# Patient Record
Sex: MALE | Race: WHITE | NOT HISPANIC OR LATINO | Employment: OTHER | URBAN - METROPOLITAN AREA
[De-identification: names, ages, dates, MRNs, and addresses within clinical notes are randomized per-mention and may not be internally consistent; named-entity substitution may affect disease eponyms.]

---

## 2020-06-19 ENCOUNTER — APPOINTMENT (OUTPATIENT)
Dept: PREADMISSION TESTING | Facility: HOSPITAL | Age: 65
End: 2020-06-19

## 2020-06-19 LAB
REF LAB TEST METHOD: NORMAL
SARS-COV-2 RNA RESP QL NAA+PROBE: NOT DETECTED

## 2020-06-19 PROCEDURE — U0002 COVID-19 LAB TEST NON-CDC: HCPCS

## 2020-06-19 PROCEDURE — C9803 HOPD COVID-19 SPEC COLLECT: HCPCS

## 2020-06-19 PROCEDURE — U0004 COV-19 TEST NON-CDC HGH THRU: HCPCS

## 2020-09-18 ENCOUNTER — APPOINTMENT (OUTPATIENT)
Dept: PREADMISSION TESTING | Facility: HOSPITAL | Age: 65
End: 2020-09-18

## 2020-09-20 ENCOUNTER — APPOINTMENT (OUTPATIENT)
Dept: PREADMISSION TESTING | Facility: HOSPITAL | Age: 65
End: 2020-09-20

## 2020-09-20 LAB — SARS-COV-2 RNA NOSE QL NAA+PROBE: NOT DETECTED

## 2020-09-20 PROCEDURE — U0004 COV-19 TEST NON-CDC HGH THRU: HCPCS

## 2020-09-20 PROCEDURE — C9803 HOPD COVID-19 SPEC COLLECT: HCPCS

## 2020-09-29 ENCOUNTER — HOSPITAL ENCOUNTER (INPATIENT)
Facility: HOSPITAL | Age: 65
LOS: 3 days | Discharge: HOME | End: 2020-10-02
Attending: EMERGENCY MEDICINE | Admitting: INTERNAL MEDICINE

## 2020-09-29 ENCOUNTER — APPOINTMENT (OUTPATIENT)
Dept: CT IMAGING | Facility: HOSPITAL | Age: 65
End: 2020-09-29

## 2020-09-29 ENCOUNTER — APPOINTMENT (OUTPATIENT)
Dept: MRI IMAGING | Facility: HOSPITAL | Age: 65
End: 2020-09-29

## 2020-09-29 ENCOUNTER — APPOINTMENT (OUTPATIENT)
Dept: GENERAL RADIOLOGY | Facility: HOSPITAL | Age: 65
End: 2020-09-29

## 2020-09-29 DIAGNOSIS — Z74.09 IMPAIRED FUNCTIONAL MOBILITY, BALANCE, GAIT, AND ENDURANCE: ICD-10-CM

## 2020-09-29 DIAGNOSIS — I63.9 ACUTE CVA (CEREBROVASCULAR ACCIDENT) (HCC): Primary | ICD-10-CM

## 2020-09-29 PROBLEM — E78.5 HYPERLIPIDEMIA LDL GOAL <100: Status: ACTIVE | Noted: 2020-09-29

## 2020-09-29 PROBLEM — I10 ESSENTIAL HYPERTENSION: Status: ACTIVE | Noted: 2020-09-29

## 2020-09-29 PROBLEM — Z85.21 HISTORY OF LARYNGEAL CANCER: Status: ACTIVE | Noted: 2020-09-29

## 2020-09-29 LAB
ALT SERPL W P-5'-P-CCNC: 21 U/L (ref 1–41)
APTT PPP: 26.8 SECONDS (ref 24–37)
AST SERPL-CCNC: 33 U/L (ref 1–40)
BASE EXCESS BLDA CALC-SCNC: -1 MMOL/L (ref -5–5)
BASOPHILS # BLD AUTO: 0.09 10*3/MM3 (ref 0–0.2)
BASOPHILS NFR BLD AUTO: 1.1 % (ref 0–1.5)
CA-I BLDA-SCNC: 1.22 MMOL/L (ref 1.2–1.32)
CO2 BLDA-SCNC: 26 MMOL/L (ref 24–29)
CREAT BLDA-MCNC: 1.3 MG/DL (ref 0.6–1.3)
DEPRECATED RDW RBC AUTO: 44.9 FL (ref 37–54)
EOSINOPHIL # BLD AUTO: 0.25 10*3/MM3 (ref 0–0.4)
EOSINOPHIL NFR BLD AUTO: 3 % (ref 0.3–6.2)
ERYTHROCYTE [DISTWIDTH] IN BLOOD BY AUTOMATED COUNT: 13.6 % (ref 12.3–15.4)
GLUCOSE BLDC GLUCOMTR-MCNC: 102 MG/DL (ref 70–130)
GLUCOSE BLDC GLUCOMTR-MCNC: 122 MG/DL (ref 70–130)
GLUCOSE BLDC GLUCOMTR-MCNC: 124 MG/DL (ref 70–130)
HCO3 BLDA-SCNC: 24.9 MMOL/L (ref 22–26)
HCT VFR BLD AUTO: 42.2 % (ref 37.5–51)
HCT VFR BLDA CALC: 43 % (ref 38–51)
HGB BLD-MCNC: 13.4 G/DL (ref 13–17.7)
HGB BLDA-MCNC: 14.6 G/DL (ref 12–17)
HOLD SPECIMEN: NORMAL
HOLD SPECIMEN: NORMAL
IMM GRANULOCYTES # BLD AUTO: 0.06 10*3/MM3 (ref 0–0.05)
IMM GRANULOCYTES NFR BLD AUTO: 0.7 % (ref 0–0.5)
INR PPP: 1.1 (ref 0.8–1.2)
LYMPHOCYTES # BLD AUTO: 0.84 10*3/MM3 (ref 0.7–3.1)
LYMPHOCYTES NFR BLD AUTO: 10.1 % (ref 19.6–45.3)
MCH RBC QN AUTO: 29 PG (ref 26.6–33)
MCHC RBC AUTO-ENTMCNC: 31.8 G/DL (ref 31.5–35.7)
MCV RBC AUTO: 91.3 FL (ref 79–97)
MONOCYTES # BLD AUTO: 0.55 10*3/MM3 (ref 0.1–0.9)
MONOCYTES NFR BLD AUTO: 6.6 % (ref 5–12)
NEUTROPHILS NFR BLD AUTO: 6.51 10*3/MM3 (ref 1.7–7)
NEUTROPHILS NFR BLD AUTO: 78.5 % (ref 42.7–76)
NRBC BLD AUTO-RTO: 0 /100 WBC (ref 0–0.2)
PCO2 BLDA: 47.8 MM HG (ref 35–45)
PH BLDA: 7.32 PH UNITS (ref 7.35–7.6)
PLATELET # BLD AUTO: 173 10*3/MM3 (ref 140–450)
PMV BLD AUTO: 10.5 FL (ref 6–12)
PO2 BLDA: 21 MMHG (ref 80–105)
POTASSIUM BLDA-SCNC: 4.5 MMOL/L (ref 3.5–4.9)
PROTHROMBIN TIME: 12.8 SECONDS (ref 12.8–15.2)
RBC # BLD AUTO: 4.62 10*6/MM3 (ref 4.14–5.8)
SAO2 % BLDA: 30 % (ref 95–98)
SARS-COV-2 RDRP RESP QL NAA+PROBE: NOT DETECTED
SODIUM BLD-SCNC: 141 MMOL/L (ref 138–146)
TROPONIN T SERPL-MCNC: <0.01 NG/ML (ref 0–0.03)
WBC # BLD AUTO: 8.3 10*3/MM3 (ref 3.4–10.8)
WHOLE BLOOD HOLD SPECIMEN: NORMAL
WHOLE BLOOD HOLD SPECIMEN: NORMAL

## 2020-09-29 PROCEDURE — C1769 GUIDE WIRE: HCPCS | Performed by: NEUROLOGICAL SURGERY

## 2020-09-29 PROCEDURE — 70498 CT ANGIOGRAPHY NECK: CPT

## 2020-09-29 PROCEDURE — 87635 SARS-COV-2 COVID-19 AMP PRB: CPT | Performed by: EMERGENCY MEDICINE

## 2020-09-29 PROCEDURE — 93010 ELECTROCARDIOGRAM REPORT: CPT | Performed by: INTERNAL MEDICINE

## 2020-09-29 PROCEDURE — 25010000002 MIDAZOLAM PER 1 MG: Performed by: NEUROLOGICAL SURGERY

## 2020-09-29 PROCEDURE — 82565 ASSAY OF CREATININE: CPT

## 2020-09-29 PROCEDURE — 25010000002 FENTANYL CITRATE (PF) 100 MCG/2ML SOLUTION: Performed by: NEUROLOGICAL SURGERY

## 2020-09-29 PROCEDURE — 0 IOPAMIDOL PER 1 ML

## 2020-09-29 PROCEDURE — 71045 X-RAY EXAM CHEST 1 VIEW: CPT

## 2020-09-29 PROCEDURE — 36224 PLACE CATH CAROTD ART: CPT | Performed by: NEUROLOGICAL SURGERY

## 2020-09-29 PROCEDURE — 84295 ASSAY OF SERUM SODIUM: CPT

## 2020-09-29 PROCEDURE — 70450 CT HEAD/BRAIN W/O DYE: CPT

## 2020-09-29 PROCEDURE — B41F1ZZ FLUOROSCOPY OF RIGHT LOWER EXTREMITY ARTERIES USING LOW OSMOLAR CONTRAST: ICD-10-PCS | Performed by: INTERNAL MEDICINE

## 2020-09-29 PROCEDURE — B3151ZZ FLUOROSCOPY OF BILATERAL COMMON CAROTID ARTERIES USING LOW OSMOLAR CONTRAST: ICD-10-PCS | Performed by: INTERNAL MEDICINE

## 2020-09-29 PROCEDURE — 99223 1ST HOSP IP/OBS HIGH 75: CPT | Performed by: STUDENT IN AN ORGANIZED HEALTH CARE EDUCATION/TRAINING PROGRAM

## 2020-09-29 PROCEDURE — 93005 ELECTROCARDIOGRAM TRACING: CPT | Performed by: NEUROLOGICAL SURGERY

## 2020-09-29 PROCEDURE — 84132 ASSAY OF SERUM POTASSIUM: CPT

## 2020-09-29 PROCEDURE — 82947 ASSAY GLUCOSE BLOOD QUANT: CPT

## 2020-09-29 PROCEDURE — B31G1ZZ FLUOROSCOPY OF BILATERAL VERTEBRAL ARTERIES USING LOW OSMOLAR CONTRAST: ICD-10-PCS | Performed by: INTERNAL MEDICINE

## 2020-09-29 PROCEDURE — 93005 ELECTROCARDIOGRAM TRACING: CPT | Performed by: EMERGENCY MEDICINE

## 2020-09-29 PROCEDURE — C1760 CLOSURE DEV, VASC: HCPCS | Performed by: NEUROLOGICAL SURGERY

## 2020-09-29 PROCEDURE — 99284 EMERGENCY DEPT VISIT MOD MDM: CPT

## 2020-09-29 PROCEDURE — 36226 PLACE CATH VERTEBRAL ART: CPT | Performed by: NEUROLOGICAL SURGERY

## 2020-09-29 PROCEDURE — C1894 INTRO/SHEATH, NON-LASER: HCPCS | Performed by: NEUROLOGICAL SURGERY

## 2020-09-29 PROCEDURE — 85610 PROTHROMBIN TIME: CPT

## 2020-09-29 PROCEDURE — 84460 ALANINE AMINO (ALT) (SGPT): CPT | Performed by: EMERGENCY MEDICINE

## 2020-09-29 PROCEDURE — 99223 1ST HOSP IP/OBS HIGH 75: CPT | Performed by: INTERNAL MEDICINE

## 2020-09-29 PROCEDURE — 84484 ASSAY OF TROPONIN QUANT: CPT | Performed by: EMERGENCY MEDICINE

## 2020-09-29 PROCEDURE — 85730 THROMBOPLASTIN TIME PARTIAL: CPT | Performed by: EMERGENCY MEDICINE

## 2020-09-29 PROCEDURE — 70496 CT ANGIOGRAPHY HEAD: CPT

## 2020-09-29 PROCEDURE — 0 IODIXANOL PER 1 ML: Performed by: NEUROLOGICAL SURGERY

## 2020-09-29 PROCEDURE — 85014 HEMATOCRIT: CPT

## 2020-09-29 PROCEDURE — 84450 TRANSFERASE (AST) (SGOT): CPT | Performed by: EMERGENCY MEDICINE

## 2020-09-29 PROCEDURE — 0042T HC CT CEREBRAL PERFUSION W/WO CONTRAST: CPT

## 2020-09-29 PROCEDURE — 82330 ASSAY OF CALCIUM: CPT

## 2020-09-29 PROCEDURE — 85025 COMPLETE CBC W/AUTO DIFF WBC: CPT | Performed by: EMERGENCY MEDICINE

## 2020-09-29 PROCEDURE — 70551 MRI BRAIN STEM W/O DYE: CPT

## 2020-09-29 PROCEDURE — 36223 PLACE CATH CAROTID/INOM ART: CPT | Performed by: NEUROLOGICAL SURGERY

## 2020-09-29 PROCEDURE — 82803 BLOOD GASES ANY COMBINATION: CPT

## 2020-09-29 PROCEDURE — B3121ZZ FLUOROSCOPY OF LEFT SUBCLAVIAN ARTERY USING LOW OSMOLAR CONTRAST: ICD-10-PCS | Performed by: INTERNAL MEDICINE

## 2020-09-29 RX ORDER — SODIUM CHLORIDE 0.9 % (FLUSH) 0.9 %
3 SYRINGE (ML) INJECTION EVERY 12 HOURS SCHEDULED
Status: DISCONTINUED | OUTPATIENT
Start: 2020-09-29 | End: 2020-09-30

## 2020-09-29 RX ORDER — IODIXANOL 320 MG/ML
INJECTION, SOLUTION INTRAVASCULAR AS NEEDED
Status: DISCONTINUED | OUTPATIENT
Start: 2020-09-29 | End: 2020-09-29 | Stop reason: HOSPADM

## 2020-09-29 RX ORDER — SODIUM CHLORIDE 0.9 % (FLUSH) 0.9 %
10 SYRINGE (ML) INJECTION AS NEEDED
Status: DISCONTINUED | OUTPATIENT
Start: 2020-09-29 | End: 2020-10-02 | Stop reason: HOSPADM

## 2020-09-29 RX ORDER — OXYCODONE HYDROCHLORIDE AND ACETAMINOPHEN 5; 325 MG/1; MG/1
2 TABLET ORAL EVERY 4 HOURS PRN
Status: DISCONTINUED | OUTPATIENT
Start: 2020-09-29 | End: 2020-10-02 | Stop reason: HOSPADM

## 2020-09-29 RX ORDER — SODIUM CHLORIDE 0.9 % (FLUSH) 0.9 %
10 SYRINGE (ML) INJECTION AS NEEDED
Status: DISCONTINUED | OUTPATIENT
Start: 2020-09-29 | End: 2020-09-30

## 2020-09-29 RX ORDER — ASPIRIN 325 MG
325 TABLET ORAL DAILY
Status: DISCONTINUED | OUTPATIENT
Start: 2020-09-29 | End: 2020-10-01

## 2020-09-29 RX ORDER — SODIUM CHLORIDE 9 MG/ML
75 INJECTION, SOLUTION INTRAVENOUS CONTINUOUS
Status: ACTIVE | OUTPATIENT
Start: 2020-09-29 | End: 2020-09-29

## 2020-09-29 RX ORDER — LIDOCAINE HYDROCHLORIDE 10 MG/ML
INJECTION, SOLUTION EPIDURAL; INFILTRATION; INTRACAUDAL; PERINEURAL AS NEEDED
Status: DISCONTINUED | OUTPATIENT
Start: 2020-09-29 | End: 2020-09-29 | Stop reason: HOSPADM

## 2020-09-29 RX ORDER — DORZOLAMIDE HYDROCHLORIDE AND TIMOLOL MALEATE 20; 5 MG/ML; MG/ML
1 SOLUTION/ DROPS OPHTHALMIC 2 TIMES DAILY
COMMUNITY
End: 2022-05-04

## 2020-09-29 RX ORDER — SODIUM CHLORIDE 0.9 % (FLUSH) 0.9 %
10 SYRINGE (ML) INJECTION EVERY 12 HOURS SCHEDULED
Status: DISCONTINUED | OUTPATIENT
Start: 2020-09-29 | End: 2020-10-02 | Stop reason: HOSPADM

## 2020-09-29 RX ORDER — SODIUM CHLORIDE 9 MG/ML
100 INJECTION, SOLUTION INTRAVENOUS ONCE
Status: DISCONTINUED | OUTPATIENT
Start: 2020-09-29 | End: 2020-09-30

## 2020-09-29 RX ORDER — DORZOLAMIDE HYDROCHLORIDE AND TIMOLOL MALEATE 20; 5 MG/ML; MG/ML
SOLUTION/ DROPS OPHTHALMIC 2 TIMES DAILY
Status: DISCONTINUED | OUTPATIENT
Start: 2020-09-29 | End: 2020-10-02 | Stop reason: HOSPADM

## 2020-09-29 RX ORDER — BRIMONIDINE TARTRATE AND TIMOLOL MALEATE 2; 5 MG/ML; MG/ML
1 SOLUTION OPHTHALMIC EVERY 12 HOURS
COMMUNITY
End: 2022-05-04

## 2020-09-29 RX ORDER — SODIUM CHLORIDE 0.9 % (FLUSH) 0.9 %
10 SYRINGE (ML) INJECTION EVERY 12 HOURS SCHEDULED
Status: DISCONTINUED | OUTPATIENT
Start: 2020-09-29 | End: 2020-09-30

## 2020-09-29 RX ORDER — BRIMONIDINE TARTRATE 2 MG/ML
1 SOLUTION/ DROPS OPHTHALMIC EVERY 12 HOURS SCHEDULED
Status: DISCONTINUED | OUTPATIENT
Start: 2020-09-29 | End: 2020-10-02 | Stop reason: HOSPADM

## 2020-09-29 RX ORDER — ASPIRIN 300 MG/1
300 SUPPOSITORY RECTAL DAILY
Status: DISCONTINUED | OUTPATIENT
Start: 2020-09-29 | End: 2020-10-01

## 2020-09-29 RX ORDER — MIDAZOLAM HYDROCHLORIDE 1 MG/ML
INJECTION INTRAMUSCULAR; INTRAVENOUS AS NEEDED
Status: DISCONTINUED | OUTPATIENT
Start: 2020-09-29 | End: 2020-09-29 | Stop reason: HOSPADM

## 2020-09-29 RX ORDER — LATANOPROST 50 UG/ML
1 SOLUTION/ DROPS OPHTHALMIC NIGHTLY
Status: DISCONTINUED | OUTPATIENT
Start: 2020-09-29 | End: 2020-10-02 | Stop reason: HOSPADM

## 2020-09-29 RX ORDER — FENTANYL CITRATE 50 UG/ML
INJECTION, SOLUTION INTRAMUSCULAR; INTRAVENOUS AS NEEDED
Status: DISCONTINUED | OUTPATIENT
Start: 2020-09-29 | End: 2020-09-29 | Stop reason: HOSPADM

## 2020-09-29 RX ORDER — ATORVASTATIN CALCIUM 40 MG/1
80 TABLET, FILM COATED ORAL NIGHTLY
Status: DISCONTINUED | OUTPATIENT
Start: 2020-09-29 | End: 2020-09-30

## 2020-09-29 RX ADMIN — SODIUM CHLORIDE 75 ML/HR: 9 INJECTION, SOLUTION INTRAVENOUS at 16:46

## 2020-09-29 RX ADMIN — DORZOLAMIDE HYDROCHLORIDE: 20 SOLUTION/ DROPS OPHTHALMIC at 21:55

## 2020-09-29 RX ADMIN — LATANOPROST 1 DROP: 50 SOLUTION OPHTHALMIC at 21:53

## 2020-09-29 RX ADMIN — SODIUM CHLORIDE, PRESERVATIVE FREE 10 ML: 5 INJECTION INTRAVENOUS at 21:15

## 2020-09-29 RX ADMIN — Medication 10 ML: at 21:15

## 2020-09-29 RX ADMIN — Medication 3 ML: at 21:15

## 2020-09-29 RX ADMIN — BRIMONIDINE TARTRATE 1 DROP: 2 SOLUTION OPHTHALMIC at 21:53

## 2020-09-29 RX ADMIN — IOPAMIDOL 115 ML: 755 INJECTION, SOLUTION INTRAVENOUS at 13:18

## 2020-09-30 ENCOUNTER — APPOINTMENT (OUTPATIENT)
Dept: CARDIOLOGY | Facility: HOSPITAL | Age: 65
End: 2020-09-30

## 2020-09-30 LAB
ALBUMIN SERPL-MCNC: 4 G/DL (ref 3.5–5.2)
ALBUMIN/GLOB SERPL: 1.4 G/DL
ALP SERPL-CCNC: 98 U/L (ref 39–117)
ALT SERPL W P-5'-P-CCNC: 19 U/L (ref 1–41)
ANION GAP SERPL CALCULATED.3IONS-SCNC: 12 MMOL/L (ref 5–15)
AST SERPL-CCNC: 28 U/L (ref 1–40)
BASOPHILS # BLD AUTO: 0.07 10*3/MM3 (ref 0–0.2)
BASOPHILS NFR BLD AUTO: 0.5 % (ref 0–1.5)
BH CV ECHO MEAS - AO MAX PG (FULL): 3.3 MMHG
BH CV ECHO MEAS - AO MAX PG: 7.3 MMHG
BH CV ECHO MEAS - AO MEAN PG (FULL): 2.7 MMHG
BH CV ECHO MEAS - AO MEAN PG: 4.9 MMHG
BH CV ECHO MEAS - AO ROOT AREA (BSA CORRECTED): 1.4
BH CV ECHO MEAS - AO ROOT AREA: 6.7 CM^2
BH CV ECHO MEAS - AO ROOT DIAM: 2.9 CM
BH CV ECHO MEAS - AO V2 MAX: 135.1 CM/SEC
BH CV ECHO MEAS - AO V2 MEAN: 105.7 CM/SEC
BH CV ECHO MEAS - AO V2 VTI: 22.7 CM
BH CV ECHO MEAS - BSA(HAYCOCK): 2.1 M^2
BH CV ECHO MEAS - BSA: 2.1 M^2
BH CV ECHO MEAS - BZI_BMI: 29 KILOGRAMS/M^2
BH CV ECHO MEAS - BZI_METRIC_HEIGHT: 177.8 CM
BH CV ECHO MEAS - BZI_METRIC_WEIGHT: 91.6 KG
BH CV ECHO MEAS - EDV(CUBED): 71.5 ML
BH CV ECHO MEAS - EDV(MOD-SP2): 41 ML
BH CV ECHO MEAS - EDV(MOD-SP4): 36 ML
BH CV ECHO MEAS - EDV(TEICH): 76.4 ML
BH CV ECHO MEAS - EF(CUBED): 61.7 %
BH CV ECHO MEAS - EF(MOD-BP): 72 %
BH CV ECHO MEAS - EF(MOD-SP2): 73.2 %
BH CV ECHO MEAS - EF(MOD-SP4): 72.2 %
BH CV ECHO MEAS - EF(TEICH): 53.7 %
BH CV ECHO MEAS - ESV(CUBED): 27.4 ML
BH CV ECHO MEAS - ESV(MOD-SP2): 11 ML
BH CV ECHO MEAS - ESV(MOD-SP4): 10 ML
BH CV ECHO MEAS - ESV(TEICH): 35.4 ML
BH CV ECHO MEAS - FS: 27.4 %
BH CV ECHO MEAS - IVS/LVPW: 1.1
BH CV ECHO MEAS - IVSD: 1.1 CM
BH CV ECHO MEAS - LA DIMENSION: 3.7 CM
BH CV ECHO MEAS - LA/AO: 1.3
BH CV ECHO MEAS - LAD MAJOR: 5 CM
BH CV ECHO MEAS - LAT PEAK E' VEL: 10.7 CM/SEC
BH CV ECHO MEAS - LATERAL E/E' RATIO: 6.3
BH CV ECHO MEAS - LV DIASTOLIC VOL/BSA (35-75): 17.2 ML/M^2
BH CV ECHO MEAS - LV MASS(C)D: 152.1 GRAMS
BH CV ECHO MEAS - LV MASS(C)DI: 72.6 GRAMS/M^2
BH CV ECHO MEAS - LV MAX PG: 4 MMHG
BH CV ECHO MEAS - LV MEAN PG: 2.2 MMHG
BH CV ECHO MEAS - LV SYSTOLIC VOL/BSA (12-30): 4.8 ML/M^2
BH CV ECHO MEAS - LV V1 MAX: 100.2 CM/SEC
BH CV ECHO MEAS - LV V1 MEAN: 69.1 CM/SEC
BH CV ECHO MEAS - LV V1 VTI: 19.7 CM
BH CV ECHO MEAS - LVIDD: 4.2 CM
BH CV ECHO MEAS - LVIDS: 3 CM
BH CV ECHO MEAS - LVLD AP2: 6.8 CM
BH CV ECHO MEAS - LVLD AP4: 7.1 CM
BH CV ECHO MEAS - LVLS AP2: 5.8 CM
BH CV ECHO MEAS - LVLS AP4: 5.5 CM
BH CV ECHO MEAS - LVPWD: 1 CM
BH CV ECHO MEAS - MED PEAK E' VEL: 7.7 CM/SEC
BH CV ECHO MEAS - MEDIAL E/E' RATIO: 8.8
BH CV ECHO MEAS - MV A MAX VEL: 93.3 CM/SEC
BH CV ECHO MEAS - MV DEC SLOPE: 279.2 CM/SEC^2
BH CV ECHO MEAS - MV DEC TIME: 0.23 SEC
BH CV ECHO MEAS - MV E MAX VEL: 69.1 CM/SEC
BH CV ECHO MEAS - MV E/A: 0.74
BH CV ECHO MEAS - MV P1/2T MAX VEL: 83.3 CM/SEC
BH CV ECHO MEAS - MV P1/2T: 87.3 MSEC
BH CV ECHO MEAS - MVA P1/2T LCG: 2.6 CM^2
BH CV ECHO MEAS - MVA(P1/2T): 2.5 CM^2
BH CV ECHO MEAS - SI(AO): 72 ML/M^2
BH CV ECHO MEAS - SI(CUBED): 21 ML/M^2
BH CV ECHO MEAS - SI(MOD-SP2): 14.3 ML/M^2
BH CV ECHO MEAS - SI(MOD-SP4): 12.4 ML/M^2
BH CV ECHO MEAS - SI(TEICH): 19.6 ML/M^2
BH CV ECHO MEAS - SV(AO): 150.9 ML
BH CV ECHO MEAS - SV(CUBED): 44.1 ML
BH CV ECHO MEAS - SV(MOD-SP2): 30 ML
BH CV ECHO MEAS - SV(MOD-SP4): 26 ML
BH CV ECHO MEAS - SV(TEICH): 41 ML
BH CV ECHO MEAS - TAPSE (>1.6): 1.7 CM
BH CV ECHO MEASUREMENTS AVERAGE E/E' RATIO: 7.51
BH CV VAS BP RIGHT ARM: NORMAL MMHG
BH CV XLRA - TDI S': 17.4 CM/SEC
BILIRUB SERPL-MCNC: 0.9 MG/DL (ref 0–1.2)
BUN SERPL-MCNC: 15 MG/DL (ref 8–23)
BUN/CREAT SERPL: 13.3 (ref 7–25)
CALCIUM SPEC-SCNC: 9 MG/DL (ref 8.6–10.5)
CHLORIDE SERPL-SCNC: 102 MMOL/L (ref 98–107)
CHOLEST SERPL-MCNC: 121 MG/DL (ref 0–200)
CO2 SERPL-SCNC: 22 MMOL/L (ref 22–29)
CREAT SERPL-MCNC: 1.13 MG/DL (ref 0.76–1.27)
DEPRECATED RDW RBC AUTO: 47.3 FL (ref 37–54)
EOSINOPHIL # BLD AUTO: 0.07 10*3/MM3 (ref 0–0.4)
EOSINOPHIL NFR BLD AUTO: 0.5 % (ref 0.3–6.2)
ERYTHROCYTE [DISTWIDTH] IN BLOOD BY AUTOMATED COUNT: 13.9 % (ref 12.3–15.4)
GFR SERPL CREATININE-BSD FRML MDRD: 65 ML/MIN/1.73
GFR SERPL CREATININE-BSD FRML MDRD: 79 ML/MIN/1.73
GLOBULIN UR ELPH-MCNC: 2.9 GM/DL
GLUCOSE SERPL-MCNC: 101 MG/DL (ref 65–99)
HBA1C MFR BLD: 5.6 % (ref 4.8–5.6)
HCT VFR BLD AUTO: 43.6 % (ref 37.5–51)
HDLC SERPL-MCNC: 37 MG/DL (ref 40–60)
HGB BLD-MCNC: 13.6 G/DL (ref 13–17.7)
IMM GRANULOCYTES # BLD AUTO: 0.23 10*3/MM3 (ref 0–0.05)
IMM GRANULOCYTES NFR BLD AUTO: 1.7 % (ref 0–0.5)
LDLC SERPL CALC-MCNC: 65 MG/DL (ref 0–100)
LDLC/HDLC SERPL: 1.75 {RATIO}
LEFT ATRIUM VOLUME INDEX: 15.7 ML/M^2
LEFT ATRIUM VOLUME: 33 ML
LV EF 2D ECHO EST: 70 %
LYMPHOCYTES # BLD AUTO: 0.72 10*3/MM3 (ref 0.7–3.1)
LYMPHOCYTES NFR BLD AUTO: 5.4 % (ref 19.6–45.3)
MAGNESIUM SERPL-MCNC: 2.5 MG/DL (ref 1.6–2.4)
MAXIMAL PREDICTED HEART RATE: 154 BPM
MCH RBC QN AUTO: 29 PG (ref 26.6–33)
MCHC RBC AUTO-ENTMCNC: 31.2 G/DL (ref 31.5–35.7)
MCV RBC AUTO: 93 FL (ref 79–97)
MONOCYTES # BLD AUTO: 1.05 10*3/MM3 (ref 0.1–0.9)
MONOCYTES NFR BLD AUTO: 7.9 % (ref 5–12)
NEUTROPHILS NFR BLD AUTO: 11.15 10*3/MM3 (ref 1.7–7)
NEUTROPHILS NFR BLD AUTO: 84 % (ref 42.7–76)
NRBC BLD AUTO-RTO: 0 /100 WBC (ref 0–0.2)
PLATELET # BLD AUTO: 169 10*3/MM3 (ref 140–450)
PMV BLD AUTO: 10.8 FL (ref 6–12)
POTASSIUM SERPL-SCNC: 4.4 MMOL/L (ref 3.5–5.2)
PROT SERPL-MCNC: 6.9 G/DL (ref 6–8.5)
RBC # BLD AUTO: 4.69 10*6/MM3 (ref 4.14–5.8)
SODIUM SERPL-SCNC: 136 MMOL/L (ref 136–145)
STRESS TARGET HR: 131 BPM
TRIGL SERPL-MCNC: 96 MG/DL (ref 0–150)
VLDLC SERPL-MCNC: 19.2 MG/DL
WBC # BLD AUTO: 13.29 10*3/MM3 (ref 3.4–10.8)

## 2020-09-30 PROCEDURE — 83036 HEMOGLOBIN GLYCOSYLATED A1C: CPT | Performed by: NEUROLOGICAL SURGERY

## 2020-09-30 PROCEDURE — 83735 ASSAY OF MAGNESIUM: CPT | Performed by: INTERNAL MEDICINE

## 2020-09-30 PROCEDURE — 92523 SPEECH SOUND LANG COMPREHEN: CPT

## 2020-09-30 PROCEDURE — 80053 COMPREHEN METABOLIC PANEL: CPT | Performed by: INTERNAL MEDICINE

## 2020-09-30 PROCEDURE — 97161 PT EVAL LOW COMPLEX 20 MIN: CPT

## 2020-09-30 PROCEDURE — 99232 SBSQ HOSP IP/OBS MODERATE 35: CPT | Performed by: INTERNAL MEDICINE

## 2020-09-30 PROCEDURE — 97165 OT EVAL LOW COMPLEX 30 MIN: CPT

## 2020-09-30 PROCEDURE — 92610 EVALUATE SWALLOWING FUNCTION: CPT

## 2020-09-30 PROCEDURE — 93306 TTE W/DOPPLER COMPLETE: CPT

## 2020-09-30 PROCEDURE — 85025 COMPLETE CBC W/AUTO DIFF WBC: CPT | Performed by: INTERNAL MEDICINE

## 2020-09-30 PROCEDURE — 80061 LIPID PANEL: CPT | Performed by: NEUROLOGICAL SURGERY

## 2020-09-30 RX ORDER — ATORVASTATIN CALCIUM 40 MG/1
40 TABLET, FILM COATED ORAL NIGHTLY
Status: DISCONTINUED | OUTPATIENT
Start: 2020-09-30 | End: 2020-10-02 | Stop reason: HOSPADM

## 2020-09-30 RX ADMIN — DORZOLAMIDE HYDROCHLORIDE: 20 SOLUTION/ DROPS OPHTHALMIC at 09:18

## 2020-09-30 RX ADMIN — ASPIRIN 300 MG: 300 SUPPOSITORY RECTAL at 01:04

## 2020-09-30 RX ADMIN — BRIMONIDINE TARTRATE 1 DROP: 2 SOLUTION OPHTHALMIC at 21:34

## 2020-09-30 RX ADMIN — ATORVASTATIN CALCIUM 40 MG: 40 TABLET, FILM COATED ORAL at 21:27

## 2020-09-30 RX ADMIN — SODIUM CHLORIDE, PRESERVATIVE FREE 10 ML: 5 INJECTION INTRAVENOUS at 21:34

## 2020-09-30 RX ADMIN — ASPIRIN 325 MG ORAL TABLET 325 MG: 325 PILL ORAL at 10:17

## 2020-09-30 RX ADMIN — SODIUM CHLORIDE, PRESERVATIVE FREE 10 ML: 5 INJECTION INTRAVENOUS at 09:25

## 2020-09-30 RX ADMIN — LATANOPROST 1 DROP: 50 SOLUTION OPHTHALMIC at 21:27

## 2020-09-30 RX ADMIN — BRIMONIDINE TARTRATE 1 DROP: 2 SOLUTION OPHTHALMIC at 09:18

## 2020-09-30 RX ADMIN — DORZOLAMIDE HYDROCHLORIDE: 20 SOLUTION/ DROPS OPHTHALMIC at 21:27

## 2020-10-01 PROCEDURE — 99232 SBSQ HOSP IP/OBS MODERATE 35: CPT | Performed by: INTERNAL MEDICINE

## 2020-10-01 PROCEDURE — 94799 UNLISTED PULMONARY SVC/PX: CPT

## 2020-10-01 PROCEDURE — 94760 N-INVAS EAR/PLS OXIMETRY 1: CPT

## 2020-10-01 PROCEDURE — 99233 SBSQ HOSP IP/OBS HIGH 50: CPT | Performed by: STUDENT IN AN ORGANIZED HEALTH CARE EDUCATION/TRAINING PROGRAM

## 2020-10-01 RX ORDER — ASPIRIN 81 MG/1
81 TABLET ORAL DAILY
Status: DISCONTINUED | OUTPATIENT
Start: 2020-10-01 | End: 2020-10-02 | Stop reason: HOSPADM

## 2020-10-01 RX ADMIN — SODIUM CHLORIDE, PRESERVATIVE FREE 10 ML: 5 INJECTION INTRAVENOUS at 09:49

## 2020-10-01 RX ADMIN — ATORVASTATIN CALCIUM 40 MG: 40 TABLET, FILM COATED ORAL at 20:49

## 2020-10-01 RX ADMIN — LATANOPROST 1 DROP: 50 SOLUTION OPHTHALMIC at 20:49

## 2020-10-01 RX ADMIN — DORZOLAMIDE HYDROCHLORIDE: 20 SOLUTION/ DROPS OPHTHALMIC at 09:48

## 2020-10-01 RX ADMIN — DORZOLAMIDE HYDROCHLORIDE: 20 SOLUTION/ DROPS OPHTHALMIC at 21:29

## 2020-10-01 RX ADMIN — BRIMONIDINE TARTRATE 1 DROP: 2 SOLUTION OPHTHALMIC at 21:36

## 2020-10-01 RX ADMIN — APIXABAN 5 MG: 5 TABLET, FILM COATED ORAL at 20:49

## 2020-10-01 RX ADMIN — BRIMONIDINE TARTRATE 1 DROP: 2 SOLUTION OPHTHALMIC at 09:48

## 2020-10-01 RX ADMIN — ASPIRIN 325 MG ORAL TABLET 325 MG: 325 PILL ORAL at 09:45

## 2020-10-01 RX ADMIN — SODIUM CHLORIDE, PRESERVATIVE FREE 10 ML: 5 INJECTION INTRAVENOUS at 20:49

## 2020-10-01 RX ADMIN — ASPIRIN 81 MG: 81 TABLET, COATED ORAL at 17:38

## 2020-10-01 NOTE — PROGRESS NOTES
Stroke Progress Note       Chief Complaint:  Speech difficulty    Subjective    Subjective     Subjective  Speech improved.     Review of Systems   Constitutional: No fatigue  HENT: Negative for nosebleeds and rhinorrhea.    Eyes: Negative for redness.   Respiratory: Negative for cough.    Gastrointestinal: Negative for anal bleeding.   Endocrine: Negative for polydipsia.   Genitourinary: Negative for enuresis and urgency.   Musculoskeletal: Negative for joint swelling.   Neurological: Negative for tremors.   Psychiatric/Behavioral: Negative for hallucinations.     Objective      Temp:  [98.1 °F (36.7 °C)-100.3 °F (37.9 °C)] 98.1 °F (36.7 °C)  Heart Rate:  [] 98  Resp:  [16-18] 16  BP: (113-175)/() 137/77    GEN: NAD, pleasant, cooperative  Eyes-show anicteric sclera, moist conjunctiva with no lid lag, no redness  Neck-trachea midline.  There is no thyromegaly.  ENMT-oropharynx clear with moist mucous membranes and good dentition.  Skin-no rash, lesions or ulcers.  Cardiovascular exam-no pedal edema, regular rate and rhythm.  CHEST: No signs of resp distress, on room air  Abdomen-no abdominal distention, nontender.  Psychiatric exam-alert oriented x3 with intact judgment and insight      NEURO    MENTAL STATUS: AAOx3, memory intact, fund of knowledge appropriate    LANG/SPEECH: Naming and repetition intact, fluent, follows 3-step commands    CRANIAL NERVES:      II: Pupils equal and reactive, no RAPD, no VF deficits, fundus(not done)      III, IV, VI: EOM intact, no gaze preference or deviation, no nystagmus.      V: normal sensation in V1, V2, and V3 segments bilaterally      VII: no asymmetry, no nasolabial fold flattening      VIII: normal hearing to speech      IX, X: normal palatal elevation, no uvular deviation      XI: 5/5 head turn and 5/5 shoulder shrug bilaterally      XII: midline tongue protrusion    MOTOR:  Normal tone throughout  5/5 muscle power in Rt shoulder abductors/adductors, elbow  flexors/extensors, wrist flexors/extensors, finger abductors/adductors.  5/5 in Rt hip flexors/extensors, knee flexors/extensors, ankle dorsiflexors and plantar flexors.    5/5 muscle power in Lt shoulder abductors/adductors, elbow flexors/extensors, wrist flexors/extensors, finger abductors/adductors.  5/5 in Lt hip flexors/extensors, knee flexors/extensors, ankle dorsiflexors and plantea flexors.    REFLEXES:  no Green's, no clonus    SENSORY:    COORDINATION: Normal finger to nose and heel to shin, no tremor, no dysmetria    STATION: Not assessed due to patient condition    GAIT: Not assessed due to patient condition    Results Review:    I reviewed the patient's new clinical results.    Lab Results (last 24 hours)     ** No results found for the last 24 hours. **        Mri Brain Without Contrast    Result Date: 9/30/2020  1. Restricted diffusion left frontotemporal region as described above including left basal ganglia and insular cortex regions of acute infarction with minimal localized edema, however, no midline shift or mass effect associated.  2. Asymmetric signal flow voids left distal internal carotid artery better seen on recent CTA performed earlier same day.  D:  09/29/2020 E:  09/29/2020     This report was finalized on 9/30/2020 3:53 PM by Dr. Rigo Jung.      Xr Chest 1 View    Result Date: 9/29/2020  Mild nonspecific pulmonary interstitial changes as described. No other evidence of active chest disease is seen.  D:  09/29/2020 E:  09/29/2020     This report was finalized on 9/29/2020 9:10 PM by Dr. Jeramie Kemp MD.      Results for orders placed during the hospital encounter of 09/29/20   Adult Transthoracic Echo Complete W/ Cont if Necessary Per Protocol (With Agitated Saline)    Narrative · Trace mitral valve regurgitation is present.  · Estimated left ventricular EF = 70% Left ventricular systolic function   is normal.                Assessment/Plan     Assessment/Plan:        This is a  65-year-old, , right-handed male with known medical diagnosis of laryngeal cancer status post radiation, hypertension, and hyperlipidemia for an acute onset of confusion and speech problems.  Not a candidate for TPA as he was out of window.  Because of the fluctuating nature of symptoms, patient was taken to the Cath Lab emergently to evaluate the carotid stenosis and MCA occlusion for possible stent and a thrombectomy.  However, the occlusion seems to be chronic, neither stent or thrombectomy was performed.     Diagnostics- I personally reviewed all the imaging and labs  -CT head-early ischemic changes of the left MCA, aspects of 7  -CT head and neck, left MCA superior division occlusion, left ICA occlusion at the origin with reconstitution of the skull base likely collateralization from external carotid artery, left common carotid significant stenosis.  -MRI brain-  left frontotemporal infarct involving basal ganglia and insular cortex.  No microhemorrhages GRE sequences.   -Transthoracic echo-72% EF, normal left atrium, no PFO  -LDL 65 , A1c 5.6     #1  a. Acute ischemic stroke, left middle cerebral artery, likely etiology atheroembolic from large vessel disease.  Patient has significant stenosis of the left common carotid, occlusion of the left ICA with reconstitution at the base of the skull, with superior MCA division occlusion.  Most likely the carotid occlusion seems to be chronic, and this could be stump emboli occluding left superior division MCA.  b. Stroke secondary prevention, eliquis  5mg BID + Aspirin 81, followed by repeat CTA in 3 months, may transition to DAPT later.  c. Stroke recovery- Activity, PT/OT/Speech-increase activity.  Can transition to floor on 9/30.   d. Stroke education-educated on adherence of medications.  And stroke modifiable risk factors.                  #2 Hypertension-normalize blood pressure goals  #3 Hypothyroidism-continue home medication   #4 Hyperlipidemia- goal  less than < 70. Start high intensity statin Lipitor  80              Ganga Palmer MD  10/01/20  13:53 EDT

## 2020-10-01 NOTE — DISCHARGE SUMMARY
[facility] Medicine Services  DISCHARGE SUMMARY    Patient Name: Patrick Shay  : 1955  MRN: [id number]    Date of Admission: 2020  1:05 PM  Date of Discharge:  10/2/2020  Primary Care Physician: Provider, No Known    Consults     Date and Time Order Name Status Description    2020 1713 Inpatient Neurology Consult Stroke Completed     2020 1309 Inpatient Neurology Consult Stroke Completed           Hospital Course     Presenting Problem:   Acute CVA (cerebrovascular accident) (CMS/HCC) [I63.9]    Active Hospital Problems    Diagnosis  POA   • **Acute CVA (cerebrovascular accident) (CMS/HCC) [I63.9]  Yes   • Essential hypertension [I10]  Yes   • Hyperlipidemia LDL goal <100 [E78.5]  Yes   • History of laryngeal cancer [Z85.21]  Not Applicable      Resolved Hospital Problems   No resolved problems to display.          Hospital Course:  Patrick Shay is a 65 y.o. male with history of HTN, HLD, laryngeal cancer s/p largynectomy and previous trach who initially presented to [facility] ICU  with stroke like symptoms - including facial droop and slurred speech. Patient was noted on CT imaging to have concern for early left MCA stroke with edema, he was taken emergently to the cath lab for intervention where he was noted to have stenosis favored to be chronic and secondary to previous radiation, no intervention was done and no occlusion noted. Patient was not given TPA due to being outside of window. He was transitioned out of ICU and to floor  and was doing well, stable for discharge 10/1.  There was some notes about patient previously being on xarelto (reason unknown) but patient and wife denied this and reported no h/o of being on blood thinners. I d/w neurology who ultimately did recommend starting eliquis for secondary prevention and patient was started on eliquis as well as ASA 81mg which he tolerated well. Will continue these medications with high intensity statin  upon discharge and will have close PCP-neurology f/u    Patient's remainder of stroke w/u negative - EF normal on TTE.       Discharge Follow Up Recommendations for outpatient labs/diagnostics:  PCP- Dr Niharika Hsieh in 1-2 weeks  [facility] in 4-6 weeks    Day of Discharge     HPI:   Doing well. Tolerated eliquis without issue. Wants to go home    Review of Systems  Gen- No fevers, chills  CV- No chest pain, palpitations  Resp- No cough, dyspnea  GI- No N/V/D, abd pain        Vital Signs:   Temp:  [96.6 °F (35.9 °C)-99.3 °F (37.4 °C)] 98 °F (36.7 °C)  Heart Rate:  [63-98] 85  Resp:  [14-18] 18  BP: (102-150)/(65-89) 139/82     Physical Exam:  GEN- no acute distress noted, resting in bed, awake  HEENT- atraumatic, trach in place with pessary valve   NECK- supple, trachea midline, no masses  RESP: ctab, normal effort  CV: no murmurs, s1/s2, rrr  MSK: no edema noted, spontaneous movement of all extremities  NEURO: alert, oriented, no focal deficits  SKIN: no rashes  PSYCH: appropriate mood and affect     Pertinent  and/or Most Recent Results     Results from last 7 days   Lab Units 10/02/20  0759 09/30/20  0506 09/29/20  1327 09/29/20  1320 09/29/20  1319   WBC 10*3/mm3 9.66 13.29* 8.30  --   --    HEMOGLOBIN g/dL 14.0 13.6 13.4  --   --    HEMOGLOBIN, POC g/dL  --   --   --   --  14.6   HEMATOCRIT % 44.0 43.6 42.2  --   --    HEMATOCRIT POC %  --   --   --   --  43   PLATELETS 10*3/mm3 161 169 173  --   --    SODIUM mmol/L 135* 136  --   --   --    POTASSIUM mmol/L 3.5 4.4  --   --   --    CHLORIDE mmol/L 99 102  --   --   --    CO2 mmol/L 27.0 22.0  --   --   --    BUN mg/dL 13 15  --   --   --    CREATININE mg/dL 1.07 1.13  --  1.30  --    GLUCOSE mg/dL 116* 101*  --   --   --    CALCIUM mg/dL 9.2 9.0  --   --   --      Results from last 7 days   Lab Units 09/30/20  0506 09/29/20  1327 09/29/20  1322   BILIRUBIN mg/dL 0.9  --   --    ALK PHOS U/L 98  --   --    ALT (SGPT) U/L 19 21  --    AST (SGOT) U/L 28 33  --     PROTIME seconds  --   --  12.8   INR   --   --  1.1   APTT seconds  --  26.8  --      Results from last 7 days   Lab Units 09/30/20  0506   CHOLESTEROL mg/dL 121   TRIGLYCERIDES mg/dL 96   HDL CHOL mg/dL 37*     Results from last 7 days   Lab Units 09/30/20  0506 09/29/20  1327   HEMOGLOBIN A1C % 5.60  --    TROPONIN T ng/mL  --  <0.010       Brief Urine Lab Results     None          Microbiology Results Abnormal     Procedure Component Value - Date/Time    COVID-19, [facility] IN-HOUSE,NP Swab (NO TRANSPORT MEDIA) 2 HR TAT - Swab, Nasopharynx [[id number]]  (Normal) Collected: 09/29/20 1346    Lab Status: Final result Specimen: Swab from Nasopharynx Updated: 09/29/20 1448     COVID19 Not Detected    Narrative:      Fact sheet for providers: https://www.fda.gov/media/085687/download     Fact sheet for patients: https://www.fda.gov/media/984056/download          Imaging Results (All)     Procedure Component Value Units Date/Time    MRI Brain Without Contrast [[id number]] Collected: 09/29/20 1856     Updated: 09/30/20 1557    Narrative:      EXAMINATION: MRI BRAIN WO CONTRAST-09/29/2020:     INDICATION: Stroke, follow-up; I63.9-Cerebral infarction, unspecified.      TECHNIQUE: Multiplanar MRI of the brain without intravenous contrast.     COMPARISON: CT head and CT cerebral perfusion earlier same day.     FINDINGS: Restricted diffusion left anterior basal ganglia, left insular  cortex and left frontal lobe with localized edema of increased T2 and  FLAIR signal without associated mass effect or midline shift. Background  mild chronic small vessel ischemic disease noted in the supratentorial  white matter. Globes and orbits retain normal T2 signal characteristics.  The visualized paranasal sinuses and mastoid air cells are grossly clear  and well pneumatized with the exception of trace right and small-to-  moderate left mastoid effusions. No cerebellopontine angle mass lesion.  Asymmetric signal flow voids in the left distal  internal carotid artery  on limited evaluation. Susceptibility artifact not present on  susceptibility-weighted imaging. Pituitary and sella within normal  limits. Cervicomedullary junction widely patent.       Impression:      1. Restricted diffusion left frontotemporal region as described above  including left basal ganglia and insular cortex regions of acute  infarction with minimal localized edema, however, no midline shift or  mass effect associated.     2. Asymmetric signal flow voids left distal internal carotid artery  better seen on recent CTA performed earlier same day.     D:  09/29/2020  E:  09/29/2020            This report was finalized on 9/30/2020 3:53 PM by Dr. Rigo Jung.       CT Cerebral Perfusion With & Without Contrast [[id number]] Collected: 09/29/20 1332     Updated: 09/29/20 2234    Narrative:      EXAMINATION: CT CEREBRAL PERFUSION W WO CONTRAST- 09/29/2020     INDICATION: Neuro deficit, acute, stroke suspected     TECHNIQUE: Cerebral perfusion exam was performed using computed  tomography with IV contrast infusion and postprocessing of parametric  maps with determination of cerebral blood flow, cerebral blood flow,  mean transit time and time to drain.     The radiation dose reduction device was turned on for each scan per the  ALARA (As Low as Reasonably Achievable) protocol.     COMPARISON: Unenhanced head CT scan of same day     FINDINGS: Rapid analysis shows the area of edema along the left sylvian  fissure on the unenhanced CT scan to correspond to a roughly 74 mm  volume area with T-Max of greater than six seconds, consistent with  ischemia. There is no evidence of core infarct, with no areas of the  brain showing less than 30% cerebral blood flow.     Individual perfusion maps show markedly increased mean transit time and  time to drain in this area of the frontal and temporal lobe,  approximately 6 cm in diameter but a more diffuse abnormality throughout  most of the left MCA  territory, including some of the basal ganglia,  suggesting at least a slow flow state. There is less extensive  abnormality on cerebral blood flow images, and essentially normal  cerebral blood volume images.       Impression:      1. Focal ischemia in the left frontotemporal region, calculated at 74 mL  in volume, and approximately 6 cm in axial diameter. No evidence of  underlying core infarct.     2. Remaining imaging parameters suggest slow flow state throughout most  of the traditional left MCA territory.     D:  09/29/2020  E:  09/29/2020     This report was finalized on 9/29/2020 10:31 PM by Dr. Jeramie Kemp MD.       CT Head Without Contrast Stroke Protocol [[id number]] Collected: 09/29/20 1329     Updated: 09/29/20 2134    Narrative:      EXAMINATION: CT HEAD WO CONTRAST, STROKE PROTOCOL-09/29/2020:      INDICATION: Stroke, follow-up.     TECHNIQUE: 5 mm unenhanced images through the brain.     The radiation dose reduction device was turned on for each scan per the  ALARA (As Low as Reasonably Achievable) protocol.     COMPARISON: NONE.     FINDINGS: The calvarium appears intact. Included paranasal sinuses and  mastoids appear clear. Soft tissue window images show some low-density  changes and loss of gray/white matter interface along the left sylvian  fissure, most notably in the insular cortex, consistent with early  edema. There is no evidence of edema elsewhere, no evidence of mass,  mass effect, hemorrhage, hydrocephalus, or abnormal extra-axial  collection. There is expected degree of generalized cerebral atrophy for  the patient's age. No gross abnormalities are noted of the orbits.       Impression:      1. Early left MCA territory edema, generally along the left sylvian  fissure.  2. No evidence of hemorrhage, or evidence of other acute intracranial  disease elsewhere.     NOTE: The exam time is shown as 1:02 PM. The study was reviewed on the  CT scan monitor and discussed with Dr. Canales at 1:07  PM.     D:  09/29/2020  E:  09/29/2020           This report was finalized on 9/29/2020 9:30 PM by Dr. Jeramie Kemp MD.       XR Chest 1 View [[id number]] Collected: 09/29/20 1359     Updated: 09/29/20 2113    Narrative:      EXAMINATION: XR CHEST 1 VW-09/29/2020:     INDICATION: Acute Stroke Protocol (onset < 12 hrs).     COMPARISON: NONE.     FINDINGS: The heart is normal in size. The vasculature appears upper  limits of normal. There is a mild diffuse interstitial disease pattern  which is nonspecific and may be chronic. If interstitial edema, this is  minimal in extent. No lung consolidation, effusion, or pneumothorax is  seen.       Impression:      Mild nonspecific pulmonary interstitial changes as  described. No other evidence of active chest disease is seen.     D:  09/29/2020  E:  09/29/2020            This report was finalized on 9/29/2020 9:10 PM by Dr. Jeramie Kemp MD.       CT Angiogram Head [[id number]] Collected: 09/29/20 1344     Updated: 09/29/20 1658    Narrative:      EXAMINATION: CT ANGIOGRAM HEAD- 09/29/2020     INDICATION: Stroke, follow up     TECHNIQUE: Pre and postcontrast 3 mm and 0.75 mm axial images through  the brain with 2-D and 3-D angiographic reconstructions.     The radiation dose reduction device was turned on for each scan per the  ALARA (As Low as Reasonably Achievable) protocol.     COMPARISON: Unenhanced head CT scan of same date     FINDINGS: Axial postcontrast images show paucity of flow in the left  frontotemporal region corresponding to the area of edema seen on the  unenhanced study. Angiographic images show no contrast in the proximal  left petrous segment, and then briefly normal caliber before there is  moderate stenosis of the distal petrous segment and proximal carotid  siphon, focally at least up to 50 or 60% on thin section axial image 321  series 5. Above this level, the left ICA is a relatively small vessel  with no additional stenosis. Right ICA appears robust and  normal.     There is a relatively small caliber proximal left A1, normal caliber  elsewhere likely as a developmental variant. Remainder of the anterior  cerebral arteries appear grossly normal. M1 segments and proximal M2  segments appear grossly normal. I do not identify a consistent focal  left middle cerebral artery territory stenosis except questionably a  distal branch vessel on sagittal images 10 and 11.     Regarding the posterior circulation, there is a relatively small left  vertebral artery, normal appearing right vertebral artery, and  normal-appearing basilar artery. Posterior cerebral arteries appear  grossly normal, seen in a somewhat limited fashion distally.       Impression:      1. 50-60% left cavernous carotid stenosis.  2. No definite left MCA stenosis or proximal branch stenosis is  identified; questionable distal branch stenosis, [                       ] on sagittal imaging series.     D:  09/29/2020  E:  09/29/2020          CT Angiogram Neck [[id number]] Collected: 09/29/20 1336     Updated: 09/29/20 1505    Narrative:      EXAMINATION: CT ANGIOGRAM NECK- 09/29/2020     INDICATION: STROKE     TECHNIQUE: 3 mm and 0.75 mm axial images through the neck with 2-D  reconstructions and 3-D VRT and MIP angiographic reconstructions.     The radiation dose reduction device was turned on for each scan per the  ALARA (As Low as Reasonably Achievable) protocol.     COMPARISON: NONE     FINDINGS: There is moderately extensive atherosclerotic disease of  subclavian arteries, potentially with high-grade distal left subclavian  stenosis, best appreciated on coronal image 29 series 901.     On the right, there is extensive calcified and noncalcified plaquing of  the common carotid artery, and diffuse narrowing, but generally less  than 50% until the level of the carotid bulb, where there is probably  between 50 and 75% stenosis. There is very tortuous and significantly  narrowed proximal right ICA, as seen on  axial thin section image 199  series 5, probably between 50 and 60% attempting to measure this area by  NASCET criteria. Remainder of the right ICA appears normal in caliber.  There is extensive atherosclerotic disease of the right ECA, with marked  focal narrowing a couple of centimeters beyond its origin.     On the left, there is near occlusion of the mid left CCA, as can be seen  from axial thin section images 101 to 124 series 5, due to calcified and  noncalcified plaque and very irregular small lumen above this level to  the carotid bulb. Left ECA is only mildly narrowed, but the left ICA  appears practically occluded, with no definite contrast up to the level  of the mid petrous segment.     There is a hypoplastic left vertebral artery, which appears occluded and  reconstituted along its midportion, and a large right vertebral artery  with moderate stenosis in the right mid neck, grossly normal caliber  elsewhere.     No evidence of significant soft tissue neck pathology is appreciated.  Incidental note is made of advanced C5-6 and C6-7 degenerative disc  disease.       Impression:      1. Apparently occluded left ICA from the level of the mid left ICA to  the proximal petrous segment.  2. Near occlusion of the mid left common carotid artery.  3. Up to 50-75 % stenosis of the right carotid bulb, tortuous and  narrowed right ICA to between 50 and 60%.  4. 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  5. Large right vertebral artery with moderate right mid vertebral  stenosis in the neck.  6. Probably high-grade distal left subclavian stenosis incidentally  noted.     D:  09/29/2020  E:  09/29/2020                       Results for orders placed during the hospital encounter of 09/29/20   Adult Transthoracic Echo Complete W/ Cont if Necessary Per Protocol (With Agitated Saline)    Narrative · Trace mitral valve regurgitation is present.  · Estimated left ventricular EF = 70% Left ventricular  systolic function   is normal.          Plan for Follow-up of Pending Labs/Results:None pending    Discharge Details        Discharge Medications      New Medications      Instructions Start Date   Aspirin Adult Low Strength 81 MG EC tablet  Generic drug: aspirin   81 mg, Oral, Daily   Start Date: October 3, 2020     atorvastatin 40 MG tablet  Commonly known as: LIPITOR   80 mg, Oral, Nightly      Eliquis 5 MG tablet tablet  Generic drug: apixaban   5 mg, Oral, Every 12 Hours Scheduled         Continue These Medications      Instructions Start Date   bimatoprost 0.01 % ophthalmic drops  Commonly known as: LUMIGAN   1 drop, Left Eye, Nightly      brimonidine-timolol 0.2-0.5 % ophthalmic solution  Commonly known as: COMBIGAN   Every 12 Hours      dorzolamide-timolol 22.3-6.8 MG/ML ophthalmic solution  Commonly known as: COSOPT   1 drop, Left Eye, 2 Times Daily             No Known Allergies      Discharge Disposition:  Home or Self CareHome    Diet:  Hospital:  Diet Order   Procedures   • Diet Regular; Thin; Cardiac       Activity:  As tolerated    Restrictions or Other Recommendations:  As tolerated       CODE STATUS:    Code Status and Medical Interventions:   Ordered at: 09/29/20 9107     Level Of Support Discussed With:    Patient     Code Status:    CPR     Medical Interventions (Level of Support Prior to Arrest):    Full       Future Appointments   Date Time Provider Department Center   10/9/2020  9:00 AM Sharron Posadas APRN [facility] None   12/30/2020  2:30 PM Anjel Covarrubias MD [facility]       Additional Instructions for the Follow-ups that You Need to Schedule     Discharge Follow-up with PCP   As directed       Currently Documented PCP:    Provider, No Known    PCP Phone Number:    None     Follow Up Details: Dr Hsieh in 1-2 weeks         Discharge Follow-up with Specified Provider: Neurology 4-6 weeks; 6 Weeks   As directed      To: Neurology 4-6 weeks    Follow Up: 6 Weeks                    Electronically signed by Jenna Otero MD, 10/02/20, 12:35 PM EDT.      Time Spent on Discharge:  I spent  25 minutes on this discharge activity which included: face-to-face encounter with the patient, reviewing the data in the system, coordination of the care with the nursing staff as well as consultants, documentation, and entering orders.

## 2020-10-01 NOTE — PLAN OF CARE
Goal Outcome Evaluation:  Plan of Care Reviewed With: patient, spouse  Progress: improving  Outcome Summary: A&Ox4, VSS, NSR, on RA. NIH 0. Wife at bedside today and updated on POC. Neuro wants to keep patient overnight to observe after starting eliquis. Denies any pain or further needs at this time.

## 2020-10-01 NOTE — PROGRESS NOTES
[facility] Medicine Services  PROGRESS NOTE    Patient Name: Patrick Shay  : 1955  MRN: [id number]    Date of Admission: 2020  Primary Care Physician: Provider, No Known    Subjective   Subjective     CC:  stroke    HPI:  Patient and wife at bedside, spoke with patient via pessary valve. He feels 100% back to normal and would like to go home today.      Review of Systems  Gen- No fevers, chills  CV- No chest pain, palpitations  Resp- No cough, dyspnea  GI- No N/V/D, abd pain        Objective   Objective     Vital Signs:   Temp:  [98.1 °F (36.7 °C)-100.3 °F (37.9 °C)] 98.1 °F (36.7 °C)  Heart Rate:  [] 98  Resp:  [16-18] 16  BP: (113-175)/() 137/77  Total (NIH Stroke Scale): 2     Physical Exam:  GEN- no acute distress noted, resting in bed, awake  HEENT- atraumatic, trach in place with pessary valve   NECK- supple, trachea midline, no masses  RESP: ctab, normal effort  CV: no murmurs, s1/s2, rrr  MSK: no edema noted, spontaneous movement of all extremities  NEURO: alert, oriented, no focal deficits  SKIN: no rashes  PSYCH: appropriate mood and affect          Results Reviewed:  Results from last 7 days   Lab Units 20  0506 20  1327 20  1322 20  1319   WBC 10*3/mm3 13.29* 8.30  --   --    HEMOGLOBIN g/dL 13.6 13.4  --   --    HEMOGLOBIN, POC g/dL  --   --   --  14.6   HEMATOCRIT % 43.6 42.2  --   --    HEMATOCRIT POC %  --   --   --  43   PLATELETS 10*3/mm3 169 173  --   --    INR   --   --  1.1  --      Results from last 7 days   Lab Units 20  0506 20  1327 20  1320   SODIUM mmol/L 136  --   --    POTASSIUM mmol/L 4.4  --   --    CHLORIDE mmol/L 102  --   --    CO2 mmol/L 22.0  --   --    BUN mg/dL 15  --   --    CREATININE mg/dL 1.13  --  1.30   GLUCOSE mg/dL 101*  --   --    CALCIUM mg/dL 9.0  --   --    ALT (SGPT) U/L 19 21  --    AST (SGOT) U/L 28 33  --    TROPONIN T ng/mL  --  <0.010  --      Estimated Creatinine  Clearance: 63.3 mL/min (by C-G formula based on SCr of 1.13 mg/dL).    Microbiology Results Abnormal     Procedure Component Value - Date/Time    COVID-19, [facility] IN-HOUSE,NP Swab (NO TRANSPORT MEDIA) 2 HR TAT - Swab, Nasopharynx [[id number]]  (Normal) Collected: 09/29/20 1346    Lab Status: Final result Specimen: Swab from Nasopharynx Updated: 09/29/20 1448     COVID19 Not Detected    Narrative:      Fact sheet for providers: https://www.fda.gov/media/590586/download     Fact sheet for patients: https://www.fda.gov/media/630707/download          Imaging Results (Last 24 Hours)     Procedure Component Value Units Date/Time    MRI Brain Without Contrast [[id number]] Collected: 09/29/20 1856     Updated: 09/30/20 1557    Narrative:      EXAMINATION: MRI BRAIN WO CONTRAST-09/29/2020:     INDICATION: Stroke, follow-up; I63.9-Cerebral infarction, unspecified.      TECHNIQUE: Multiplanar MRI of the brain without intravenous contrast.     COMPARISON: CT head and CT cerebral perfusion earlier same day.     FINDINGS: Restricted diffusion left anterior basal ganglia, left insular  cortex and left frontal lobe with localized edema of increased T2 and  FLAIR signal without associated mass effect or midline shift. Background  mild chronic small vessel ischemic disease noted in the supratentorial  white matter. Globes and orbits retain normal T2 signal characteristics.  The visualized paranasal sinuses and mastoid air cells are grossly clear  and well pneumatized with the exception of trace right and small-to-  moderate left mastoid effusions. No cerebellopontine angle mass lesion.  Asymmetric signal flow voids in the left distal internal carotid artery  on limited evaluation. Susceptibility artifact not present on  susceptibility-weighted imaging. Pituitary and sella within normal  limits. Cervicomedullary junction widely patent.       Impression:      1. Restricted diffusion left frontotemporal region as described  above  including left basal ganglia and insular cortex regions of acute  infarction with minimal localized edema, however, no midline shift or  mass effect associated.     2. Asymmetric signal flow voids left distal internal carotid artery  better seen on recent CTA performed earlier same day.     D:  09/29/2020  E:  09/29/2020            This report was finalized on 9/30/2020 3:53 PM by Dr. Rigo Jung.             Results for orders placed during the hospital encounter of 09/29/20   Adult Transthoracic Echo Complete W/ Cont if Necessary Per Protocol (With Agitated Saline)    Narrative · Trace mitral valve regurgitation is present.  · Estimated left ventricular EF = 70% Left ventricular systolic function   is normal.          I have reviewed the medications:  Scheduled Meds:apixaban, 5 mg, Oral, Q12H  aspirin, 81 mg, Oral, Daily  atorvastatin, 40 mg, Oral, Nightly  brimonidine, 1 drop, Left Eye, Q12H  dorzolamide-timolol, , Ophthalmic, BID  latanoprost, 1 drop, Left Eye, Nightly  sodium chloride, 10 mL, Intravenous, Q12H      Continuous Infusions:niCARdipine, 5-15 mg/hr      PRN Meds:.oxyCODONE-acetaminophen  •  sodium chloride    Assessment/Plan   Assessment & Plan     Active Hospital Problems    Diagnosis  POA   • **Acute CVA (cerebrovascular accident) (CMS/HCC) [I63.9]  Yes   • Essential hypertension [I10]  Yes   • Hyperlipidemia LDL goal <100 [E78.5]  Yes   • History of laryngeal cancer [Z85.21]  Not Applicable      Resolved Hospital Problems   No resolved problems to display.        Brief Hospital Course to date:  Patrick Shay is a 65 y.o. male with history of HTN, HLD, laryngeal cancer s/p largynectomy and previous trach who initially presented to [facility] ICU 9/29 with stroke like symptoms - including facial droop and slurred speech. Patient was noted on CT imaging to have concern for early left MCA stroke with edema, he was taken emergently to the cath lab for intervention where he was noted to have stenosis  favored to be chronic and secondary to previous radiation, no intervention was done and no occlusion noted. Patient was not given TPA due to being outside of window. He was transitioned out of ICU and to floor 9/30 and was doing well.    Acute ischemic stroke, left MCA, likely thromboembolic from large vessel dz  ----stroke workup/imaging as above  ---neurology has seen, I have d/w them, patient has occlusion of left ICA origin with reconstitution of the skull base likely collateralization from external carotid artery, left common carotid significant stenosis--- most like the carotid occlusion is chronic and it was not intervened upon as above. After my d/w neurology, starting eliquis today +ASA 81- plan to observe overnight to make sure he tolerates eliuqis well  ----TTE normal EF, no PFO  ----PT/OT has seen  ----stroke labs reviewed    DVT Prophylaxis:  eliquis      Disposition: I expect the patient to be discharged likely tomorrow if tolerates eliquis well     CODE STATUS:   Code Status and Medical Interventions:   Ordered at: 09/29/20 8081     Level Of Support Discussed With:    Patient     Code Status:    CPR     Medical Interventions (Level of Support Prior to Arrest):    Full       Jenna Otero MD  10/01/20

## 2020-10-02 ENCOUNTER — TELEPHONE (OUTPATIENT)
Dept: PEDIATRICS | Facility: OTHER | Age: 65
End: 2020-10-02

## 2020-10-02 ENCOUNTER — TELEPHONE (OUTPATIENT)
Dept: NEUROSURGERY | Facility: CLINIC | Age: 65
End: 2020-10-02

## 2020-10-02 VITALS
HEART RATE: 85 BPM | HEIGHT: 70 IN | SYSTOLIC BLOOD PRESSURE: 139 MMHG | TEMPERATURE: 98 F | WEIGHT: 147.5 LBS | BODY MASS INDEX: 21.11 KG/M2 | DIASTOLIC BLOOD PRESSURE: 82 MMHG | OXYGEN SATURATION: 96 % | RESPIRATION RATE: 18 BRPM

## 2020-10-02 LAB
ANION GAP SERPL CALCULATED.3IONS-SCNC: 9 MMOL/L (ref 5–15)
BASOPHILS # BLD AUTO: 0.08 10*3/MM3 (ref 0–0.2)
BASOPHILS NFR BLD AUTO: 0.8 % (ref 0–1.5)
BUN SERPL-MCNC: 13 MG/DL (ref 8–23)
BUN/CREAT SERPL: 12.1 (ref 7–25)
CALCIUM SPEC-SCNC: 9.2 MG/DL (ref 8.6–10.5)
CHLORIDE SERPL-SCNC: 99 MMOL/L (ref 98–107)
CO2 SERPL-SCNC: 27 MMOL/L (ref 22–29)
CREAT SERPL-MCNC: 1.07 MG/DL (ref 0.76–1.27)
DEPRECATED RDW RBC AUTO: 45 FL (ref 37–54)
EOSINOPHIL # BLD AUTO: 0.29 10*3/MM3 (ref 0–0.4)
EOSINOPHIL NFR BLD AUTO: 3 % (ref 0.3–6.2)
ERYTHROCYTE [DISTWIDTH] IN BLOOD BY AUTOMATED COUNT: 13.5 % (ref 12.3–15.4)
GFR SERPL CREATININE-BSD FRML MDRD: 69 ML/MIN/1.73
GFR SERPL CREATININE-BSD FRML MDRD: 84 ML/MIN/1.73
GLUCOSE SERPL-MCNC: 116 MG/DL (ref 65–99)
HCT VFR BLD AUTO: 44 % (ref 37.5–51)
HGB BLD-MCNC: 14 G/DL (ref 13–17.7)
IMM GRANULOCYTES # BLD AUTO: 0.05 10*3/MM3 (ref 0–0.05)
IMM GRANULOCYTES NFR BLD AUTO: 0.5 % (ref 0–0.5)
LYMPHOCYTES # BLD AUTO: 0.83 10*3/MM3 (ref 0.7–3.1)
LYMPHOCYTES NFR BLD AUTO: 8.6 % (ref 19.6–45.3)
MCH RBC QN AUTO: 29 PG (ref 26.6–33)
MCHC RBC AUTO-ENTMCNC: 31.8 G/DL (ref 31.5–35.7)
MCV RBC AUTO: 91.3 FL (ref 79–97)
MONOCYTES # BLD AUTO: 0.95 10*3/MM3 (ref 0.1–0.9)
MONOCYTES NFR BLD AUTO: 9.8 % (ref 5–12)
NEUTROPHILS NFR BLD AUTO: 7.46 10*3/MM3 (ref 1.7–7)
NEUTROPHILS NFR BLD AUTO: 77.3 % (ref 42.7–76)
NRBC BLD AUTO-RTO: 0 /100 WBC (ref 0–0.2)
PLATELET # BLD AUTO: 161 10*3/MM3 (ref 140–450)
PMV BLD AUTO: 10.5 FL (ref 6–12)
POTASSIUM SERPL-SCNC: 3.5 MMOL/L (ref 3.5–5.2)
RBC # BLD AUTO: 4.82 10*6/MM3 (ref 4.14–5.8)
SODIUM SERPL-SCNC: 135 MMOL/L (ref 136–145)
WBC # BLD AUTO: 9.66 10*3/MM3 (ref 3.4–10.8)

## 2020-10-02 PROCEDURE — 80048 BASIC METABOLIC PNL TOTAL CA: CPT | Performed by: INTERNAL MEDICINE

## 2020-10-02 PROCEDURE — 99238 HOSP IP/OBS DSCHRG MGMT 30/<: CPT | Performed by: INTERNAL MEDICINE

## 2020-10-02 PROCEDURE — 85025 COMPLETE CBC W/AUTO DIFF WBC: CPT | Performed by: INTERNAL MEDICINE

## 2020-10-02 PROCEDURE — 99231 SBSQ HOSP IP/OBS SF/LOW 25: CPT | Performed by: NURSE PRACTITIONER

## 2020-10-02 RX ORDER — ATORVASTATIN CALCIUM 40 MG/1
80 TABLET, FILM COATED ORAL NIGHTLY
Qty: 30 TABLET | Refills: 3 | Status: SHIPPED | OUTPATIENT
Start: 2020-10-02 | End: 2020-10-05

## 2020-10-02 RX ORDER — ATORVASTATIN CALCIUM 40 MG/1
40 TABLET, FILM COATED ORAL NIGHTLY
Qty: 30 TABLET | Refills: 3 | Status: SHIPPED | OUTPATIENT
Start: 2020-10-02 | End: 2020-10-02

## 2020-10-02 RX ORDER — ASPIRIN 81 MG/1
81 TABLET ORAL DAILY
Qty: 30 TABLET | Refills: 3 | Status: SHIPPED | OUTPATIENT
Start: 2020-10-03 | End: 2020-10-05

## 2020-10-02 RX ADMIN — ASPIRIN 81 MG: 81 TABLET, COATED ORAL at 09:31

## 2020-10-02 RX ADMIN — APIXABAN 5 MG: 5 TABLET, FILM COATED ORAL at 09:31

## 2020-10-02 RX ADMIN — DORZOLAMIDE HYDROCHLORIDE: 20 SOLUTION/ DROPS OPHTHALMIC at 09:32

## 2020-10-02 RX ADMIN — SODIUM CHLORIDE, PRESERVATIVE FREE 10 ML: 5 INJECTION INTRAVENOUS at 09:31

## 2020-10-02 RX ADMIN — BRIMONIDINE TARTRATE 1 DROP: 2 SOLUTION OPHTHALMIC at 09:33

## 2020-10-02 NOTE — PROGRESS NOTES
Stroke Progress Note       Chief Complaint:  Speech difficulty    Subjective    Subjective     Subjective  Resting in bed. Started Eliquis yesterday, tolerating well, no s/s of bleeding. Ready to go home.    Review of Systems   Constitutional: No fatigue  HENT: Negative for nosebleeds and rhinorrhea.    Eyes: Negative for redness.   Respiratory: Negative for cough.    Gastrointestinal: Negative for anal bleeding.   Endocrine: Negative for polydipsia.   Genitourinary: Negative for enuresis and urgency.   Musculoskeletal: Negative for joint swelling.   Neurological: Negative for tremors.   Psychiatric/Behavioral: Negative for hallucinations.     Objective      Temp:  [96.6 °F (35.9 °C)-99.3 °F (37.4 °C)] 97.5 °F (36.4 °C)  Heart Rate:  [] 72  Resp:  [14-18] 16  BP: (102-142)/(65-89) 102/72    GEN: NAD, pleasant, cooperative  Eyes-show anicteric sclera, moist conjunctiva with no lid lag, no redness  Neck-trachea midline.  There is no thyromegaly.  ENMT-oropharynx clear with moist mucous membranes and good dentition.  Skin-no rash, lesions or ulcers.  Cardiovascular exam-no pedal edema, regular rate and rhythm.  CHEST: No signs of resp distress, on room air  Abdomen-no abdominal distention, nontender.  Psychiatric exam-alert oriented x3 with intact judgment and insight      NEURO    MENTAL STATUS: AAOx3, memory intact, fund of knowledge appropriate    LANG/SPEECH:  follows 3-step commands, uses voicebox for speech    CRANIAL NERVES:      II: Pupils equal and reactive, no RAPD, no VF deficits, fundus(not done)      III, IV, VI: EOM intact, no gaze preference or deviation, no nystagmus.      V: normal sensation in V1, V2, and V3 segments bilaterally      VII: no asymmetry, no nasolabial fold flattening      VIII: normal hearing to speech      IX, X: normal palatal elevation, no uvular deviation      XI: 5/5 head turn and 5/5 shoulder shrug bilaterally      XII: midline tongue protrusion    MOTOR:  Normal tone  throughout  5/5 muscle power in Rt shoulder abductors/adductors, elbow flexors/extensors, wrist flexors/extensors, finger abductors/adductors.  5/5 in Rt hip flexors/extensors, knee flexors/extensors, ankle dorsiflexors and plantar flexors.    5/5 muscle power in Lt shoulder abductors/adductors, elbow flexors/extensors, wrist flexors/extensors, finger abductors/adductors.  5/5 in Lt hip flexors/extensors, knee flexors/extensors, ankle dorsiflexors and plantea flexors.    SENSORY: intact to light touch    GAIT: Not assessed    Results Review:    I reviewed the patient's new clinical results.    Lab Results (last 24 hours)     Procedure Component Value Units Date/Time    Basic Metabolic Panel [[id number]]  (Abnormal) Collected: 10/02/20 0759    Specimen: Blood Updated: 10/02/20 0924     Glucose 116 mg/dL      BUN 13 mg/dL      Creatinine 1.07 mg/dL      Sodium 135 mmol/L      Potassium 3.5 mmol/L      Chloride 99 mmol/L      CO2 27.0 mmol/L      Calcium 9.2 mg/dL      eGFR  African Amer 84 mL/min/1.73      eGFR Non African Amer 69 mL/min/1.73      BUN/Creatinine Ratio 12.1     Anion Gap 9.0 mmol/L     Narrative:      GFR Normal >60  Chronic Kidney Disease <60  Kidney Failure <15      CBC & Differential [[id number]]  (Abnormal) Collected: 10/02/20 0759    Specimen: Blood Updated: 10/02/20 0852    Narrative:      The following orders were created for panel order CBC & Differential.  Procedure                               Abnormality         Status                     ---------                               -----------         ------                     CBC Auto Differential[[id number]]        Abnormal            Final result                 Please view results for these tests on the individual orders.    CBC Auto Differential [[id number]]  (Abnormal) Collected: 10/02/20 0759    Specimen: Blood Updated: 10/02/20 0852     WBC 9.66 10*3/mm3      RBC 4.82 10*6/mm3      Hemoglobin 14.0 g/dL      Hematocrit 44.0 %      MCV 91.3  fL      MCH 29.0 pg      MCHC 31.8 g/dL      RDW 13.5 %      RDW-SD 45.0 fl      MPV 10.5 fL      Platelets 161 10*3/mm3      Neutrophil % 77.3 %      Lymphocyte % 8.6 %      Monocyte % 9.8 %      Eosinophil % 3.0 %      Basophil % 0.8 %      Immature Grans % 0.5 %      Neutrophils, Absolute 7.46 10*3/mm3      Lymphocytes, Absolute 0.83 10*3/mm3      Monocytes, Absolute 0.95 10*3/mm3      Eosinophils, Absolute 0.29 10*3/mm3      Basophils, Absolute 0.08 10*3/mm3      Immature Grans, Absolute 0.05 10*3/mm3      nRBC 0.0 /100 WBC         No radiology results for the last day  Results for orders placed during the hospital encounter of 09/29/20   Adult Transthoracic Echo Complete W/ Cont if Necessary Per Protocol (With Agitated Saline)    Narrative · Trace mitral valve regurgitation is present.  · Estimated left ventricular EF = 70% Left ventricular systolic function   is normal.                Assessment/Plan     Assessment/Plan:        This is a 65-year-old, , right-handed male with known medical diagnosis of laryngeal cancer status post radiation, hypertension, and hyperlipidemia for an acute onset of confusion and speech problems.  Not a candidate for TPA as he was out of window.  Because of the fluctuating nature of symptoms, patient was taken to the Cath Lab emergently to evaluate the carotid stenosis and MCA occlusion for possible stent and a thrombectomy.  However, the occlusion seems to be chronic, neither stent or thrombectomy was performed.     Diagnostics-  -CT head-early ischemic changes of the left MCA, aspects of 7  -CT head and neck, left MCA superior division occlusion, left ICA occlusion at the origin with reconstitution of the skull base likely collateralization from external carotid artery, left common carotid significant stenosis.  -MRI brain-  left frontotemporal infarct involving basal ganglia and insular cortex.  No microhemorrhages GRE sequences.   -Transthoracic echo-72% EF, normal  left atrium, no PFO  -LDL 65 , A1c 5.6     #1  a. Acute ischemic stroke, left middle cerebral artery, likely etiology atheroembolic from large vessel disease.  Patient has significant stenosis of the left common carotid, occlusion of the left ICA with reconstitution at the base of the skull, with superior MCA division occlusion.  Most likely the carotid occlusion seems to be chronic, and this could be stump emboli occluding left superior division MCA.  b. Stroke secondary prevention, eliquis  5mg BID + Aspirin 81, followed by repeat CTA in 3 months, may transition to DAPT later.  c. Stroke recovery- Activity, PT/OT/Speech-ok to discharge home.  d. Stroke education-educated on adherence of medications.  And stroke modifiable risk factors.                  #2 Hypertension-normalize blood pressure goals  #3 Hypothyroidism-continue home medication   #4 Hyperlipidemia- goal less than < 70. Cont high intensity statin Lipitor  80        Neurology will sign off. Thank you for the consult.       NAS Cuadra  10/02/20  10:05 EDT

## 2020-10-02 NOTE — PROGRESS NOTES
Patient is on Apixaban.  Education provided on 10/2/2020 verbally and in writing.  Information provided includes effects of medication, drug-drug and drug-food interactions, and signs/symptoms of bleeding and clotting.  Patient verbalized understanding through teach back.  All pertinent questions were answered.     Michelle Phillips, PharmD, BCPS  10/2/2020  11:31 EDT

## 2020-10-02 NOTE — TELEPHONE ENCOUNTER
Caller: [facility]    Relationship to patient: HOSPITAL     Best call back number: [phone]    New or established patient?  [x] New  [] Established    Date of discharge: 10/02/2020    Facility discharged from: [facility]    Diagnosis/Symptoms: STROKE    Length of stay (If applicable): 3 DAYS

## 2020-10-02 NOTE — PROGRESS NOTES
Continued Stay Note  [facility]     Patient Name: Patrick Shay  MRN: [id number]  Today's Date: 10/2/2020    Admit Date: 9/29/2020    Discharge Plan     Row Name 10/02/20 1121       Plan    Plan  Home with family    Plan Comments  I spoke with patient and his significant other, Abeba, who was at bedside. No current discharge planning needs identified. He plans to use [facility]. PCP being arranged through the [facility].    Final Discharge Disposition Code  01 - home or self-care        Discharge Codes    No documentation.       Expected Discharge Date and Time     Expected Discharge Date Expected Discharge Time    Oct 2, 2020             Jenna Stevens RN

## 2020-10-03 ENCOUNTER — READMISSION MANAGEMENT (OUTPATIENT)
Dept: CALL CENTER | Facility: HOSPITAL | Age: 65
End: 2020-10-03

## 2020-10-03 NOTE — OUTREACH NOTE
Prep Survey      Responses   [facility] patient discharged from?  [place]   Is LACE score < 7 ?  No   Eligibility  [facility]   Date of Admission  09/29/20   Date of Discharge  10/02/20   Discharge diagnosis  Acute CVA (cerebrovascular accident   Does the patient have one of the following disease processes/diagnoses(primary or secondary)?  Stroke (TIA)   Does the patient have Home health ordered?  No   Is there a DME ordered?  No   Prep survey completed?  Yes          Genesis Hays RN

## 2020-10-05 ENCOUNTER — TRANSITIONAL CARE MANAGEMENT TELEPHONE ENCOUNTER (OUTPATIENT)
Dept: CALL CENTER | Facility: HOSPITAL | Age: 65
End: 2020-10-05

## 2020-10-05 RX ORDER — ASPIRIN 81 MG/1
81 TABLET ORAL DAILY
Qty: 30 TABLET | Refills: 3 | Status: ON HOLD | OUTPATIENT
Start: 2020-10-05 | End: 2021-04-23

## 2020-10-05 RX ORDER — ATORVASTATIN CALCIUM 40 MG/1
80 TABLET, FILM COATED ORAL NIGHTLY
Qty: 30 TABLET | Refills: 3 | Status: ON HOLD | OUTPATIENT
Start: 2020-10-05 | End: 2021-04-23

## 2020-10-05 NOTE — OUTREACH NOTE
Call Center TCM Note      Responses   [facility] patient discharged from?  [place]   Does the patient have one of the following disease processes/diagnoses(primary or secondary)?  Stroke (TIA)   TCM attempt successful?  Yes   Call start time  0915   Discharge diagnosis  Acute CVA (cerebrovascular accident   Meds reviewed with patient/caregiver?  Yes   Does the patient have all medications ordered at discharge?  Yes   Is the patient taking all medications as directed (includes completed medication regime)?  Yes   Does the patient have a primary care provider?   Yes   Does the patient have an appointment with their PCP within 7 days of discharge?  Yes   Comments regarding PCP  Patient has a new PCP appt with Sharron Posadas on 10/09/2020   Has the patient kept scheduled appointments due by today?  N/A   Has home health visited the patient within 72 hours of discharge?  N/A   Has all DME been delivered?  No   Does the patient require any assistance with activities of daily living such as eating, bathing, dressing, walking, etc.?  No   Does the patient have any residual symptoms from stroke/TIA?  No   Does the patient understand the diet ordered at discharge?  No   Did the patient receive a copy of their discharge instructions?  Yes   Nursing interventions  Reviewed instructions with patient   What is the patient's perception of their health status since discharge?  Improving   Nursing interventions  Nurse provided patient education   Is the patient/caregiver able to teach back the risk factors for a stroke?  High blood pressure-goal below 120/80, High Cholesterol, Smoking, Carotid or other artery disease, Physical inactivity and obesity   Is the patient/caregiver able to teach back signs and symptoms related to disease process for when to call PCP?  Yes   Is the patient/caregiver able to teach back signs and symptoms related to disease process for when to call 911?  Yes   Is the patient/caregiver able to  teach back the hierarchy of who to call/visit for symptoms/problems? PCP, Specialist, Home health nurse, Urgent Care, ED, 911  Yes   TCM call completed?  Yes          Bruce Chavez RN    10/5/2020, 09:21 EDT

## 2020-10-16 ENCOUNTER — READMISSION MANAGEMENT (OUTPATIENT)
Dept: CALL CENTER | Facility: HOSPITAL | Age: 65
End: 2020-10-16

## 2020-10-16 NOTE — OUTREACH NOTE
Stroke Week 2 Survey      Responses   [facility] patient discharged from?  [place]   Does the patient have one of the following disease processes/diagnoses(primary or secondary)?  Stroke (TIA)   Week 2 attempt successful?  Yes   Call start time  1441   Call end time  1443   Discharge diagnosis  Acute CVA (cerebrovascular accident   Is patient permission given to speak with other caregiver?  Yes   Meds reviewed with patient/caregiver?  Yes   Is the patient having any side effects they believe may be caused by any medication additions or changes?  No   Does the patient have all medications ordered at discharge?  Yes   Is the patient taking all medications as directed (includes completed medication regime)?  Yes   Does the patient have a primary care provider?   Yes   Does the patient have an appointment with their PCP within 7 days of discharge?  Yes   Has the patient kept scheduled appointments due by today?  Yes   Has home health visited the patient within 72 hours of discharge?  N/A   Has all DME been delivered?  No   Psychosocial issues?  No   Does the patient require any assistance with activities of daily living such as eating, bathing, dressing, walking, etc.?  No   Does the patient have any residual symptoms from stroke/TIA?  No   Does the patient understand the diet ordered at discharge?  No   Did the patient receive a copy of their discharge instructions?  Yes   Nursing interventions  Reviewed instructions with patient   What is the patient's perception of their health status since discharge?  Improving   Nursing interventions  Nurse provided patient education   Is the patient able to teach back FAST for Stroke?  Yes   Is the patient/caregiver able to teach back the risk factors for a stroke?  High blood pressure-goal below 120/80, Physical inactivity and obesity, High Cholesterol   Is the patient/caregiver able to teach back signs and symptoms related to disease process for when to call PCP?  Yes    Is the patient/caregiver able to teach back signs and symptoms related to disease process for when to call 911?  Yes   Is the patient/caregiver able to teach back the hierarchy of who to call/visit for symptoms/problems? PCP, Specialist, Home health nurse, Urgent Care, ED, 911  Yes   Week 2 call completed?  Yes          José Luis Valencia RN

## 2020-10-23 ENCOUNTER — READMISSION MANAGEMENT (OUTPATIENT)
Dept: CALL CENTER | Facility: HOSPITAL | Age: 65
End: 2020-10-23

## 2020-10-23 NOTE — OUTREACH NOTE
Stroke Week 3 Survey      Responses   [facility] patient discharged from?  [place]   Does the patient have one of the following disease processes/diagnoses(primary or secondary)?  Stroke (TIA)   Week 3 attempt successful?  No   Unsuccessful attempts  Attempt 1          José Luis Valencia RN

## 2020-10-26 ENCOUNTER — READMISSION MANAGEMENT (OUTPATIENT)
Dept: CALL CENTER | Facility: HOSPITAL | Age: 65
End: 2020-10-26

## 2020-10-26 NOTE — OUTREACH NOTE
Stroke Week 3 Survey      Responses   [facility] patient discharged from?  [place]   Does the patient have one of the following disease processes/diagnoses(primary or secondary)?  Stroke (TIA)   Week 3 attempt successful?  Yes   Call start time  1743   Rescheduled  Rescheduled-pt requested   Discharge diagnosis  Acute CVA (cerebrovascular accident   Person spoke with today (if not patient) and relationship  Spoke with wife she is not home, at a school function. Patient phone number has changed, updated. No answer to patient phone.          Tala Santiago RN

## 2020-10-28 ENCOUNTER — READMISSION MANAGEMENT (OUTPATIENT)
Dept: CALL CENTER | Facility: HOSPITAL | Age: 65
End: 2020-10-28

## 2020-10-28 NOTE — OUTREACH NOTE
Stroke Week 3 Survey      Responses   [facility] patient discharged from?  [place]   Does the patient have one of the following disease processes/diagnoses(primary or secondary)?  Stroke (TIA)   Week 3 attempt successful?  Yes   Call start time  1136   Call end time  1138   General alerts for this patient  trach   Discharge diagnosis  Acute CVA (cerebrovascular accident   Is patient permission given to speak with other caregiver?  Yes   Person spoke with today (if not patient) and relationship  wife and patient   Meds reviewed with patient/caregiver?  Yes   Is the patient having any side effects they believe may be caused by any medication additions or changes?  No   Does the patient have all medications ordered at discharge?  Yes   Is the patient taking all medications as directed (includes completed medication regime)?  Yes   Does the patient have a primary care provider?   Yes   Does the patient have an appointment with their PCP within 7 days of discharge?  Yes   Has the patient kept scheduled appointments due by today?  Yes   Has home health visited the patient within 72 hours of discharge?  N/A   Has all DME been delivered?  No   Psychosocial issues?  No   Does the patient require any assistance with activities of daily living such as eating, bathing, dressing, walking, etc.?  No   Does the patient have any residual symptoms from stroke/TIA?  No   Does the patient understand the diet ordered at discharge?  Yes   Did the patient receive a copy of their discharge instructions?  Yes   Nursing interventions  Reviewed instructions with patient   What is the patient's perception of their health status since discharge?  Improving   Nursing interventions  Nurse provided patient education   Is the patient able to teach back FAST for Stroke?  Yes   Is the patient/caregiver able to teach back the risk factors for a stroke?  High blood pressure-goal below 120/80, Physical inactivity and obesity, High Cholesterol    Is the patient/caregiver able to teach back signs and symptoms related to disease process for when to call PCP?  Yes   Is the patient/caregiver able to teach back signs and symptoms related to disease process for when to call 911?  Yes   If the patient is a current smoker, are they able to teach back resources for cessation?  Not a smoker   Is the patient/caregiver able to teach back the hierarchy of who to call/visit for symptoms/problems? PCP, Specialist, Home health nurse, Urgent Care, ED, 911  Yes   Week 3 call completed?  Yes          Bruce Chavez RN

## 2020-11-04 ENCOUNTER — READMISSION MANAGEMENT (OUTPATIENT)
Dept: CALL CENTER | Facility: HOSPITAL | Age: 65
End: 2020-11-04

## 2020-11-04 NOTE — OUTREACH NOTE
Stroke Week 4 Survey      Responses   [facility] [place] patient discharged from?  [place]   Does the patient have one of the following disease processes/diagnoses(primary or secondary)?  Stroke (TIA)   Week 4 attempt successful?  Yes   Call start time  1657   Call end time  1659   Discharge diagnosis  Acute CVA (cerebrovascular accident   Meds reviewed with patient/caregiver?  Yes   Is the patient taking all medications as directed (includes completed medication regime)?  Yes   Has the patient kept scheduled appointments due by today?  Yes   Is the patient still receiving Home Health Services?  N/A   Does the patient require any assistance with activities of daily living such as eating, bathing, dressing, walking, etc.?  No   Does the patient have any residual symptoms from stroke/TIA?  No   Does the patient understand the diet ordered at discharge?  Yes   What is the patient's perception of their health status since discharge?  Returned to baseline/stable   Is the patient able to teach back FAST for Stroke?  Yes   Is the patient/caregiver able to teach back the risk factors for a stroke?  High blood pressure-goal below 120/80, Physical inactivity and obesity, High Cholesterol   Week 4 Call Completed?  Yes   Would the patient like one additional call?  No   Graduated  Yes   Did the patient feel the follow up calls were helpful during their recovery period?  Yes          Tala Santiago RN

## 2020-12-06 ENCOUNTER — APPOINTMENT (OUTPATIENT)
Dept: PREADMISSION TESTING | Facility: HOSPITAL | Age: 65
End: 2020-12-06

## 2020-12-06 PROCEDURE — U0004 COV-19 TEST NON-CDC HGH THRU: HCPCS

## 2020-12-06 PROCEDURE — C9803 HOPD COVID-19 SPEC COLLECT: HCPCS

## 2020-12-07 LAB — SARS-COV-2 RNA RESP QL NAA+PROBE: NOT DETECTED

## 2020-12-15 ENCOUNTER — OFFICE VISIT (OUTPATIENT)
Dept: NEUROLOGY | Facility: CLINIC | Age: 65
End: 2020-12-15

## 2020-12-15 VITALS
OXYGEN SATURATION: 92 % | DIASTOLIC BLOOD PRESSURE: 80 MMHG | SYSTOLIC BLOOD PRESSURE: 112 MMHG | HEART RATE: 117 BPM | TEMPERATURE: 97.7 F

## 2020-12-15 DIAGNOSIS — I69.30 SEQUELAE, POST-STROKE: Primary | ICD-10-CM

## 2020-12-15 DIAGNOSIS — I48.91 ATRIAL FIBRILLATION, UNSPECIFIED TYPE (HCC): ICD-10-CM

## 2020-12-15 PROCEDURE — 99215 OFFICE O/P EST HI 40 MIN: CPT | Performed by: PSYCHIATRY & NEUROLOGY

## 2020-12-15 RX ORDER — AMLODIPINE BESYLATE 5 MG/1
5 TABLET ORAL DAILY
COMMUNITY
End: 2021-10-14

## 2020-12-15 RX ORDER — LEVOTHYROXINE SODIUM 137 UG/1
137 TABLET ORAL DAILY
COMMUNITY

## 2020-12-15 NOTE — PROGRESS NOTES
Subjective:    CC: Patrick Shay is seen today in consultation at the request of hospital for Stroke       HPI:  65-year-old male accompanied by his girlfriend with a history of hypertension, hyperlipidemia, laryngeal cancer status post tracheostomy in 1998 (uses a voice assist device), legally blind in left eye due to eye injury presents as a hospital follow-up for stroke.  Patient states that he had difficulty getting his words out on 9/29.  He was brought to the hospital where an initial CT scan of the head edema in the left frontal region. After that he had a CT angiogram of the head which showed 50 to 60% of the left cavernous carotid stenosis and superior MCA division occlusion and a CT angiogram of the neck that showed near occlusion of the mid left common carotid artery as well as possible occlusion of the left ICA from the level of the mid left ICA to the proximal petrous segment.  Also 50 to 75% stenosis of the right carotid bulb and narrowing of right ICA to between 50 to 60% noted as well as moderate right mid vertebral stenosis.  CT perfusion showed focal ischemia in the left frontotemporal region.  MRI brain showed an area of restricted diffusion in the left frontotemporal region including the left basal ganglia and insular cortex.  There was some question of whether the patient was taking Xarelto however patient denies that and states that his mother was on Xarelto not him.  He does not have a known history of atrial fibrillation.  Was started on Eliquis 5 mg twice a day (for presumable cardio embolic source) and aspirin 81 mg daily in addition to Lipitor 80 mg daily.  His lipid panel was as follows-, TG 96, LDL 65, HDL 37.  A1c was 5.6.  He states that his speech problems have now subsided.  Of note-I personally reviewed his CT angiogram of the head and neck and his MRI brain as well as 's note    The following portions of the patient's history were reviewed today and updated as  of 12/15/2020  : allergies, current medications, past family history, past medical history, past social history, past surgical history and problem list  These document will be scanned to patient's chart.      Current Outpatient Medications:   •  amLODIPine (NORVASC) 5 MG tablet, Take 5 mg by mouth Daily., Disp: , Rfl:   •  apixaban (ELIQUIS) 5 MG tablet tablet, Take 1 tablet by mouth Every 12 (Twelve) Hours. Indications: stroke, Disp: 60 tablet, Rfl: 3  •  aspirin 81 MG EC tablet, Take 1 tablet by mouth Daily., Disp: 30 tablet, Rfl: 3  •  atorvastatin (LIPITOR) 40 MG tablet, Take 2 tablets by mouth Every Night., Disp: 30 tablet, Rfl: 3  •  bimatoprost (LUMIGAN) 0.01 % ophthalmic drops, Administer 1 drop into the left eye Every Night., Disp: , Rfl:   •  brimonidine-timolol (COMBIGAN) 0.2-0.5 % ophthalmic solution, Every 12 (Twelve) Hours., Disp: , Rfl:   •  dorzolamide-timolol (COSOPT) 22.3-6.8 MG/ML ophthalmic solution, Administer 1 drop into the left eye 2 (Two) Times a Day., Disp: , Rfl:   •  levothyroxine (SYNTHROID, LEVOTHROID) 137 MCG tablet, Take 137 mcg by mouth Daily., Disp: , Rfl:    Past Medical History:   Diagnosis Date   • Disease of thyroid gland    • Hyperlipidemia    • Hypertension    • Laryngeal cancer (CMS/HCC)       Past Surgical History:   Procedure Laterality Date   • INTERVENTIONAL RADIOLOGY PROCEDURE N/A 9/29/2020    Procedure: IR MECHANICAL THROMBECTOMY - PRIMARY;  Surgeon: Aidan Plasencia MD;  Location: [facility] INVASIVE LOCATION;  Service: Interventional Radiology;  Laterality: N/A;   • MANDIBLE SURGERY     • TRACHEOSTOMY        No family history on file.   Social History     Socioeconomic History   • Marital status:      Spouse name: Not on file   • Number of children: Not on file   • Years of education: Not on file   • Highest education level: Not on file   Tobacco Use   • Smoking status: Former Smoker   Substance and Sexual Activity   • Alcohol use: Not Currently   •  Drug use: Never     Review of Systems   Neurological: Positive for speech difficulty.   All other systems reviewed and are negative.      Objective:    /80   Pulse 117   Temp 97.7 °F (36.5 °C)   SpO2 92%     Neurology Exam:    General apperance: NAD.     Mental status: Alert, awake and oriented to time place and person.    Recent and Remote memory: Intact.    Attention span and Concentration: Normal.     Language and Speech: Intact- moderate dysarthria.  Using a voice assist device to speak    Fluency, Naming , Repitition and Comprehension:  Intact    Cranial Nerves:   CN II: Visual fields Intact in right eye.  He is legally blind in left eye.  Pupils - CHANTEL  CN III, IV and VI: Extraocular movements are intact in right eye.  He could not AB duct his left eye.    CN V: Facial sensation is intact.   CN VII: Muscles of facial expression reveal no asymmetry. Intact.   CN VIII: Hearing is intact. Whispered voice intact.   CN IX and X: Palate elevates symmetrically. Intact  CN XI: Shoulder shrug is intact.   CN XII: Tongue is midline without evidence of atrophy or fasciculation.     Ophthalmoscopic exam of optic disc-poorly visualized    Motor:  Right UE muscle strength 5/5. Normal tone.     Left UE muscle strength 5/5. Normal tone.      Right LE muscle strength5/5. Normal tone.     Left LE muscle strength 5/5. Normal tone.      Sensory: Normal light touch, vibration and pinprick sensation bilaterally.    DTRs: 2+ bilaterally in upper and lower extremities.    Babinski: Negative bilaterally.    Co-ordination: Normal finger-to-nose, heel to shin B/L.    Rhomberg: Negative.    Gait: Normal.    Cardiovascular: Regular rate and rhythm without murmur, gallop or rub.    Assessment and Plan:  1. Sequelae, post-stroke  Patient had a left MCA stroke which could have been atheroembolic in nature due to left ICA occlusion (acute versus chronic) with emboli to the superior branch of left MCA versus a cardioembolic  phenomenon  I will get a repeat CT angiogram of the head and neck   I will also get a 4-week Holter monitor to rule out atrial fibrillation  If Holter monitor is negative then I may stop Eliquis and switch him to dual antiplatelets  For now he can continue aspirin 81 mg along with Eliquis 5 mg twice a day  Also continue Lipitor 80 mg daily for goal LDL of less than 100.  His LDL was 65  Goal blood pressure less than 130/90.  On some days patient's blood pressure is extremely low and I have told him to hold his dose of amlodipine if systolic blood pressure less than 100    - CT Angiogram Head; Future  - CT Angiogram Neck; Future  - Cardiac Event Monitor; Future    2. Atrial fibrillation, unspecified type (CMS/HCC)  We will get an event monitor to rule out A. fib  - Cardiac Event Monitor; Future       Return in about 6 weeks (around 1/26/2021).     I spent over 50 minutes with the patient face to face out of which over 50% (30 minutes) was spent in management, instructions and education.     Tyra Barakat MD

## 2020-12-20 ENCOUNTER — APPOINTMENT (OUTPATIENT)
Dept: PREADMISSION TESTING | Facility: HOSPITAL | Age: 65
End: 2020-12-20

## 2020-12-21 ENCOUNTER — TELEPHONE (OUTPATIENT)
Dept: NEUROLOGY | Facility: CLINIC | Age: 65
End: 2020-12-21

## 2020-12-21 NOTE — TELEPHONE ENCOUNTER
JOHN KERN (FIANCE)  [phone]    THE PT'S FIANCE IS CALLING IN BECAUSE THE PT AND HIS FIANCE ARE CONFUSED BECAUSE THEY ARE BEING TOLD ONE THING BY DR SILVER AND ANOTHER BY THE PT'S PCP IN REGARDS TO THE PT'S AMLODIPINE 5 MG. THE PT'S PCP IS STATING IF THE PT'S BLOOD PRESSURE GETS TO LOW TO TAKE HALF OF THE AMLODIPINE. JOHN STATED THAT DR SILVER SAID TO NOT TAKE ANY OF THE AMLODIPINE IF THE PT'S BLOOD PRESSURE WAS TOO LOW. THE PCP ALSO WANTED THE PT TO ASK DR SILVER ABOUT A ULTRASOUND OF THE PT'S ARTERIES PLEASE GIVE THE PT OR PT'S FIANCE A CALL TO INFORM THEM ON WHAT THEY SHOULD DO.

## 2020-12-22 NOTE — TELEPHONE ENCOUNTER
Spoke to memo and informed her Dr. Barakat is out of office until December 28th, and will consult Dr. Barakat clarification based on Amlodipine.

## 2020-12-28 DIAGNOSIS — I69.30 SEQUELAE, POST-STROKE: Primary | ICD-10-CM

## 2020-12-28 DIAGNOSIS — I65.23 BILATERAL CAROTID ARTERY STENOSIS: ICD-10-CM

## 2020-12-28 NOTE — TELEPHONE ENCOUNTER
LVM for patient's fiance informing her that the patient is not to take Amlodipine if BP goes below 100/60. And imaging has been ordered.

## 2020-12-28 NOTE — TELEPHONE ENCOUNTER
Tell the patient that if his blood pressure is too low but is less than 100/60 then he should not take the amlodipine.  Also I have ordered a carotid Doppler for him as well as a CT angiogram of his head and neck to look at his blood vessels

## 2021-01-13 ENCOUNTER — TELEPHONE (OUTPATIENT)
Dept: NEUROLOGY | Facility: CLINIC | Age: 66
End: 2021-01-13

## 2021-01-13 DIAGNOSIS — I69.30 SEQUELAE, POST-STROKE: ICD-10-CM

## 2021-01-13 DIAGNOSIS — I48.91 ATRIAL FIBRILLATION, UNSPECIFIED TYPE (HCC): Primary | ICD-10-CM

## 2021-01-13 NOTE — TELEPHONE ENCOUNTER
JOHN KERN  [phone]    THE PT'S FIANCE CALLED IN BECAUSE SHE IS STATING THAT HER FINANCE WAS SUPPOSE TO HAVE 3 OTHER TEST DONE ALONG WITH HIS ULTRA SOUND HE HAS SCHEDULED. THE PT WAS SUPPOSE TO HAVE 2 CTs DONE. ONE IS OF HIS HEAD AND ONE IS OF HIS NECK AND HE IS SUPPOSE TO GET A HEART MONITOR AND WEAR IT FOR 6 TO 8 WEEKS ACCORDING TO HIS FIANCE. THE PT SCHEDULED TO SEE DR SILVER ON 02/10 AND WILL NOT BE ABLE TO WEAR A HEART MONITOR FOR 6 TO 8 WEEKS. DOES DR SILVER WANT TO RESCHEDULE THE FOLLOW UP FOR A LATER DATE SO THE PT CAN WEAR THE HEART MONITOR FOR THE CORRECT DURATION. PLEASE GIVE THE PT OR HIS FINANCE A CALL TO ADVISE.

## 2021-01-14 ENCOUNTER — TELEPHONE (OUTPATIENT)
Dept: NEUROLOGY | Facility: CLINIC | Age: 66
End: 2021-01-14

## 2021-01-14 DIAGNOSIS — I69.30 SEQUELAE, POST-STROKE: Primary | ICD-10-CM

## 2021-01-14 DIAGNOSIS — I48.91 ATRIAL FIBRILLATION, UNSPECIFIED TYPE (HCC): Primary | ICD-10-CM

## 2021-01-14 NOTE — TELEPHONE ENCOUNTER
Pts wife called back in and stated [facility] told her that they would need another order put in, in order to extend for the CT scan.

## 2021-01-14 NOTE — TELEPHONE ENCOUNTER
Pts wife called in stating they were able to get patient scheduled for his CT scan on January 25th and they received a letter yesterday from [facility] stating it is approved until January 16, 2021. She was worried since his appt wasn't able to be scheduled until the 25th of January. She wanted me to send a message to let Dr. Barakat know. I also advised her to call [facility] and see if they are able to extend it a few days until patient gets the CT scan completed. She understood and said she would call them also.     Thanks

## 2021-01-25 ENCOUNTER — HOSPITAL ENCOUNTER (OUTPATIENT)
Dept: CT IMAGING | Facility: HOSPITAL | Age: 66
Discharge: HOME | End: 2021-01-25
Admitting: PSYCHIATRY & NEUROLOGY

## 2021-01-25 DIAGNOSIS — I69.30 SEQUELAE, POST-STROKE: ICD-10-CM

## 2021-01-25 LAB — CREAT BLDA-MCNC: 1.2 MG/DL (ref 0.6–1.3)

## 2021-01-25 PROCEDURE — 70496 CT ANGIOGRAPHY HEAD: CPT

## 2021-01-25 PROCEDURE — 70498 CT ANGIOGRAPHY NECK: CPT

## 2021-01-25 PROCEDURE — 0 IOPAMIDOL PER 1 ML: Performed by: PSYCHIATRY & NEUROLOGY

## 2021-01-25 PROCEDURE — 82565 ASSAY OF CREATININE: CPT

## 2021-01-25 RX ADMIN — IOPAMIDOL 75 ML: 755 INJECTION, SOLUTION INTRAVENOUS at 11:49

## 2021-01-26 ENCOUNTER — HOSPITAL ENCOUNTER (OUTPATIENT)
Dept: CARDIOLOGY | Facility: HOSPITAL | Age: 66
Discharge: HOME | End: 2021-01-26
Admitting: PSYCHIATRY & NEUROLOGY

## 2021-01-26 DIAGNOSIS — I69.30 SEQUELAE, POST-STROKE: Primary | ICD-10-CM

## 2021-01-26 DIAGNOSIS — I65.23 BILATERAL CAROTID ARTERY STENOSIS: ICD-10-CM

## 2021-01-26 DIAGNOSIS — I65.21 STENOSIS OF RIGHT CAROTID ARTERY: ICD-10-CM

## 2021-01-26 DIAGNOSIS — I69.30 SEQUELAE, POST-STROKE: ICD-10-CM

## 2021-01-26 LAB
BH CV ECHO MEAS - BSA(HAYCOCK): 1.8 M^2
BH CV ECHO MEAS - BSA: 1.8 M^2
BH CV ECHO MEAS - BZI_BMI: 21.1 KILOGRAMS/M^2
BH CV ECHO MEAS - BZI_METRIC_HEIGHT: 177.8 CM
BH CV ECHO MEAS - BZI_METRIC_WEIGHT: 66.7 KG
BH CV XLRA MEAS LEFT DIST CCA EDV: 27 CM/SEC
BH CV XLRA MEAS LEFT DIST CCA PSV: 97.3 CM/SEC
BH CV XLRA MEAS LEFT MID CCA EDV: 36.7 CM/SEC
BH CV XLRA MEAS LEFT MID CCA PSV: 130 CM/SEC
BH CV XLRA MEAS LEFT PROX CCA EDV: 275 CM/SEC
BH CV XLRA MEAS LEFT PROX CCA PSV: 647 CM/SEC
BH CV XLRA MEAS LEFT PROX ECA EDV: 49.5 CM/SEC
BH CV XLRA MEAS LEFT PROX ECA PSV: 131 CM/SEC
BH CV XLRA MEAS LEFT PROX SCLA EDV: 22.4 CM/SEC
BH CV XLRA MEAS LEFT PROX SCLA PSV: 285 CM/SEC
BH CV XLRA MEAS RIGHT CCA RATIO VEL: 123 CM/SEC
BH CV XLRA MEAS RIGHT DIST CCA EDV: 85.4 CM/SEC
BH CV XLRA MEAS RIGHT DIST CCA PSV: 178 CM/SEC
BH CV XLRA MEAS RIGHT DIST ICA EDV: 69.1 CM/SEC
BH CV XLRA MEAS RIGHT DIST ICA PSV: 147 CM/SEC
BH CV XLRA MEAS RIGHT ICA RATIO VEL: 541 CM/SEC
BH CV XLRA MEAS RIGHT ICA/CCA RATIO: 4.4
BH CV XLRA MEAS RIGHT MID CCA EDV: 58.9 CM/SEC
BH CV XLRA MEAS RIGHT MID CCA PSV: 123 CM/SEC
BH CV XLRA MEAS RIGHT MID ICA EDV: 267 CM/SEC
BH CV XLRA MEAS RIGHT MID ICA PSV: 541 CM/SEC
BH CV XLRA MEAS RIGHT PROX CCA EDV: 87.7 CM/SEC
BH CV XLRA MEAS RIGHT PROX CCA PSV: 192 CM/SEC
BH CV XLRA MEAS RIGHT PROX ECA EDV: 218 CM/SEC
BH CV XLRA MEAS RIGHT PROX ECA PSV: 527 CM/SEC
BH CV XLRA MEAS RIGHT PROX ICA EDV: 200 CM/SEC
BH CV XLRA MEAS RIGHT PROX ICA PSV: 373 CM/SEC
BH CV XLRA MEAS RIGHT PROX SCLA EDV: 44.9 CM/SEC
BH CV XLRA MEAS RIGHT PROX SCLA PSV: 245 CM/SEC
BH CV XLRA MEAS RIGHT VERTEBRAL A EDV: 40.5 CM/SEC
BH CV XLRA MEAS RIGHT VERTEBRAL A PSV: 83.3 CM/SEC

## 2021-01-26 PROCEDURE — 93880 EXTRACRANIAL BILAT STUDY: CPT | Performed by: INTERNAL MEDICINE

## 2021-01-26 PROCEDURE — 93880 EXTRACRANIAL BILAT STUDY: CPT

## 2021-01-27 ENCOUNTER — TELEPHONE (OUTPATIENT)
Dept: NEUROLOGY | Facility: CLINIC | Age: 66
End: 2021-01-27

## 2021-01-27 NOTE — TELEPHONE ENCOUNTER
JOHN MCRAE  [phone]    THE PT'S FIHAL CALLED IN TODAY BECAUSE SHE IS WANTING TO SPEAK TO DR SILVER REGARDING THE PT'S ULTRA SOUNDS RESULTS. PLEASE GIVE HER A CALL BACK WHEN POSSIBLE.

## 2021-02-10 ENCOUNTER — OFFICE VISIT (OUTPATIENT)
Dept: NEUROSURGERY | Facility: CLINIC | Age: 66
End: 2021-02-10

## 2021-02-10 ENCOUNTER — OFFICE VISIT (OUTPATIENT)
Dept: NEUROLOGY | Facility: CLINIC | Age: 66
End: 2021-02-10

## 2021-02-10 VITALS
OXYGEN SATURATION: 99 % | BODY MASS INDEX: 27.03 KG/M2 | WEIGHT: 188.8 LBS | TEMPERATURE: 98.9 F | SYSTOLIC BLOOD PRESSURE: 126 MMHG | DIASTOLIC BLOOD PRESSURE: 70 MMHG | HEART RATE: 73 BPM | HEIGHT: 70 IN

## 2021-02-10 VITALS
WEIGHT: 189 LBS | HEIGHT: 70 IN | SYSTOLIC BLOOD PRESSURE: 118 MMHG | TEMPERATURE: 97.8 F | DIASTOLIC BLOOD PRESSURE: 60 MMHG | BODY MASS INDEX: 27.06 KG/M2

## 2021-02-10 DIAGNOSIS — I65.21 CAROTID STENOSIS, ASYMPTOMATIC, RIGHT: Primary | ICD-10-CM

## 2021-02-10 DIAGNOSIS — I65.21 STENOSIS OF RIGHT CAROTID ARTERY: ICD-10-CM

## 2021-02-10 DIAGNOSIS — I69.30 SEQUELAE, POST-STROKE: Primary | ICD-10-CM

## 2021-02-10 PROCEDURE — 99214 OFFICE O/P EST MOD 30 MIN: CPT | Performed by: RADIOLOGY

## 2021-02-10 PROCEDURE — 99214 OFFICE O/P EST MOD 30 MIN: CPT | Performed by: PSYCHIATRY & NEUROLOGY

## 2021-02-10 NOTE — PROGRESS NOTES
NAME: SHAINA PICHARDO   DOS: 2/10/2021  : 1955  PCP: Ernie Hsieh MD    Chief Complaint:    Chief Complaint   Patient presents with   • Carotid Artery Disease       History of Present Illness:  65 y.o. male who presented to [facility] in 2020 with left MCA stroke, presenting with some alteration of speech and facial droop.  He has made a significant recovery since that time, and is essentially back to his neurologic baseline.  He does have a history of laryngeal cancer and is status post prior surgery/radiation therapy.  He utilizes a vocal assist device.  CTA and catheter angiogram in 2020 (compared to prior study from , demonstrated interval occlusion of the left internal carotid artery, accounting for his stroke.  While there was some progression of disease involving his right carotid bifurcation, this was a borderline hemodynamically significant lesion, and thus he was treated with aggressive medical management.  He is being evaluated for paroxysmal atrial fibrillation, and is currently on an Eliquis/aspirin anticoagulation/antiplatelet regimen.    He presents today for routine follow-up.  He suffered no new or recurrent stroke or TIA-like symptoms.  I am seeing him in consultation regarding his asymptomatic right carotid disease.    Past Medical History:  Past Medical History:   Diagnosis Date   • Disease of thyroid gland    • Hyperlipidemia    • Hypertension    • Laryngeal cancer (CMS/HCC)        Past Surgical History:  Past Surgical History:   Procedure Laterality Date   • INTERVENTIONAL RADIOLOGY PROCEDURE N/A 2020    Procedure: IR MECHANICAL THROMBECTOMY - PRIMARY;  Surgeon: Aidan Plasencia MD;  Location: [facility] INVASIVE LOCATION;  Service: Interventional Radiology;  Laterality: N/A;   • MANDIBLE SURGERY     • TRACHEOSTOMY         Review of Systems:        Review of Systems   Neurological: Positive for speech difficulty.   All other  systems reviewed and are negative.       Medications    Current Outpatient Medications:   •  amLODIPine (NORVASC) 5 MG tablet, Take 5 mg by mouth Daily., Disp: , Rfl:   •  apixaban (ELIQUIS) 5 MG tablet tablet, Take 1 tablet by mouth Every 12 (Twelve) Hours. Indications: stroke, Disp: 60 tablet, Rfl: 3  •  aspirin 81 MG EC tablet, Take 1 tablet by mouth Daily., Disp: 30 tablet, Rfl: 3  •  atorvastatin (LIPITOR) 40 MG tablet, Take 2 tablets by mouth Every Night., Disp: 30 tablet, Rfl: 3  •  bimatoprost (LUMIGAN) 0.01 % ophthalmic drops, Administer 1 drop into the left eye Every Night., Disp: , Rfl:   •  brimonidine-timolol (COMBIGAN) 0.2-0.5 % ophthalmic solution, Every 12 (Twelve) Hours., Disp: , Rfl:   •  dorzolamide-timolol (COSOPT) 22.3-6.8 MG/ML ophthalmic solution, Administer 1 drop into the left eye 2 (Two) Times a Day., Disp: , Rfl:   •  levothyroxine (SYNTHROID, LEVOTHROID) 137 MCG tablet, Take 137 mcg by mouth Daily., Disp: , Rfl:     Allergies:  No Known Allergies    Social Hx:  Social History     Tobacco Use   • Smoking status: Former Smoker   Substance Use Topics   • Alcohol use: Not Currently   • Drug use: Never       Family Hx:  No family history on file.    Review of Imaging:  CT angiogram of the neck dated 1/25/2021 from [facility] was reviewed along with its corresponding radiologic report.  Comparison is made to multiple prior studies at [facility], to include the catheter angiogram from September 2020.  Additionally, carotid duplex dated 1/26/2021 was reviewed, along with its corresponding radiologic report.  Again seen is chronic occlusion of the cervical left internal carotid artery.  There is discordant data in regards to the severity of the right carotid stenosis.  The CT angiogram suggest this is a 50-60% lesion, with the carotid duplex (peak velocities 541/267 cm/s, ratio 4.4) suggesting that this is a hemodynamically significant (greater than 80%)  lesion.    Physical Examination:  Vitals:    02/10/21 1320   BP: 118/60   Temp: 97.8 °F (36.6 °C)        General Appearance:   Well developed, well nourished, well groomed, alert, and cooperative.  Cardiovascular: Regular rate and rhythm.  Bilateral carotid bruits (right greater than left)      Neurological examination:  Neurologic Exam     Mental Status   Oriented to person, place, and time.   Speech: (Uses a vocal assist device secondary to his laryngectomy.)  Level of consciousness: alert    Cranial Nerves   Cranial nerves II through XII intact.     Motor Exam   Muscle bulk: normal    Sensory Exam   Light touch normal.     Gait, Coordination, and Reflexes     Gait  Gait: normal      Diagnoses/Plan:    Mr. Shay is a 65 y.o. male who suffered a left hemispheric stroke in September 2020, attributable to a left internal carotid artery occlusion.  He has an indeterminate severity right carotid stenosis, with discordant data in regards to the hemodynamic significance of the lesion.  In reviewing his catheter angiograms from 2020 in 2013, there certainly has been some progression of disease involving the right internal carotid artery origin.  I do tend to believe this is a hemodynamically significant/high-grade (greater than 70%) lesion involving the right internal carotid artery origin, and certainly the elevated velocities on duplex would support this.  Given the contralateral carotid occlusion, the patient/family are concerned about the right carotid stenosis.  They are leaning towards pursuing angioplasty/stent placement (high CEA risk given radiation and contralateral carotid occlusion), and I think this is certainly reasonable.  He is due to follow-up with cardiology in the next a week or so for results regarding his Holter monitor, but assuming this is negative, we will transition him from an Eliquis/aspirin to Plavix/aspirin regimen and schedule him for angioplasty/stent placement.  I am going to have a  phone follow-up with him in 2 weeks time, at which point we will further discuss changing his medical regimen and scheduling his carotid procedure.  During the interim, he will contact our office in/or call 911 if he has any recurrent stroke or TIA-like symptoms.

## 2021-02-10 NOTE — PROGRESS NOTES
Subjective:    CC: Patrick Shay is seen today  for Stroke       HPI:  Current visit-since the last visit patient denies having any new strokelike symptoms.  He continues to take aspirin along with Eliquis and Lipitor 80 mg.  He had his carotid Doppler which showed right ICA stenosis of over 70% and severe proximal left CCA stenosis with total occlusion of left ICA.  He also had a CT angiogram of the head and neck that continues to show 50% right ICA stenosis as well as occluded left CCA and proximal left ICA along its entire cervical segment, with reconstitution of the petrous segment.  The previously seen distal left MCA stenosis was not noted at this time.  He also had a Holter monitor the results of which are still pending.  His blood pressure is much better after he cut back on his amlodipine.  Most readings were between 1 10-1 30.    Initial [id number] male accompanied by his girlfriend with a history of hypertension, hyperlipidemia, laryngeal cancer status post tracheostomy in 1998 (uses a voice assist device), legally blind in left eye due to eye injury presents as a hospital follow-up for stroke.  Patient states that he had difficulty getting his words out on 9/29.  He was brought to the hospital where an initial CT scan of the head edema in the left frontal region. After that he had a CT angiogram of the head which showed 50 to 60% of the left cavernous carotid stenosis and superior MCA division occlusion and a CT angiogram of the neck that showed near occlusion of the mid left common carotid artery as well as possible occlusion of the left ICA from the level of the mid left ICA to the proximal petrous segment.  Also 50 to 75% stenosis of the right carotid bulb and narrowing of right ICA to between 50 to 60% noted as well as moderate right mid vertebral stenosis.  CT perfusion showed focal ischemia in the left frontotemporal region.  MRI brain showed an area of restricted diffusion in the left  frontotemporal region including the left basal ganglia and insular cortex.  There was some question of whether the patient was taking Xarelto however patient denies that and states that his mother was on Xarelto not him.  He does not have a known history of atrial fibrillation.  Was started on Eliquis 5 mg twice a day (for presumable cardio embolic source) and aspirin 81 mg daily in addition to Lipitor 80 mg daily.  His lipid panel was as follows-, TG 96, LDL 65, HDL 37.  A1c was 5.6.  He states that his speech problems have now subsided.  Of note-I personally reviewed his CT angiogram of the head and neck and his MRI brain as well as 's note    The following portions of the patient's history were reviewed today and updated as of 12/15/2020  : allergies, current medications, past family history, past medical history, past social history, past surgical history and problem list  These document will be scanned to patient's chart.      Current Outpatient Medications:   •  amLODIPine (NORVASC) 5 MG tablet, Take 5 mg by mouth Daily., Disp: , Rfl:   •  apixaban (ELIQUIS) 5 MG tablet tablet, Take 1 tablet by mouth Every 12 (Twelve) Hours. Indications: stroke, Disp: 60 tablet, Rfl: 3  •  aspirin 81 MG EC tablet, Take 1 tablet by mouth Daily., Disp: 30 tablet, Rfl: 3  •  atorvastatin (LIPITOR) 40 MG tablet, Take 2 tablets by mouth Every Night., Disp: 30 tablet, Rfl: 3  •  bimatoprost (LUMIGAN) 0.01 % ophthalmic drops, Administer 1 drop into the left eye Every Night., Disp: , Rfl:   •  brimonidine-timolol (COMBIGAN) 0.2-0.5 % ophthalmic solution, Every 12 (Twelve) Hours., Disp: , Rfl:   •  dorzolamide-timolol (COSOPT) 22.3-6.8 MG/ML ophthalmic solution, Administer 1 drop into the left eye 2 (Two) Times a Day., Disp: , Rfl:   •  levothyroxine (SYNTHROID, LEVOTHROID) 137 MCG tablet, Take 137 mcg by mouth Daily., Disp: , Rfl:    Past Medical History:   Diagnosis Date   • Disease of thyroid gland    •  "Hyperlipidemia    • Hypertension    • Laryngeal cancer (CMS/HCC)       Past Surgical History:   Procedure Laterality Date   • INTERVENTIONAL RADIOLOGY PROCEDURE N/A 9/29/2020    Procedure: IR MECHANICAL THROMBECTOMY - PRIMARY;  Surgeon: Aidan Plasencia MD;  Location: [facility] INVASIVE LOCATION;  Service: Interventional Radiology;  Laterality: N/A;   • MANDIBLE SURGERY     • TRACHEOSTOMY        History reviewed. No pertinent family history.   Social History     Socioeconomic History   • Marital status:      Spouse name: Not on file   • Number of children: Not on file   • Years of education: Not on file   • Highest education level: Not on file   Tobacco Use   • Smoking status: Former Smoker   Substance and Sexual Activity   • Alcohol use: Not Currently   • Drug use: Never     Review of Systems   Neurological: Positive for speech difficulty.   All other systems reviewed and are negative.      Objective:    /70   Pulse 73   Temp 98.9 °F (37.2 °C)   Ht 177.8 cm (70\")   Wt 85.6 kg (188 lb 12.8 oz)   SpO2 99%   BMI 27.09 kg/m²     Neurology Exam:    General apperance: NAD.     Mental status: Alert, awake and oriented to time place and person.    Recent and Remote memory: Intact.    Attention span and Concentration: Normal.     Language and Speech: Intact- moderate dysarthria.  Using a voice assist device to speak    Fluency, Naming , Repitition and Comprehension:  Intact    Cranial Nerves:   CN II: Visual fields Intact in right eye.  He is legally blind in left eye.  Pupils - CHANTEL  CN III, IV and VI: Extraocular movements are intact in right eye.  He could not AB duct his left eye.    CN V: Facial sensation is intact.   CN VII: Muscles of facial expression reveal no asymmetry. Intact.   CN VIII: Hearing is intact. Whispered voice intact.   CN IX and X: Palate elevates symmetrically. Intact  CN XI: Shoulder shrug is intact.   CN XII: Tongue is midline without evidence of atrophy or " fasciculation.     Ophthalmoscopic exam of optic disc-poorly visualized    Motor:  Right UE muscle strength 5/5. Normal tone.     Left UE muscle strength 5/5. Normal tone.      Right LE muscle strength5/5. Normal tone.     Left LE muscle strength 5/5. Normal tone.      Sensory: Normal light touch, vibration and pinprick sensation bilaterally.    DTRs: 2+ bilaterally in upper and lower extremities.    Babinski: Negative bilaterally.    Co-ordination: Normal finger-to-nose, heel to shin B/L.    Rhomberg: Negative.    Gait: Normal.    Cardiovascular: Regular rate and rhythm without murmur, gallop or rub.    Assessment and Plan:  1. Sequelae, post-stroke  Patient had a left MCA stroke which could have been due to artery to artery embolus secondary to left ICA occlusion (acute versus chronic) with emboli to the superior branch of left MCA versus a cardioembolic phenomenon  Repeat CT angiogram of the head and neck looks about the same other than improvement in the distal left MCA occlusion however his carotid Doppler shows over 70% right ICA stenosis.  -Patient has an appointment with Dr. Covarrubias today to see if he would be a candidate for either stent or CEA on the right  -For now he can continue aspirin 81 mg along with Eliquis 5 mg twice a day day.  Holter monitor results are still pending.  If negative then I may stop Eliquis and switch him to dual antiplatelets  -Also continue Lipitor 80 mg daily for goal LDL of less than 100.  His LDL was 65  -Goal blood pressure less than 130/90.  On some days patient's blood pressure is extremely low and I have told him to hold his dose of amlodipine if systolic blood pressure less than 100       Return in about 3 months (around 5/10/2021).       Tyra Barakat MD

## 2021-02-22 ENCOUNTER — PREP FOR SURGERY (OUTPATIENT)
Dept: OTHER | Facility: HOSPITAL | Age: 66
End: 2021-02-22

## 2021-02-22 ENCOUNTER — OFFICE VISIT (OUTPATIENT)
Dept: NEUROSURGERY | Facility: CLINIC | Age: 66
End: 2021-02-22

## 2021-02-22 VITALS — HEIGHT: 70 IN | TEMPERATURE: 97.8 F | WEIGHT: 189 LBS | BODY MASS INDEX: 27.06 KG/M2

## 2021-02-22 DIAGNOSIS — I65.21 CAROTID STENOSIS, ASYMPTOMATIC, RIGHT: Primary | ICD-10-CM

## 2021-02-22 PROCEDURE — 99442 PR PHYS/QHP TELEPHONE EVALUATION 11-20 MIN: CPT | Performed by: RADIOLOGY

## 2021-02-22 RX ORDER — SODIUM CHLORIDE 0.9 % (FLUSH) 0.9 %
1-10 SYRINGE (ML) INJECTION AS NEEDED
Status: CANCELLED | OUTPATIENT
Start: 2021-02-22

## 2021-02-22 RX ORDER — SODIUM CHLORIDE 0.9 % (FLUSH) 0.9 %
3 SYRINGE (ML) INJECTION EVERY 12 HOURS SCHEDULED
Status: CANCELLED | OUTPATIENT
Start: 2021-02-22

## 2021-02-22 RX ORDER — SODIUM CHLORIDE 9 MG/ML
50 INJECTION, SOLUTION INTRAVENOUS CONTINUOUS
Status: CANCELLED | OUTPATIENT
Start: 2021-02-22

## 2021-02-22 NOTE — PROGRESS NOTES
NAME: SHAINA PICHARDO   DOS: 2021  : 1955  PCP: Ernie Hsieh MD    Chief Complaint:    Chief Complaint   Patient presents with   • Carotid Artery Disease     You have chosen to receive care through a telephone visit. Do you consent to use a telephone visit for your medical care today? Yes    History of Present Illness:  65 y.o. male who presented to [facility] in 2020 with left MCA stroke, presenting with some alteration of speech and facial droop.  He has made a significant recovery since that time, and is essentially back to his neurologic baseline.  He does have a history of laryngeal cancer and is status post prior surgery/radiation therapy.  He utilizes a vocal assist device.  CTA and catheter angiogram in 2020 (compared to prior study from , demonstrated interval occlusion of the left internal carotid artery, accounting for his stroke.  While there was some progression of disease involving his right carotid bifurcation, this was a borderline hemodynamically significant lesion, and thus he was treated with aggressive medical management.  He is being evaluated for paroxysmal atrial fibrillation, and is currently on an Eliquis/aspirin anticoagulation/antiplatelet regimen.     I recently saw him in the neuro interventional clinic, and he had discordant data in regards to the severity of his right carotid disease, with carotid duplexes suggesting progression and a now high-grade stenosis.  He was wearing a Holter monitor to assess for atrial fibrillation, but this did not provide any data, and he is scheduled to have another monitor placed in the short interim.  He continues to do well, denying any new stroke-like symptoms.      Past Medical History:  Past Medical History:   Diagnosis Date   • Disease of thyroid gland    • Hyperlipidemia    • Hypertension    • Laryngeal cancer (CMS/HCC)        Past Surgical History:  Past Surgical History:   Procedure  Laterality Date   • INTERVENTIONAL RADIOLOGY PROCEDURE N/A 9/29/2020    Procedure: IR MECHANICAL THROMBECTOMY - PRIMARY;  Surgeon: Aidan Plasencia MD;  Location: [facility] INVASIVE LOCATION;  Service: Interventional Radiology;  Laterality: N/A;   • MANDIBLE SURGERY     • TRACHEOSTOMY         Review of Systems:        Review of Systems   Neurological: Positive for speech difficulty.   All other systems reviewed and are negative.       Medications    Current Outpatient Medications:   •  amLODIPine (NORVASC) 5 MG tablet, Take 5 mg by mouth Daily., Disp: , Rfl:   •  apixaban (ELIQUIS) 5 MG tablet tablet, Take 1 tablet by mouth Every 12 (Twelve) Hours. Indications: stroke, Disp: 60 tablet, Rfl: 3  •  aspirin 81 MG EC tablet, Take 1 tablet by mouth Daily., Disp: 30 tablet, Rfl: 3  •  atorvastatin (LIPITOR) 40 MG tablet, Take 2 tablets by mouth Every Night., Disp: 30 tablet, Rfl: 3  •  bimatoprost (LUMIGAN) 0.01 % ophthalmic drops, Administer 1 drop into the left eye Every Night., Disp: , Rfl:   •  brimonidine-timolol (COMBIGAN) 0.2-0.5 % ophthalmic solution, Every 12 (Twelve) Hours., Disp: , Rfl:   •  dorzolamide-timolol (COSOPT) 22.3-6.8 MG/ML ophthalmic solution, Administer 1 drop into the left eye 2 (Two) Times a Day., Disp: , Rfl:   •  levothyroxine (SYNTHROID, LEVOTHROID) 137 MCG tablet, Take 137 mcg by mouth Daily., Disp: , Rfl:     Allergies:  No Known Allergies    Social Hx:  Social History     Tobacco Use   • Smoking status: Former Smoker   Substance Use Topics   • Alcohol use: Not Currently   • Drug use: Never       Family Hx:  No family history on file.    Review of Imaging:  No new imaging.      CT angiogram of the neck dated 1/25/2021 from [facility] was reviewed along with its corresponding radiologic report.  Comparison is made to multiple prior studies at [facility], to include the catheter angiogram from September 2020.  Additionally, carotid duplex dated 1/26/2021  "was reviewed, along with its corresponding radiologic report.  Again seen is chronic occlusion of the cervical left internal carotid artery.  There is discordant data in regards to the severity of the right carotid stenosis.  The CT angiogram suggest this is a 50-60% lesion, with the carotid duplex (peak velocities 541/267 cm/s, ratio 4.4) suggesting that this is a hemodynamically significant (greater than 80%) lesion.    Physical Examination:  This visit is being performed via \"telephone\" visit given the current coronavirus pandemic.  Mr. Cohen denies any new stroke or TIA-like symptoms.  He again uses a vocal assist devices secondary to prior laryngectomy, but he is not aphasic.  Denies any new lateral weakness.      Diagnoses/Plan:    Mr. Shay is a 65 y.o. male who suffered a left hemispheric stroke in September 2020, attributable to a left internal carotid artery occlusion.  He has an indeterminate severity right carotid stenosis, with discordant data in regards to the hemodynamic significance of the lesion.  Nonetheless, carotid duplex demonstrates peak velocities of 541/267 cm/s involving his right internal carotid, very concerning for progression of disease and hemodynamically significant lesion.  He is still awaiting results of his Holter monitor, and this will likely take another 30 days or so.  He is also scheduled to have the second dose of his a coronavirus vaccine in a few weeks, and is tentatively scheduled to have dilatation of his airway in early April.  I gave him the option of having diagnostic angiogram with probable right carotid stent placement (high CEA risk) in the next a week or so, versus awaiting until late April/early May (when his other medical issues have been addressed).  At this point, he is leaning towards going ahead and getting the angiogram with probable stent, and I will have my office contact him tomorrow to schedule his procedure.  We will likely transition him to an " Eliquis/Plavix regimen the day of procedure.    Approximately 13 minutes was utilized during this telephone visit, to include (but not limited to): Discussing the risks/benefits of the procedure, discussing the procedure in detail, coordinating care with his other medical conditions.

## 2021-02-24 ENCOUNTER — TELEPHONE (OUTPATIENT)
Dept: NEUROSURGERY | Facility: CLINIC | Age: 66
End: 2021-02-24

## 2021-02-24 NOTE — TELEPHONE ENCOUNTER
Caller: JOHN    Relationship to patient: SPOUSE    Best call back number: [phone]    Patient is needing: PATIENTS SPOUSE CALLING WANTING TO SCHEDULE PROCEDURE PER LAST OFFICE NOTE 2-22-21 WITH DR. MCCALL. PATIENT WAS TOLD HE WOULD BE HEARING SOMETHING Tuesday 2-23-21 AND HAS YET TO RECEIVE A CALL. PLEASE ADVISE.

## 2021-02-26 NOTE — TELEPHONE ENCOUNTER
Spoke with the patient.  He'd like to have his angiogram in April. I told Penelope Laurita that I'll call  Him to schedule this once I have the April calendar.

## 2021-03-22 ENCOUNTER — TELEPHONE (OUTPATIENT)
Dept: NEUROLOGY | Facility: CLINIC | Age: 66
End: 2021-03-22

## 2021-03-22 NOTE — TELEPHONE ENCOUNTER
----- Message from Tyra Barakat MD sent at 3/22/2021  1:48 PM EDT -----  Please let the patient know that his Holter monitor was abnormal.  He should continue Eliquis for now

## 2021-03-25 ENCOUNTER — TELEPHONE (OUTPATIENT)
Dept: NEUROSURGERY | Facility: CLINIC | Age: 66
End: 2021-03-25

## 2021-03-25 NOTE — TELEPHONE ENCOUNTER
Caller:  JOHN    Relationship to patient: SPOUSE ([facility] IN CHART)    Best call back number: [phone]    Chief complaint:     Type of visit:     Requested date:     If rescheduling, when is the original appointment:      Additional notes: JOHN CALLED AND STATED THEY WERE WAITING ON A CALL BACK FROM [facility] TO SCHEDULE PTS PROCEDURE.  THEY ARE WAITING ON THE April SCHEDULE TO OPEN.  CALLER FORGOT TO ASK IF THE PT SHAINA PICHARDO NEEDED TO SEE DR. MCCALL BEFORE HIS PROCEDURE AND IF SO WANTED TO MAKE THE APPT.    PLEASE CONTACT PT.  THANK YOU.

## 2021-04-01 PROBLEM — I65.21 CAROTID STENOSIS, ASYMPTOMATIC, RIGHT: Status: ACTIVE | Noted: 2021-04-01

## 2021-04-11 ENCOUNTER — APPOINTMENT (OUTPATIENT)
Dept: PREADMISSION TESTING | Facility: HOSPITAL | Age: 66
End: 2021-04-11

## 2021-04-11 LAB — SARS-COV-2 RNA NOSE QL NAA+PROBE: NOT DETECTED

## 2021-04-11 PROCEDURE — U0004 COV-19 TEST NON-CDC HGH THRU: HCPCS

## 2021-04-11 PROCEDURE — C9803 HOPD COVID-19 SPEC COLLECT: HCPCS

## 2021-04-21 ENCOUNTER — APPOINTMENT (OUTPATIENT)
Dept: PREADMISSION TESTING | Facility: HOSPITAL | Age: 66
End: 2021-04-21

## 2021-04-21 LAB — SARS-COV-2 RNA PNL SPEC NAA+PROBE: NOT DETECTED

## 2021-04-21 PROCEDURE — U0004 COV-19 TEST NON-CDC HGH THRU: HCPCS

## 2021-04-21 PROCEDURE — C9803 HOPD COVID-19 SPEC COLLECT: HCPCS

## 2021-04-23 ENCOUNTER — APPOINTMENT (OUTPATIENT)
Dept: CARDIOLOGY | Facility: HOSPITAL | Age: 66
End: 2021-04-23

## 2021-04-23 ENCOUNTER — HOSPITAL ENCOUNTER (INPATIENT)
Facility: HOSPITAL | Age: 66
LOS: 1 days | Discharge: HOME | End: 2021-04-24
Attending: RADIOLOGY | Admitting: RADIOLOGY

## 2021-04-23 DIAGNOSIS — I65.21 CAROTID STENOSIS, ASYMPTOMATIC, RIGHT: ICD-10-CM

## 2021-04-23 LAB
ANION GAP SERPL CALCULATED.3IONS-SCNC: 10 MMOL/L (ref 5–15)
BH CV DISTAL RIGHT ICA HIDDEN LRR: 1 CM
BH CV ECHO MEAS - BSA(HAYCOCK): 2 M^2
BH CV ECHO MEAS - BSA: 1.9 M^2
BH CV ECHO MEAS - BZI_BMI: 25.4 KILOGRAMS/M^2
BH CV ECHO MEAS - BZI_METRIC_HEIGHT: 175.3 CM
BH CV ECHO MEAS - BZI_METRIC_WEIGHT: 78 KG
BH CV MID RIGHT ICA HIDDEN LRR: 1 CM
BH CV RIGHT CCA HIDDEN LRR: 1 CM/S
BH CV VAS CAROTID RIGHT DISTAL STENT EDV: 61 CM/S
BH CV VAS CAROTID RIGHT DISTAL STENT PSV: 174 CM/S
BH CV VAS CAROTID RIGHT DISTAL TO STENT NATIVE VESSEL E: 69 CM/S
BH CV VAS CAROTID RIGHT DISTAL TO STENT PSV: 168 CM/S
BH CV VAS CAROTID RIGHT MID STENT EDV: 69 CM/S
BH CV VAS CAROTID RIGHT MID STENT PSV: 215 CM/S
BH CV VAS CAROTID RIGHT PROXIMAL STENT EDV: 56 CM/S
BH CV VAS CAROTID RIGHT PROXIMAL STENT PSV: 205 CM/S
BH CV VAS CAROTID RIGHT STENT NATIVE VESSEL PROXIMAL EDV: 78 CM/S
BH CV VAS CAROTID RIGHT STENT NATIVE VESSEL PROXIMAL PS: 234 CM/S
BH CV XLRA MEAS RIGHT MID CCA EDV: 78.4 CM/SEC
BH CV XLRA MEAS RIGHT MID CCA PSV: 234 CM/SEC
BH CV XLRA MEAS RIGHT PROX CCA EDV: 70.6 CM/SEC
BH CV XLRA MEAS RIGHT PROX CCA PSV: 234 CM/SEC
BH CV XLRA MEAS RIGHT PROX ECA EDV: 128 CM/SEC
BH CV XLRA MEAS RIGHT PROX ECA PSV: 494 CM/SEC
BH CV XLRA MEAS RIGHT PROX SCLA EDV: 20.4 CM/SEC
BH CV XLRA MEAS RIGHT PROX SCLA PSV: 221 CM/SEC
BH CV XLRA MEAS RIGHT VERTEBRAL A EDV: 35.8 CM/SEC
BH CV XLRA MEAS RIGHT VERTEBRAL A PSV: 90.1 CM/SEC
BH CVPROX RIGHT ICA HIDDEN LRR: 1 CM
BUN SERPL-MCNC: 12 MG/DL (ref 8–23)
BUN/CREAT SERPL: 10.2 (ref 7–25)
CALCIUM SPEC-SCNC: 8.8 MG/DL (ref 8.6–10.5)
CHLORIDE SERPL-SCNC: 102 MMOL/L (ref 98–107)
CO2 SERPL-SCNC: 26 MMOL/L (ref 22–29)
CREAT SERPL-MCNC: 1.18 MG/DL (ref 0.76–1.27)
DEPRECATED RDW RBC AUTO: 45.2 FL (ref 37–54)
ERYTHROCYTE [DISTWIDTH] IN BLOOD BY AUTOMATED COUNT: 14 % (ref 12.3–15.4)
GFR SERPL CREATININE-BSD FRML MDRD: 62 ML/MIN/1.73
GLUCOSE BLDC GLUCOMTR-MCNC: 105 MG/DL (ref 70–130)
GLUCOSE BLDC GLUCOMTR-MCNC: 128 MG/DL (ref 70–130)
GLUCOSE SERPL-MCNC: 101 MG/DL (ref 65–99)
HCT VFR BLD AUTO: 41.5 % (ref 37.5–51)
HGB BLD-MCNC: 13 G/DL (ref 13–17.7)
MCH RBC QN AUTO: 27.5 PG (ref 26.6–33)
MCHC RBC AUTO-ENTMCNC: 31.3 G/DL (ref 31.5–35.7)
MCV RBC AUTO: 87.9 FL (ref 79–97)
PLATELET # BLD AUTO: 180 10*3/MM3 (ref 140–450)
PMV BLD AUTO: 10.7 FL (ref 6–12)
POTASSIUM SERPL-SCNC: 3.9 MMOL/L (ref 3.5–5.2)
RBC # BLD AUTO: 4.72 10*6/MM3 (ref 4.14–5.8)
SODIUM SERPL-SCNC: 138 MMOL/L (ref 136–145)
WBC # BLD AUTO: 9.22 10*3/MM3 (ref 3.4–10.8)

## 2021-04-23 PROCEDURE — C1769 GUIDE WIRE: HCPCS | Performed by: RADIOLOGY

## 2021-04-23 PROCEDURE — 037K3DZ DILATION OF RIGHT INTERNAL CAROTID ARTERY WITH INTRALUMINAL DEVICE, PERCUTANEOUS APPROACH: ICD-10-PCS | Performed by: RADIOLOGY

## 2021-04-23 PROCEDURE — 99222 1ST HOSP IP/OBS MODERATE 55: CPT | Performed by: INTERNAL MEDICINE

## 2021-04-23 PROCEDURE — 93882 EXTRACRANIAL UNI/LTD STUDY: CPT

## 2021-04-23 PROCEDURE — 82565 ASSAY OF CREATININE: CPT

## 2021-04-23 PROCEDURE — 25010000002 FENTANYL CITRATE (PF) 100 MCG/2ML SOLUTION: Performed by: RADIOLOGY

## 2021-04-23 PROCEDURE — 36223 PLACE CATH CAROTID/INOM ART: CPT | Performed by: RADIOLOGY

## 2021-04-23 PROCEDURE — C1725 CATH, TRANSLUMIN NON-LASER: HCPCS | Performed by: RADIOLOGY

## 2021-04-23 PROCEDURE — C1760 CLOSURE DEV, VASC: HCPCS | Performed by: RADIOLOGY

## 2021-04-23 PROCEDURE — B31F1ZZ FLUOROSCOPY OF LEFT VERTEBRAL ARTERY USING LOW OSMOLAR CONTRAST: ICD-10-PCS | Performed by: RADIOLOGY

## 2021-04-23 PROCEDURE — B3141ZZ FLUOROSCOPY OF LEFT COMMON CAROTID ARTERY USING LOW OSMOLAR CONTRAST: ICD-10-PCS | Performed by: RADIOLOGY

## 2021-04-23 PROCEDURE — 80048 BASIC METABOLIC PNL TOTAL CA: CPT | Performed by: RADIOLOGY

## 2021-04-23 PROCEDURE — A9270 NON-COVERED ITEM OR SERVICE: HCPCS | Performed by: PHYSICIAN ASSISTANT

## 2021-04-23 PROCEDURE — 37215 TRANSCATH STENT CCA W/EPS: CPT | Performed by: RADIOLOGY

## 2021-04-23 PROCEDURE — C1884 EMBOLIZATION PROTECT SYST: HCPCS | Performed by: RADIOLOGY

## 2021-04-23 PROCEDURE — C1876 STENT, NON-COA/NON-COV W/DEL: HCPCS | Performed by: RADIOLOGY

## 2021-04-23 PROCEDURE — 63710000001 CLOPIDOGREL 75 MG TABLET: Performed by: PHYSICIAN ASSISTANT

## 2021-04-23 PROCEDURE — C1894 INTRO/SHEATH, NON-LASER: HCPCS | Performed by: RADIOLOGY

## 2021-04-23 PROCEDURE — 25010000002 MIDAZOLAM PER 1 MG: Performed by: RADIOLOGY

## 2021-04-23 PROCEDURE — 82962 GLUCOSE BLOOD TEST: CPT

## 2021-04-23 PROCEDURE — S0260 H&P FOR SURGERY: HCPCS | Performed by: PHYSICIAN ASSISTANT

## 2021-04-23 PROCEDURE — 36225 PLACE CATH SUBCLAVIAN ART: CPT | Performed by: RADIOLOGY

## 2021-04-23 PROCEDURE — 93882 EXTRACRANIAL UNI/LTD STUDY: CPT | Performed by: INTERNAL MEDICINE

## 2021-04-23 PROCEDURE — 0 IODIXANOL PER 1 ML: Performed by: RADIOLOGY

## 2021-04-23 PROCEDURE — 85027 COMPLETE CBC AUTOMATED: CPT | Performed by: RADIOLOGY

## 2021-04-23 PROCEDURE — 25010000002 HEPARIN (PORCINE) PER 1000 UNITS: Performed by: RADIOLOGY

## 2021-04-23 DEVICE — XACT CAROTID STENT SYSTEM 10.0 MM X 40 MM TAPERED
Type: IMPLANTABLE DEVICE | Status: FUNCTIONAL
Brand: XACT

## 2021-04-23 DEVICE — CLOSED CELL SELF-EXPANDING STENT
Type: IMPLANTABLE DEVICE | Status: FUNCTIONAL
Brand: CAROTID WALLSTENT®

## 2021-04-23 RX ORDER — ATORVASTATIN CALCIUM 40 MG/1
80 TABLET, FILM COATED ORAL NIGHTLY
Status: DISCONTINUED | OUTPATIENT
Start: 2021-04-23 | End: 2021-04-24 | Stop reason: HOSPADM

## 2021-04-23 RX ORDER — DORZOLAMIDE HCL 20 MG/ML
1 SOLUTION/ DROPS OPHTHALMIC EVERY 12 HOURS SCHEDULED
Status: DISCONTINUED | OUTPATIENT
Start: 2021-04-23 | End: 2021-04-24 | Stop reason: HOSPADM

## 2021-04-23 RX ORDER — SODIUM CHLORIDE 0.9 % (FLUSH) 0.9 %
3 SYRINGE (ML) INJECTION EVERY 12 HOURS SCHEDULED
Status: DISCONTINUED | OUTPATIENT
Start: 2021-04-23 | End: 2021-04-23

## 2021-04-23 RX ORDER — SODIUM CHLORIDE 9 MG/ML
75 INJECTION, SOLUTION INTRAVENOUS CONTINUOUS
Status: ACTIVE | OUTPATIENT
Start: 2021-04-23 | End: 2021-04-23

## 2021-04-23 RX ORDER — SODIUM CHLORIDE 0.9 % (FLUSH) 0.9 %
1-10 SYRINGE (ML) INJECTION AS NEEDED
Status: DISCONTINUED | OUTPATIENT
Start: 2021-04-23 | End: 2021-04-23

## 2021-04-23 RX ORDER — MIDAZOLAM HYDROCHLORIDE 1 MG/ML
INJECTION INTRAMUSCULAR; INTRAVENOUS AS NEEDED
Status: DISCONTINUED | OUTPATIENT
Start: 2021-04-23 | End: 2021-04-23 | Stop reason: HOSPADM

## 2021-04-23 RX ORDER — LATANOPROST 50 UG/ML
1 SOLUTION/ DROPS OPHTHALMIC NIGHTLY
Status: DISCONTINUED | OUTPATIENT
Start: 2021-04-23 | End: 2021-04-24 | Stop reason: HOSPADM

## 2021-04-23 RX ORDER — HEPARIN SODIUM 1000 [USP'U]/ML
INJECTION, SOLUTION INTRAVENOUS; SUBCUTANEOUS AS NEEDED
Status: DISCONTINUED | OUTPATIENT
Start: 2021-04-23 | End: 2021-04-23 | Stop reason: HOSPADM

## 2021-04-23 RX ORDER — SONIDEGIB 200 MG/1
200 CAPSULE ORAL DAILY
COMMUNITY
End: 2021-10-14

## 2021-04-23 RX ORDER — PHENYLEPHRINE HCL IN 0.9% NACL 0.5 MG/5ML
.5-3 SYRINGE (ML) INTRAVENOUS
Status: DISCONTINUED | OUTPATIENT
Start: 2021-04-23 | End: 2021-04-24 | Stop reason: HOSPADM

## 2021-04-23 RX ORDER — TIMOLOL MALEATE 5 MG/ML
1 SOLUTION/ DROPS OPHTHALMIC EVERY 12 HOURS SCHEDULED
Status: DISCONTINUED | OUTPATIENT
Start: 2021-04-23 | End: 2021-04-24 | Stop reason: HOSPADM

## 2021-04-23 RX ORDER — ACETAMINOPHEN 325 MG/1
650 TABLET ORAL EVERY 6 HOURS PRN
Status: DISCONTINUED | OUTPATIENT
Start: 2021-04-23 | End: 2021-04-24 | Stop reason: HOSPADM

## 2021-04-23 RX ORDER — CLOPIDOGREL BISULFATE 75 MG/1
75 TABLET ORAL DAILY
Status: DISCONTINUED | OUTPATIENT
Start: 2021-04-23 | End: 2021-04-24 | Stop reason: HOSPADM

## 2021-04-23 RX ORDER — BUPRENORPHINE HYDROCHLORIDE AND NALOXONE HYDROCHLORIDE DIHYDRATE 8; 2 MG/1; MG/1
2 TABLET SUBLINGUAL DAILY
Status: DISCONTINUED | OUTPATIENT
Start: 2021-04-23 | End: 2021-04-24 | Stop reason: HOSPADM

## 2021-04-23 RX ORDER — AMLODIPINE BESYLATE 5 MG/1
5 TABLET ORAL DAILY
Status: DISCONTINUED | OUTPATIENT
Start: 2021-04-23 | End: 2021-04-24 | Stop reason: HOSPADM

## 2021-04-23 RX ORDER — SODIUM CHLORIDE 0.9 % (FLUSH) 0.9 %
10 SYRINGE (ML) INJECTION AS NEEDED
Status: DISCONTINUED | OUTPATIENT
Start: 2021-04-23 | End: 2021-04-24 | Stop reason: HOSPADM

## 2021-04-23 RX ORDER — DORZOLAMIDE HYDROCHLORIDE AND TIMOLOL MALEATE 20; 5 MG/ML; MG/ML
SOLUTION/ DROPS OPHTHALMIC 2 TIMES DAILY
Status: DISCONTINUED | OUTPATIENT
Start: 2021-04-23 | End: 2021-04-23 | Stop reason: ALTCHOICE

## 2021-04-23 RX ORDER — SODIUM CHLORIDE 9 MG/ML
50 INJECTION, SOLUTION INTRAVENOUS CONTINUOUS
Status: DISCONTINUED | OUTPATIENT
Start: 2021-04-23 | End: 2021-04-24 | Stop reason: HOSPADM

## 2021-04-23 RX ORDER — SODIUM CHLORIDE 0.9 % (FLUSH) 0.9 %
3 SYRINGE (ML) INJECTION EVERY 12 HOURS SCHEDULED
Status: DISCONTINUED | OUTPATIENT
Start: 2021-04-23 | End: 2021-04-24 | Stop reason: HOSPADM

## 2021-04-23 RX ORDER — BUPRENORPHINE HYDROCHLORIDE AND NALOXONE HYDROCHLORIDE DIHYDRATE 8; 2 MG/1; MG/1
2 TABLET SUBLINGUAL DAILY
COMMUNITY

## 2021-04-23 RX ORDER — IODIXANOL 320 MG/ML
INJECTION, SOLUTION INTRAVASCULAR AS NEEDED
Status: DISCONTINUED | OUTPATIENT
Start: 2021-04-23 | End: 2021-04-23 | Stop reason: HOSPADM

## 2021-04-23 RX ORDER — FENTANYL CITRATE 50 UG/ML
INJECTION, SOLUTION INTRAMUSCULAR; INTRAVENOUS AS NEEDED
Status: DISCONTINUED | OUTPATIENT
Start: 2021-04-23 | End: 2021-04-23 | Stop reason: HOSPADM

## 2021-04-23 RX ORDER — CLOPIDOGREL BISULFATE 75 MG/1
300 TABLET ORAL ONCE
Status: COMPLETED | OUTPATIENT
Start: 2021-04-23 | End: 2021-04-23

## 2021-04-23 RX ORDER — LIDOCAINE HYDROCHLORIDE 10 MG/ML
INJECTION, SOLUTION EPIDURAL; INFILTRATION; INTRACAUDAL; PERINEURAL AS NEEDED
Status: DISCONTINUED | OUTPATIENT
Start: 2021-04-23 | End: 2021-04-23 | Stop reason: HOSPADM

## 2021-04-23 RX ORDER — ATORVASTATIN CALCIUM 80 MG/1
80 TABLET, FILM COATED ORAL DAILY
COMMUNITY
End: 2021-10-14

## 2021-04-23 RX ADMIN — TIMOLOL MALEATE 1 DROP: 5 SOLUTION/ DROPS OPHTHALMIC at 22:22

## 2021-04-23 RX ADMIN — ATORVASTATIN CALCIUM 80 MG: 40 TABLET, FILM COATED ORAL at 21:04

## 2021-04-23 RX ADMIN — APIXABAN 5 MG: 5 TABLET, FILM COATED ORAL at 21:05

## 2021-04-23 RX ADMIN — CLOPIDOGREL BISULFATE 300 MG: 75 TABLET ORAL at 08:39

## 2021-04-23 RX ADMIN — SODIUM CHLORIDE 50 ML/HR: 9 INJECTION, SOLUTION INTRAVENOUS at 07:16

## 2021-04-23 RX ADMIN — DORZOLAMIDE HYDROCHLORIDE 1 DROP: 20 SOLUTION/ DROPS OPHTHALMIC at 22:22

## 2021-04-23 RX ADMIN — BUPRENORPHINE AND NALOXONE 2 TABLET: 8; 2 TABLET SUBLINGUAL at 10:55

## 2021-04-23 RX ADMIN — ACETAMINOPHEN 650 MG: 325 TABLET ORAL at 18:25

## 2021-04-23 RX ADMIN — AMLODIPINE BESYLATE 5 MG: 5 TABLET ORAL at 10:55

## 2021-04-23 RX ADMIN — SODIUM CHLORIDE 75 ML/HR: 9 INJECTION, SOLUTION INTRAVENOUS at 10:54

## 2021-04-23 RX ADMIN — LATANOPROST 1 DROP: 50 SOLUTION OPHTHALMIC at 22:22

## 2021-04-23 RX ADMIN — CLOPIDOGREL BISULFATE 75 MG: 75 TABLET ORAL at 18:17

## 2021-04-24 VITALS
RESPIRATION RATE: 16 BRPM | DIASTOLIC BLOOD PRESSURE: 69 MMHG | TEMPERATURE: 97.7 F | HEART RATE: 76 BPM | OXYGEN SATURATION: 93 % | BODY MASS INDEX: 27.75 KG/M2 | HEIGHT: 67 IN | SYSTOLIC BLOOD PRESSURE: 111 MMHG | WEIGHT: 176.81 LBS

## 2021-04-24 LAB
ANION GAP SERPL CALCULATED.3IONS-SCNC: 9 MMOL/L (ref 5–15)
BASOPHILS # BLD AUTO: 0.07 10*3/MM3 (ref 0–0.2)
BASOPHILS NFR BLD AUTO: 0.7 % (ref 0–1.5)
BUN SERPL-MCNC: 11 MG/DL (ref 8–23)
BUN/CREAT SERPL: 8.9 (ref 7–25)
CALCIUM SPEC-SCNC: 8.6 MG/DL (ref 8.6–10.5)
CHLORIDE SERPL-SCNC: 106 MMOL/L (ref 98–107)
CO2 SERPL-SCNC: 25 MMOL/L (ref 22–29)
CREAT SERPL-MCNC: 1.24 MG/DL (ref 0.76–1.27)
DEPRECATED RDW RBC AUTO: 45.9 FL (ref 37–54)
EOSINOPHIL # BLD AUTO: 0.38 10*3/MM3 (ref 0–0.4)
EOSINOPHIL NFR BLD AUTO: 4 % (ref 0.3–6.2)
ERYTHROCYTE [DISTWIDTH] IN BLOOD BY AUTOMATED COUNT: 14.2 % (ref 12.3–15.4)
GFR SERPL CREATININE-BSD FRML MDRD: 58 ML/MIN/1.73
GLUCOSE SERPL-MCNC: 120 MG/DL (ref 65–99)
HCT VFR BLD AUTO: 39.3 % (ref 37.5–51)
HGB BLD-MCNC: 12.3 G/DL (ref 13–17.7)
IMM GRANULOCYTES # BLD AUTO: 0.04 10*3/MM3 (ref 0–0.05)
IMM GRANULOCYTES NFR BLD AUTO: 0.4 % (ref 0–0.5)
LYMPHOCYTES # BLD AUTO: 0.91 10*3/MM3 (ref 0.7–3.1)
LYMPHOCYTES NFR BLD AUTO: 9.7 % (ref 19.6–45.3)
MCH RBC QN AUTO: 27.8 PG (ref 26.6–33)
MCHC RBC AUTO-ENTMCNC: 31.3 G/DL (ref 31.5–35.7)
MCV RBC AUTO: 88.7 FL (ref 79–97)
MONOCYTES # BLD AUTO: 0.88 10*3/MM3 (ref 0.1–0.9)
MONOCYTES NFR BLD AUTO: 9.4 % (ref 5–12)
NEUTROPHILS NFR BLD AUTO: 7.11 10*3/MM3 (ref 1.7–7)
NEUTROPHILS NFR BLD AUTO: 75.8 % (ref 42.7–76)
NRBC BLD AUTO-RTO: 0 /100 WBC (ref 0–0.2)
PLATELET # BLD AUTO: 158 10*3/MM3 (ref 140–450)
PMV BLD AUTO: 11.2 FL (ref 6–12)
POTASSIUM SERPL-SCNC: 4.5 MMOL/L (ref 3.5–5.2)
RBC # BLD AUTO: 4.43 10*6/MM3 (ref 4.14–5.8)
SODIUM SERPL-SCNC: 140 MMOL/L (ref 136–145)
WBC # BLD AUTO: 9.39 10*3/MM3 (ref 3.4–10.8)

## 2021-04-24 PROCEDURE — 80048 BASIC METABOLIC PNL TOTAL CA: CPT | Performed by: PHYSICIAN ASSISTANT

## 2021-04-24 PROCEDURE — 85025 COMPLETE CBC W/AUTO DIFF WBC: CPT | Performed by: PHYSICIAN ASSISTANT

## 2021-04-24 PROCEDURE — 99024 POSTOP FOLLOW-UP VISIT: CPT | Performed by: RADIOLOGY

## 2021-04-24 RX ORDER — CLOPIDOGREL BISULFATE 75 MG/1
75 TABLET ORAL DAILY
Qty: 30 TABLET | Refills: 1 | Status: SHIPPED | OUTPATIENT
Start: 2021-04-25 | End: 2021-04-26

## 2021-04-24 RX ADMIN — DORZOLAMIDE HYDROCHLORIDE 1 DROP: 20 SOLUTION/ DROPS OPHTHALMIC at 09:00

## 2021-04-24 RX ADMIN — SODIUM CHLORIDE, PRESERVATIVE FREE 3 ML: 5 INJECTION INTRAVENOUS at 08:13

## 2021-04-24 RX ADMIN — LEVOTHYROXINE SODIUM 137 MCG: 25 TABLET ORAL at 06:12

## 2021-04-24 RX ADMIN — CLOPIDOGREL BISULFATE 75 MG: 75 TABLET ORAL at 08:14

## 2021-04-24 RX ADMIN — BUPRENORPHINE AND NALOXONE 2 TABLET: 8; 2 TABLET SUBLINGUAL at 08:14

## 2021-04-24 RX ADMIN — APIXABAN 5 MG: 5 TABLET, FILM COATED ORAL at 08:14

## 2021-04-24 RX ADMIN — TIMOLOL MALEATE 1 DROP: 5 SOLUTION/ DROPS OPHTHALMIC at 09:00

## 2021-04-25 ENCOUNTER — READMISSION MANAGEMENT (OUTPATIENT)
Dept: CALL CENTER | Facility: HOSPITAL | Age: 66
End: 2021-04-25

## 2021-04-25 NOTE — OUTREACH NOTE
Prep Survey      Responses   [facility] facility patient discharged from?  [place]   Is LACE score < 7 ?  No   Emergency Room discharge w/ pulse ox?  No   Eligibility  Readm Mgmt   Discharge diagnosis  High-grade, asymptomatic right carotid stenosis   Does the patient have one of the following disease processes/diagnoses(primary or secondary)?  Other   Does the patient have Home health ordered?  No   Is there a DME ordered?  No   Prep survey completed?  Yes          Anitra Hopper RN

## 2021-04-26 ENCOUNTER — READMISSION MANAGEMENT (OUTPATIENT)
Dept: CALL CENTER | Facility: HOSPITAL | Age: 66
End: 2021-04-26

## 2021-04-26 RX ORDER — CLOPIDOGREL BISULFATE 75 MG/1
75 TABLET ORAL DAILY
Qty: 30 TABLET | Refills: 1 | Status: SHIPPED | OUTPATIENT
Start: 2021-04-26 | End: 2021-04-28

## 2021-04-26 NOTE — OUTREACH NOTE
Medical Week 1 Survey      Responses   [facility] patient discharged from?  [place]   Does the patient have one of the following disease processes/diagnoses(primary or secondary)?  Other   Week 1 attempt successful?  Yes   Call start time  0901   Call end time  0905   Meds reviewed with patient/caregiver?  Yes   Is the patient having any side effects they believe may be caused by any medication additions or changes?  No   Does the patient have all medications ordered at discharge?  Yes   Is the patient taking all medications as directed (includes completed medication regime)?  Yes   Comments regarding appointments  appointments are scheduled   Does the patient have a primary care provider?   Yes   Does the patient have an appointment with their PCP within 7 days of discharge?  Yes   Has the patient kept scheduled appointments due by today?  N/A   Has home health visited the patient within 72 hours of discharge?  N/A   Did the patient receive a copy of their discharge instructions?  Yes   Nursing interventions  Reviewed instructions with patient   What is the patient's perception of their health status since discharge?  Improving   Is the patient/caregiver able to teach back signs and symptoms related to disease process for when to call 911?  Yes   Is the patient/caregiver able to teach back the hierarchy of who to call/visit for symptoms/problems? PCP, Specialist, Home health nurse, Urgent Care, ED, 911  Yes   If the patient is a current smoker, are they able to teach back resources for cessation?  Not a smoker   Week 1 call completed?  Yes   Wrap up additional comments  saige had no complaints today, reviewed No lifting greater than 20 pounds for 7 days. Avoid activitiesNo lifting greater than 20 pounds for 7 days. Avoid activities          Yolanda Thompson RN

## 2021-04-27 LAB — CREAT BLDA-MCNC: 1.1 MG/DL (ref 0.6–1.3)

## 2021-04-28 RX ORDER — CLOPIDOGREL BISULFATE 75 MG/1
75 TABLET ORAL DAILY
Qty: 90 TABLET | Refills: 3 | Status: SHIPPED | OUTPATIENT
Start: 2021-04-28 | End: 2021-06-02

## 2021-05-12 ENCOUNTER — OFFICE VISIT (OUTPATIENT)
Dept: NEUROLOGY | Facility: CLINIC | Age: 66
End: 2021-05-12

## 2021-05-12 VITALS
TEMPERATURE: 98 F | DIASTOLIC BLOOD PRESSURE: 74 MMHG | BODY MASS INDEX: 27.09 KG/M2 | HEIGHT: 67 IN | OXYGEN SATURATION: 100 % | HEART RATE: 72 BPM | WEIGHT: 172.6 LBS | SYSTOLIC BLOOD PRESSURE: 132 MMHG

## 2021-05-12 DIAGNOSIS — I69.30 SEQUELAE, POST-STROKE: Primary | ICD-10-CM

## 2021-05-12 PROCEDURE — 99214 OFFICE O/P EST MOD 30 MIN: CPT | Performed by: PSYCHIATRY & NEUROLOGY

## 2021-05-12 NOTE — PROGRESS NOTES
Subjective:    CC: Patrick Shay is seen today  for Stroke       HPI:  Current visit-patient denies having any strokelike symptoms since his last visit. He had right carotid  angioplasty /stent placement in April and was placed on Plavix in addition to his Eliquis. He has been feeling less dizzy since the procedure. His blood pressure was also dropping down to low hence he stopped taking his amlodipine however some home readings are in the 150s. He also continues to take Lipitor 80 mg daily. He had his Holter monitor which showed a 24 beat run of SVT as well as short episodes of atrial tachycardia but no A. fib.    Last visit-since the last visit patient denies having any new strokelike symptoms.  He continues to take aspirin along with Eliquis and Lipitor 80 mg.  He had his carotid Doppler which showed right ICA stenosis of over 70% and severe proximal left CCA stenosis with total occlusion of left ICA.  He also had a CT angiogram of the head and neck that continues to show 50% right ICA stenosis as well as occluded left CCA and proximal left ICA along its entire cervical segment, with reconstitution of the petrous segment.  The previously seen distal left MCA stenosis was not noted at this time.  He also had a Holter monitor the results of which are still pending.  His blood pressure is much better after he cut back on his amlodipine.  Most readings were between 1 10-1 30.    Initial [id number]fal[age]-old male accompanied by his girlfriend with a history of hypertension, hyperlipidemia, laryngeal cancer status post tracheostomy in 1998 (uses a voice assist device), legally blind in left eye due to eye injury presents as a hospital follow-up for stroke.  Patient states that he had difficulty getting his words out on 9/29.  He was brought to the hospital where an initial CT scan of the head edema in the left frontal region. After that he had a CT angiogram of the head which showed 50 to 60% of the left cavernous  carotid stenosis and superior MCA division occlusion and a CT angiogram of the neck that showed near occlusion of the mid left common carotid artery as well as possible occlusion of the left ICA from the level of the mid left ICA to the proximal petrous segment.  Also 50 to 75% stenosis of the right carotid bulb and narrowing of right ICA to between 50 to 60% noted as well as moderate right mid vertebral stenosis.  CT perfusion showed focal ischemia in the left frontotemporal region.  MRI brain showed an area of restricted diffusion in the left frontotemporal region including the left basal ganglia and insular cortex.  There was some question of whether the patient was taking Xarelto however patient denies that and states that his mother was on Xarelto not him.  He does not have a known history of atrial fibrillation.  Was started on Eliquis 5 mg twice a day (for presumable cardio embolic source) and aspirin 81 mg daily in addition to Lipitor 80 mg daily.  His lipid panel was as follows-, TG 96, LDL 65, HDL 37.  A1c was 5.6.  He states that his speech problems have now subsided.  Of note-I personally reviewed his CT angiogram of the head and neck and his MRI brain as well as 's note    The following portions of the patient's history were reviewed today and updated as of 12/15/2020  : allergies, current medications, past family history, past medical history, past social history, past surgical history and problem list  These document will be scanned to patient's chart.      Current Outpatient Medications:   •  apixaban (ELIQUIS) 5 MG tablet tablet, Take 1 tablet by mouth Every 12 (Twelve) Hours. Indications: stroke, Disp: 60 tablet, Rfl: 3  •  atorvastatin (LIPITOR) 80 MG tablet, Take 80 mg by mouth Daily., Disp: , Rfl:   •  bimatoprost (LUMIGAN) 0.01 % ophthalmic drops, Administer 1 drop into the left eye Every Night., Disp: , Rfl:   •  brimonidine-timolol (COMBIGAN) 0.2-0.5 % ophthalmic solution,  Administer 1 drop into the left eye Every 12 (Twelve) Hours., Disp: , Rfl:   •  buprenorphine-naloxone (SUBOXONE) 8-2 MG per SL tablet, Place 2 tablets under the tongue Daily., Disp: , Rfl:   •  clopidogrel (PLAVIX) 75 MG tablet, TAKE 1 TABLET BY MOUTH DAILY, Disp: 90 tablet, Rfl: 3  •  dorzolamide-timolol (COSOPT) 22.3-6.8 MG/ML ophthalmic solution, Administer 1 drop into the left eye 2 (Two) Times a Day., Disp: , Rfl:   •  levothyroxine (SYNTHROID, LEVOTHROID) 137 MCG tablet, Take 137 mcg by mouth Daily., Disp: , Rfl:   •  Sonidegib Phosphate (Odomzo) 200 MG capsule, Take 200 mg by mouth Daily., Disp: , Rfl:   •  amLODIPine (NORVASC) 5 MG tablet, Take 5 mg by mouth Daily., Disp: , Rfl:    Past Medical History:   Diagnosis Date   • Disease of thyroid gland    • Hyperlipidemia    • Hypertension    • Laryngeal cancer (CMS/HCC)       Past Surgical History:   Procedure Laterality Date   • CAROTID STENT Bilateral 4/23/2021    Procedure: Carotid Stent;  Surgeon: Anjel Covarrubias MD;  Location:  [facility];  Service: Interventional Radiology;  Laterality: Bilateral;   • ESOPHAGEAL DILATATION      EVERY 3-4 MONTHS   • INTERVENTIONAL RADIOLOGY PROCEDURE N/A 9/29/2020    Procedure: IR MECHANICAL THROMBECTOMY - PRIMARY;  Surgeon: Aidan Plasencia MD;  Location:  [facility];  Service: Interventional Radiology;  Laterality: N/A;   • MANDIBLE SURGERY     • TRACHEOSTOMY        History reviewed. No pertinent family history.   Social History     Socioeconomic History   • Marital status:      Spouse name: Not on file   • Number of children: Not on file   • Years of education: Not on file   • Highest education level: Not on file   Tobacco Use   • Smoking status: Former Smoker   Substance and Sexual Activity   • Alcohol use: Not Currently   • Drug use: Never   • Sexual activity: Yes     Partners: Female     Review of Systems   Neurological: Positive for speech difficulty.   All other  "systems reviewed and are negative.      Objective:    /74   Pulse 72   Temp 98 °F (36.7 °C)   Ht 170 cm (66.93\")   Wt 78.3 kg (172 lb 9.6 oz)   SpO2 100%   BMI 27.09 kg/m²     Neurology Exam:    General apperance: NAD.     Mental status: Alert, awake and oriented to time place and person.    Recent and Remote memory: Intact.    Attention span and Concentration: Normal.     Language and Speech: Intact- moderate dysarthria.  Using a voice assist device to speak    Fluency, Naming , Repitition and Comprehension:  Intact    Cranial Nerves:   CN II: Visual fields Intact in right eye.  He is legally blind in left eye.  Pupils - CHANTEL  CN III, IV and VI: Extraocular movements are intact in right eye.  He could not AB duct his left eye.    CN V: Facial sensation is intact.   CN VII: Muscles of facial expression reveal no asymmetry. Intact.   CN VIII: Hearing is intact. Whispered voice intact.   CN IX and X: Palate elevates symmetrically. Intact  CN XI: Shoulder shrug is intact.   CN XII: Tongue is midline without evidence of atrophy or fasciculation.     Ophthalmoscopic exam of optic disc-poorly visualized    Motor:  Right UE muscle strength 5/5. Normal tone.     Left UE muscle strength 5/5. Normal tone.      Right LE muscle strength5/5. Normal tone.     Left LE muscle strength 5/5. Normal tone.      Sensory: Normal light touch, vibration and pinprick sensation bilaterally.    DTRs: 2+ bilaterally in upper and lower extremities.    Babinski: Negative bilaterally.    Co-ordination: Normal finger-to-nose, heel to shin B/L.    Rhomberg: Negative.    Gait: Normal.    Cardiovascular: Regular rate and rhythm without murmur, gallop or rub.    Assessment and Plan:  1. Sequelae, post-stroke  Patient had a left MCA stroke which could have been due to artery to artery embolus secondary to left ICA occlusion (acute versus chronic) with emboli to the superior branch of left MCA versus a cardioembolic phenomenon  He had a " right ICA carotid stent for asymptomatic severe stenosis  He should continue Plavix along with Eliquis 5 mg twice daily. I do not want to stop Eliquis as his Holter monitor showed SVT and atrial tachycardia  -Also continue Lipitor 80 mg daily for goal LDL of less than 100.  His LDL was 65  -Goal blood pressure less than 130/90. Patient has stopped taking amlodipine as his blood pressure was running too low but I have told him to take half a tablet or 2.5 mg on days that his systolic blood pressure is high       Return in about 5 months (around 10/12/2021).       Tyra Barakat MD

## 2021-06-02 ENCOUNTER — OFFICE VISIT (OUTPATIENT)
Dept: NEUROSURGERY | Facility: CLINIC | Age: 66
End: 2021-06-02

## 2021-06-02 VITALS
HEIGHT: 67 IN | TEMPERATURE: 97.6 F | WEIGHT: 175 LBS | BODY MASS INDEX: 27.47 KG/M2 | DIASTOLIC BLOOD PRESSURE: 72 MMHG | SYSTOLIC BLOOD PRESSURE: 132 MMHG

## 2021-06-02 DIAGNOSIS — I65.21 ASYMPTOMATIC STENOSIS OF RIGHT CAROTID ARTERY: Primary | ICD-10-CM

## 2021-06-02 PROCEDURE — 99024 POSTOP FOLLOW-UP VISIT: CPT | Performed by: RADIOLOGY

## 2021-06-02 RX ORDER — ASPIRIN 81 MG/1
81 TABLET, CHEWABLE ORAL DAILY
Qty: 30 TABLET | Refills: 5 | Status: SHIPPED | OUTPATIENT
Start: 2021-06-02

## 2021-06-02 NOTE — PROGRESS NOTES
"NAME: SHAINA PICHARDO   DOS: 2021  : 1955  PCP: Ernie Hsieh MD    Chief Complaint:    Chief Complaint   Patient presents with   • Carotid Artery Disease     s/p right carotid stent       History of Present Illness:  65 y.o. male who is well-known to the neuro interventional service, having been previously seen in consultation regarding left carotid occlusion and right carotid stenosis.  He initially presented to [facility] in 2020 with left MCA stroke secondary to left carotid occlusion, presenting with some alteration of speech and facial droop.  He has made a significant recovery since that time, and essentially returned to his neurologic baseline.  He does have a history of laryngeal cancer and is status post prior surgery/radiation therapy, but communicates very well with a vocal assist device. His right carotid disease at that time was deemed \"borderline\" hemodynamically significant lesion, and thus he was treated with aggressive medical management.  He is being evaluated for paroxysmal atrial fibrillation, and is currently on an Eliquis/aspirin regimen.     He was recently seen in the neuro interventional clinic, and carotid duplex demonstrated interval progression of disease involving his right carotid vasculature, with now high-grade stenosis (peak velocities 541/267 cm/s).  Given his contralateral carotid occlusion, combined with history of laryngectomy/radiation therapy, he was deemed a \"high surgical risk\" for endarterectomy.  He was admitted on 2021, and underwent diagnostic angiography, redemonstrating a left carotid occlusion, as well as progression of disease at the right carotid bifurcation with now high-grade (greater than 80%) stenosis.  Given the aforementioned high risk features, his right carotid stenosis was treated with angioplasty/stent placement (with EPD), restoring a near normal caliber lumen and markedly improved perfusion to the right " cerebral hemisphere.  He made an uneventful recovery in the neuro intensive care unit, and was discharged home at his neurologic baseline on 4/24/2021.  He was discharged home on a Plavix/Eliquis regimen, and presents today for routine follow-up.    Past Medical History:  Past Medical History:   Diagnosis Date   • Disease of thyroid gland    • Hyperlipidemia    • Hypertension    • Laryngeal cancer (CMS/HCC)        Past Surgical History:  Past Surgical History:   Procedure Laterality Date   • CAROTID STENT Bilateral 4/23/2021    Procedure: Carotid Stent;  Surgeon: Anjel Covarrubias MD;  Location:  [facility];  Service: Interventional Radiology;  Laterality: Bilateral;   • ESOPHAGEAL DILATATION      EVERY 3-4 MONTHS   • INTERVENTIONAL RADIOLOGY PROCEDURE N/A 9/29/2020    Procedure: IR MECHANICAL THROMBECTOMY - PRIMARY;  Surgeon: Aidan Plasencia MD;  Location:  [facility];  Service: Interventional Radiology;  Laterality: N/A;   • MANDIBLE SURGERY     • TRACHEOSTOMY         Review of Systems:        Review of Systems   Neurological: Positive for speech difficulty.   All other systems reviewed and are negative.       Medications    Current Outpatient Medications:   •  amLODIPine (NORVASC) 5 MG tablet, Take 5 mg by mouth Daily., Disp: , Rfl:   •  apixaban (ELIQUIS) 5 MG tablet tablet, Take 1 tablet by mouth Every 12 (Twelve) Hours. Indications: stroke, Disp: 60 tablet, Rfl: 3  •  aspirin 81 MG chewable tablet, Chew 1 tablet Daily., Disp: 30 tablet, Rfl: 5  •  atorvastatin (LIPITOR) 80 MG tablet, Take 80 mg by mouth Daily., Disp: , Rfl:   •  bimatoprost (LUMIGAN) 0.01 % ophthalmic drops, Administer 1 drop into the left eye Every Night., Disp: , Rfl:   •  brimonidine-timolol (COMBIGAN) 0.2-0.5 % ophthalmic solution, Administer 1 drop into the left eye Every 12 (Twelve) Hours., Disp: , Rfl:   •  buprenorphine-naloxone (SUBOXONE) 8-2 MG per SL tablet, Place 2 tablets under the  tongue Daily., Disp: , Rfl:   •  dorzolamide-timolol (COSOPT) 22.3-6.8 MG/ML ophthalmic solution, Administer 1 drop into the left eye 2 (Two) Times a Day., Disp: , Rfl:   •  levothyroxine (SYNTHROID, LEVOTHROID) 137 MCG tablet, Take 137 mcg by mouth Daily., Disp: , Rfl:   •  Sonidegib Phosphate (Odomzo) 200 MG capsule, Take 200 mg by mouth Daily., Disp: , Rfl:     Allergies:  No Known Allergies    Social Hx:  Social History     Tobacco Use   • Smoking status: Former Smoker   Substance Use Topics   • Alcohol use: Not Currently   • Drug use: Never       Family Hx:  No family history on file.    Review of Imaging:  No new imaging.    Physical Examination:  Vitals:    06/02/21 1157   BP: 132/72   Temp: 97.6 °F (36.4 °C)        General Appearance:   Well developed, well nourished, well groomed, alert, and cooperative.  Cardiovascular: Regular rate and rhythm. No carotid bruits      Neurological examination:  Neurologic Exam     Mental Status   Oriented to person, place, and time.   Speech: (Communicates very well with a vocal assist device, status post laryngectomy.)  Level of consciousness: alert    Cranial Nerves   Cranial nerves II through XII intact.     Motor Exam     Strength   Strength 5/5 throughout.     Sensory Exam   Light touch normal.     Gait, Coordination, and Reflexes     Gait  Gait: normal      Diagnoses/Plan:    Mr. Shay is a 65 y.o. male status post angioplasty/stent placement for high-grade, asymptomatic right carotid stenosis.  He is doing very well since his procedure, with no stroke or TIA-like symptoms.  At this point, I think it be reasonable to transition him from his Eliquis/Plavix regimen back to Eliquis/81 mg aspirin regimen.  However, I do think he will need to be on lifelong anticoagulation/aspirin or dual antiplatelet therapy.  I plan on seeing him back in 6 months time with carotid duplex to ensure stability and exclude any recurrent/progression of disease that might necessitate  further treatment/intervention.

## 2021-08-05 ENCOUNTER — TELEPHONE (OUTPATIENT)
Dept: NEUROLOGY | Facility: CLINIC | Age: 66
End: 2021-08-05

## 2021-08-05 NOTE — TELEPHONE ENCOUNTER
Caller: JOHN    Relationship: [facility]    Best call back number: [phone]    What was the call regarding: SOONER APPT.     Do you require a callback: YES     PT IS HAVING WEAKNESS IN BOTH OF HIS LEGS.  LIKE WHEN GETS OUT OF CAR, ZAKIA OR BED.  JOHN SAID THEY JERK . BP HAS BEEN RUNNING LOW   70/54  HE SHE  SAID HE DON'T WANT TO GO TO THE HOSPITAL BUT WILL COME SEE DR SILVER   HE DID GO TO SEE PCP AND HE GAVE HIM BLOOD PRESSURE RX (CARVEDILOL 6.25 AND LISINOPRIL 30ML ) EVEN THOUGH BP WAS LOW?? JOHN SAID HE FEELS WORST WHEN HE TOOK IT      PLEASE CALL AND ADVISE FOR SOONER APPT IF POSSIBLE .

## 2021-08-06 RX ORDER — FLUDROCORTISONE ACETATE 0.1 MG/1
TABLET ORAL
Qty: 30 TABLET | Refills: 3 | Status: SHIPPED | OUTPATIENT
Start: 2021-08-06 | End: 2021-10-14 | Stop reason: SDUPTHER

## 2021-08-06 NOTE — TELEPHONE ENCOUNTER
Patient informed and verbalized all understanding. He will call back in 2 weeks with an update. He had been advised to contact our office if he forgets these instructions.  -TMT

## 2021-08-06 NOTE — TELEPHONE ENCOUNTER
Tell the patient I am calling in a medication called fludrocortisone that he will take in the morning with breakfast.  This helps improve the blood pressure.  He should avoid lying down for 1 to 2 hours after taking the medication and continue to monitor his blood pressure every day and write down the readings.  On days that his blood pressure is still high he can skip taking the medication.  He should also keep himself extremely well-hydrated and drink at least 50 ounces of water daily.  He should call me back in 2 weeks to let me know how he is feeling.

## 2021-08-27 ENCOUNTER — TELEPHONE (OUTPATIENT)
Dept: NEUROLOGY | Facility: CLINIC | Age: 66
End: 2021-08-27

## 2021-08-27 NOTE — TELEPHONE ENCOUNTER
Provider: dr. dugan    Caller: JOHN    Relationship to Patient: [facility]    Phone Number: [phone]    Reason for Call:   CALLING TO GIVE UPDATE ON fludrocortisone 0.1 MG.   PATIENT SAYS HE FEELS BETTER WHILE TAKING IT AND FEELS LIKE IT'S WORKING. UNTIL TODAY, BP HAS IMPROVED AND HE IS HAVING FEWER SPELLS WHERE IT'S VERY LOW. THIS ALYMING'S READING WAS LOWEST IT'S BEEN SINCE STARTING THE MEDICATION: 76/44 AND THEN RIGHT BEFORE [facility] CALLED SHE CHECKED AND IT WAS 89/54. WHEN HE'S NOT HAVING SPELLS, IT'S BEEN AROUND 100 OR A LITTLE UNDER AND THE HIGHEST IT'S BEEN SO FAR SINCE STARTING /? WANTS TO KNOW IF DR. DUGAN NEEDS TO SEE HIM AGAIN BEFORE 10-13? WOULD LIKE TO REQUEST A CALL BACK REGARDLESS JUST TO TOUCH BASE AND TO MAKE SURE I'VE RELAYED EVERYTHING CORRECTLY. PLEASE ADVISE.    When was the patient last seen: 5-12

## 2021-08-27 NOTE — TELEPHONE ENCOUNTER
Given this, patient is wanting to know if you want to see him sooner than 10/13 appointment. Please advise.

## 2021-08-27 NOTE — TELEPHONE ENCOUNTER
Offered sooner appointment. Patient denied, said he'll just keep scheduled appointment. Also, both patient and spouse has been informed that per Dr. Barakat, if he gets dizzy or lightheaded to take 2 tablets of the  Fludrocortisone 0.1. They verbalized all understanding.  -TMT

## 2021-08-27 NOTE — TELEPHONE ENCOUNTER
Let the patient know that I can see him back next month.  If he is not having spells then he can continue fludrocortisone 0.1 mg daily but on days when his blood pressure is too low and he is extremely lightheaded/dizzy he can even take 2 tablets a day.

## 2021-09-12 ENCOUNTER — APPOINTMENT (OUTPATIENT)
Dept: PREADMISSION TESTING | Facility: HOSPITAL | Age: 66
End: 2021-09-12

## 2021-09-12 LAB — SARS-COV-2 RNA PNL SPEC NAA+PROBE: NOT DETECTED

## 2021-09-12 PROCEDURE — C9803 HOPD COVID-19 SPEC COLLECT: HCPCS

## 2021-09-12 PROCEDURE — U0004 COV-19 TEST NON-CDC HGH THRU: HCPCS

## 2021-10-14 ENCOUNTER — OFFICE VISIT (OUTPATIENT)
Dept: NEUROLOGY | Facility: CLINIC | Age: 66
End: 2021-10-14

## 2021-10-14 VITALS
OXYGEN SATURATION: 98 % | DIASTOLIC BLOOD PRESSURE: 74 MMHG | HEIGHT: 66 IN | SYSTOLIC BLOOD PRESSURE: 126 MMHG | WEIGHT: 172.2 LBS | BODY MASS INDEX: 27.68 KG/M2 | HEART RATE: 71 BPM

## 2021-10-14 DIAGNOSIS — R42 DIZZINESS: ICD-10-CM

## 2021-10-14 DIAGNOSIS — I69.30 SEQUELAE, POST-STROKE: Primary | ICD-10-CM

## 2021-10-14 PROCEDURE — 99214 OFFICE O/P EST MOD 30 MIN: CPT | Performed by: PSYCHIATRY & NEUROLOGY

## 2021-10-14 RX ORDER — IBUPROFEN 800 MG/1
800 TABLET ORAL EVERY 6 HOURS PRN
COMMUNITY
Start: 2021-08-04

## 2021-10-14 RX ORDER — ATORVASTATIN CALCIUM 40 MG/1
40 TABLET, FILM COATED ORAL DAILY
Qty: 30 TABLET | Refills: 11 | Status: SHIPPED | OUTPATIENT
Start: 2021-10-14 | End: 2022-02-14 | Stop reason: SDUPTHER

## 2021-10-14 RX ORDER — FLUDROCORTISONE ACETATE 0.1 MG/1
TABLET ORAL
Qty: 30 TABLET | Refills: 3 | Status: SHIPPED | OUTPATIENT
Start: 2021-10-14 | End: 2021-10-19 | Stop reason: SDUPTHER

## 2021-10-14 NOTE — PROGRESS NOTES
Subjective:    CC: Patrick Shay is seen today  for Sequelae, Post-Stroke (5 month follow up )       HPI:  Current visit-patient's wife had called me a few weeks ago stating that patient was having spells with dizziness and lightheadedness on walking where he was turning pale and his blood pressure was dropping to the 90s/60s.  After that I had started him on fludrocortisone 0.1 mg daily 1-2 times a day which has really helped with his symptoms.  He denies having any syncopal episodes.  Also denies having any new strokelike symptoms.  Continues to take aspirin, Eliquis and Lipitor 80 mg.  His next appointment with Dr. Kaplan is in December.    Last visit-patient denies having any strokelike symptoms since his last visit. He had right carotid  angioplasty /stent placement in April and was placed on Plavix in addition to his Eliquis. He has been feeling less dizzy since the procedure. His blood pressure was also dropping down to low hence he stopped taking his amlodipine however some home readings are in the 150s. He also continues to take Lipitor 80 mg daily. He had his Holter monitor which showed a 24 beat run of SVT as well as short episodes of atrial tachycardia but no A. fib.    Last visit-since the last visit patient denies having any new strokelike symptoms.  He continues to take aspirin along with Eliquis and Lipitor 80 mg.  He had his carotid Doppler which showed right ICA stenosis of over 70% and severe proximal left CCA stenosis with total occlusion of left ICA.  He also had a CT angiogram of the head and neck that continues to show 50% right ICA stenosis as well as occluded left CCA and proximal left ICA along its entire cervical segment, with reconstitution of the petrous segment.  The previously seen distal left MCA stenosis was not noted at this time.  He also had a Holter monitor the results of which are still pending.  His blood pressure is much better after he cut back on his amlodipine.  Most  readings were between 1 10-1 30.    Initial flsso-14-ajrm-old male accompanied by his girlfriend with a history of hypertension, hyperlipidemia, laryngeal cancer status post tracheostomy in 1998 (uses a voice assist device), legally blind in left eye due to eye injury presents as a hospital follow-up for stroke.  Patient states that he had difficulty getting his words out on 9/29.  He was brought to the hospital where an initial CT scan of the head edema in the left frontal region. After that he had a CT angiogram of the head which showed 50 to 60% of the left cavernous carotid stenosis and superior MCA division occlusion and a CT angiogram of the neck that showed near occlusion of the mid left common carotid artery as well as possible occlusion of the left ICA from the level of the mid left ICA to the proximal petrous segment.  Also 50 to 75% stenosis of the right carotid bulb and narrowing of right ICA to between 50 to 60% noted as well as moderate right mid vertebral stenosis.  CT perfusion showed focal ischemia in the left frontotemporal region.  MRI brain showed an area of restricted diffusion in the left frontotemporal region including the left basal ganglia and insular cortex.  There was some question of whether the patient was taking Xarelto however patient denies that and states that his mother was on Xarelto not him.  He does not have a known history of atrial fibrillation.  Was started on Eliquis 5 mg twice a day (for presumable cardio embolic source) and aspirin 81 mg daily in addition to Lipitor 80 mg daily.  His lipid panel was as follows-, TG 96, LDL 65, HDL 37.  A1c was 5.6.  He states that his speech problems have now subsided.  Of note-I personally reviewed his CT angiogram of the head and neck and his MRI brain as well as 's note    The following portions of the patient's history were reviewed today and updated as of 12/15/2020  : allergies, current medications, past family  history, past medical history, past social history, past surgical history and problem list  These document will be scanned to patient's chart.      Current Outpatient Medications:   •  apixaban (ELIQUIS) 5 MG tablet tablet, Take 1 tablet by mouth Every 12 (Twelve) Hours. Indications: stroke, Disp: 60 tablet, Rfl: 3  •  aspirin 81 MG chewable tablet, Chew 1 tablet Daily., Disp: 30 tablet, Rfl: 5  •  bimatoprost (LUMIGAN) 0.01 % ophthalmic drops, Administer 1 drop into the left eye Every Night., Disp: , Rfl:   •  brimonidine-timolol (COMBIGAN) 0.2-0.5 % ophthalmic solution, Administer 1 drop into the left eye Every 12 (Twelve) Hours., Disp: , Rfl:   •  buprenorphine-naloxone (SUBOXONE) 8-2 MG per SL tablet, Place 2 tablets under the tongue Daily., Disp: , Rfl:   •  dorzolamide-timolol (COSOPT) 22.3-6.8 MG/ML ophthalmic solution, Administer 1 drop into the left eye 2 (Two) Times a Day., Disp: , Rfl:   •  fludrocortisone 0.1 MG tablet, Take 1 tablet by mouth daily with breakfast.  Avoid lying down for 2 hours after taking the medication to prevent supine hypertension, Disp: 30 tablet, Rfl: 3  •  ibuprofen (ADVIL,MOTRIN) 800 MG tablet, , Disp: , Rfl:   •  levothyroxine (SYNTHROID, LEVOTHROID) 137 MCG tablet, Take 137 mcg by mouth Daily., Disp: , Rfl:   •  atorvastatin (Lipitor) 40 MG tablet, Take 1 tablet by mouth Daily., Disp: 30 tablet, Rfl: 11   Past Medical History:   Diagnosis Date   • Disease of thyroid gland    • Hyperlipidemia    • Hypertension    • Laryngeal cancer (HCC)       Past Surgical History:   Procedure Laterality Date   • CAROTID STENT Bilateral 4/23/2021    Procedure: Carotid Stent;  Surgeon: Anjel Covarrubias MD;  Location: [facility] INVASIVE LOCATION;  Service: Interventional Radiology;  Laterality: Bilateral;   • ESOPHAGEAL DILATATION      EVERY 3-4 MONTHS   • INTERVENTIONAL RADIOLOGY PROCEDURE N/A 9/29/2020    Procedure: IR MECHANICAL THROMBECTOMY - PRIMARY;  Surgeon: Aidan Plasencia MD;  " Location: [facility] INVASIVE LOCATION;  Service: Interventional Radiology;  Laterality: N/A;   • MANDIBLE SURGERY     • TRACHEOSTOMY        Family History   Problem Relation Age of Onset   • No Known Problems Mother    • No Known Problems Father    • No Known Problems Sister    • No Known Problems Brother    • No Known Problems Maternal Aunt    • No Known Problems Maternal Uncle    • No Known Problems Paternal Aunt    • No Known Problems Paternal Uncle    • No Known Problems Maternal Grandmother    • No Known Problems Maternal Grandfather    • No Known Problems Paternal Grandmother    • No Known Problems Paternal Grandfather    • No Known Problems Other    • Ataxia Neg Hx    • Chorea Neg Hx    • Dementia Neg Hx    • Mental retardation Neg Hx    • Migraines Neg Hx    • Multiple sclerosis Neg Hx    • Neurofibromatosis Neg Hx    • Neuropathy Neg Hx    • Parkinsonism Neg Hx    • Seizures Neg Hx    • Stroke Neg Hx       Social History     Socioeconomic History   • Marital status:    Tobacco Use   • Smoking status: Former Smoker   • Smokeless tobacco: Never Used   Vaping Use   • Vaping Use: Never used   Substance and Sexual Activity   • Alcohol use: Not Currently   • Drug use: Never   • Sexual activity: Yes     Partners: Female     Review of Systems   Neurological: Positive for speech difficulty.   All other systems reviewed and are negative.      Objective:    /74   Pulse 71   Ht 167.6 cm (66\")   Wt 78.1 kg (172 lb 3.2 oz)   SpO2 98%   BMI 27.79 kg/m²     Neurology Exam:    General apperance: NAD.     Mental status: Alert, awake and oriented to time place and person.    Recent and Remote memory: Intact.    Attention span and Concentration: Normal.     Language and Speech: Intact- moderate dysarthria.  Using a voice assist device to speak    Fluency, Naming , Repitition and Comprehension:  Intact    Cranial Nerves:   CN II: Visual fields Intact in right eye.  He is legally blind in left eye.  Pupils - " CHANTEL  CN III, IV and VI: Extraocular movements are intact in right eye.  He could not AB duct his left eye.    CN V: Facial sensation is intact.   CN VII: Muscles of facial expression reveal no asymmetry. Intact.   CN VIII: Hearing is intact. Whispered voice intact.   CN IX and X: Palate elevates symmetrically. Intact  CN XI: Shoulder shrug is intact.   CN XII: Tongue is midline without evidence of atrophy or fasciculation.     Ophthalmoscopic exam of optic disc-poorly visualized    Motor:  Right UE muscle strength 5/5. Normal tone.     Left UE muscle strength 5/5. Normal tone.      Right LE muscle strength5/5. Normal tone.     Left LE muscle strength 5/5. Normal tone.      Sensory: Normal light touch, vibration and pinprick sensation bilaterally.    DTRs: 2+ bilaterally in upper and lower extremities.    Babinski: Negative bilaterally.    Co-ordination: Normal finger-to-nose, heel to shin B/L.    Rhomberg: Negative.    Gait: Normal.    Cardiovascular: Regular rate and rhythm without murmur, gallop or rub.    Assessment and Plan:  1. Sequelae, post-stroke  Patient had a left MCA stroke which could have been due to artery to artery embolus secondary to left ICA occlusion (acute versus chronic) with emboli to the superior branch of left MCA versus a cardioembolic phenomenon  He had a right ICA carotid stent for asymptomatic severe stenosis  He should continue aspirin along with Eliquis 5 mg twice daily. I do not want to stop Eliquis as his Holter monitor showed SVT and atrial tachycardia  -I will cut back on his Lipitor to 40 mg daily for goal LDL of less than 100.  His LDL was 65  -Goal blood pressure less than 130/90.      2.  Dizziness  Most likely due to orthostatic hypotension  I have started him on fludrocortisone 0.1 mg 1-2 times a day  I have told him to continue monitoring his blood pressure to make sure it is not increasing rapidly  I have also counseled him to keep himself well-hydrated and start wearing  above-knee moderate compression stockings    Return in about 4 months (around 2/14/2022).       Tyra Barakat MD

## 2021-10-18 ENCOUNTER — TELEPHONE (OUTPATIENT)
Dept: NEUROLOGY | Facility: CLINIC | Age: 66
End: 2021-10-18

## 2021-10-18 NOTE — TELEPHONE ENCOUNTER
Caller: JOHN KERN    Relationship: [employer]    Best call back number: [phone]    What medications are you currently taking: SLUDROCORTISONE  Current Outpatient Medications on File Prior to Visit   Medication Sig Dispense Refill   • apixaban (ELIQUIS) 5 MG tablet tablet Take 1 tablet by mouth Every 12 (Twelve) Hours. Indications: stroke 60 tablet 3   • aspirin 81 MG chewable tablet Chew 1 tablet Daily. 30 tablet 5   • atorvastatin (Lipitor) 40 MG tablet Take 1 tablet by mouth Daily. 30 tablet 11   • bimatoprost (LUMIGAN) 0.01 % ophthalmic drops Administer 1 drop into the left eye Every Night.     • brimonidine-timolol (COMBIGAN) 0.2-0.5 % ophthalmic solution Administer 1 drop into the left eye Every 12 (Twelve) Hours.     • buprenorphine-naloxone (SUBOXONE) 8-2 MG per SL tablet Place 2 tablets under the tongue Daily.     • dorzolamide-timolol (COSOPT) 22.3-6.8 MG/ML ophthalmic solution Administer 1 drop into the left eye 2 (Two) Times a Day.     • fludrocortisone 0.1 MG tablet Take 1 tablet by mouth daily with breakfast.  Avoid lying down for 2 hours after taking the medication to prevent supine hypertension 30 tablet 3   • ibuprofen (ADVIL,MOTRIN) 800 MG tablet      • levothyroxine (SYNTHROID, LEVOTHROID) 137 MCG tablet Take 137 mcg by mouth Daily.       No current facility-administered medications on file prior to visit.          Which medication are you concerned about:SLUDROCORTISONE     Who prescribed you this medication: DR. SILVER    What are your concerns: DR. SILVER WAS SUPPOSE TO CHANGE THE DIRECTIONS ON THE MEDICATION SLUDROCORTISONE TO 1-2 TABLETS ONCE A DAY. PLEASE SEND A SCRIPT WITH THE NEW DIRECTIONS ON THE MEDICATION SLUDROCORTISONE. THE PT HAS BEEN TAKING 2 TABLETS SOMETIMES. SHE STATES THAT THERE IS A SCRIPT AT [facility] FOR THE 1 TABLET A DAY ALREADY BUT DOES NEED A SCRIPT TO SAY PT CAN TAKE 1-2 TABLETS A DAY.

## 2021-10-19 RX ORDER — FLUDROCORTISONE ACETATE 0.1 MG/1
TABLET ORAL
Qty: 60 TABLET | Refills: 3 | Status: SHIPPED | OUTPATIENT
Start: 2021-10-19 | End: 2022-01-31

## 2021-11-01 RX ORDER — APIXABAN 5 MG/1
TABLET, FILM COATED ORAL
Qty: 60 TABLET | Refills: 3 | OUTPATIENT
Start: 2021-11-01

## 2021-12-01 ENCOUNTER — OFFICE VISIT (OUTPATIENT)
Dept: NEUROSURGERY | Facility: CLINIC | Age: 66
End: 2021-12-01

## 2021-12-01 ENCOUNTER — HOSPITAL ENCOUNTER (OUTPATIENT)
Dept: CARDIOLOGY | Facility: HOSPITAL | Age: 66
Discharge: HOME | End: 2021-12-01
Admitting: RADIOLOGY

## 2021-12-01 VITALS
BODY MASS INDEX: 29.89 KG/M2 | HEIGHT: 66 IN | TEMPERATURE: 97.6 F | WEIGHT: 186 LBS | DIASTOLIC BLOOD PRESSURE: 58 MMHG | SYSTOLIC BLOOD PRESSURE: 126 MMHG

## 2021-12-01 DIAGNOSIS — I65.21 CAROTID STENOSIS, ASYMPTOMATIC, RIGHT: Primary | ICD-10-CM

## 2021-12-01 DIAGNOSIS — I65.21 ASYMPTOMATIC STENOSIS OF RIGHT CAROTID ARTERY: ICD-10-CM

## 2021-12-01 LAB
BH CV DISTAL RIGHT ICA HIDDEN LRR: 1 CM
BH CV MID RIGHT ICA HIDDEN LRR: 1 CM
BH CV RIGHT CCA HIDDEN LRR: 1 CM/S
BH CV VAS CAROTID RIGHT DISTAL STENT EDV: 89 CM/S
BH CV VAS CAROTID RIGHT DISTAL STENT PSV: 218 CM/S
BH CV VAS CAROTID RIGHT DISTAL TO STENT NATIVE VESSEL E: 88 CM/S
BH CV VAS CAROTID RIGHT DISTAL TO STENT PSV: 182 CM/S
BH CV VAS CAROTID RIGHT MID STENT EDV: 157 CM/S
BH CV VAS CAROTID RIGHT MID STENT PSV: 375 CM/S
BH CV VAS CAROTID RIGHT PROXIMAL STENT EDV: 193 CM/S
BH CV VAS CAROTID RIGHT PROXIMAL STENT PSV: 521 CM/S
BH CV VAS CAROTID RIGHT STENT NATIVE VESSEL PROXIMAL EDV: 130 CM/S
BH CV VAS CAROTID RIGHT STENT NATIVE VESSEL PROXIMAL PS: 348 CM/S
BH CV XLRA MEAS LEFT DIST CCA EDV: 26.2 CM/SEC
BH CV XLRA MEAS LEFT DIST CCA PSV: 95.2 CM/SEC
BH CV XLRA MEAS LEFT MID CCA EDV: 38.4 CM/SEC
BH CV XLRA MEAS LEFT MID CCA PSV: 113.5 CM/SEC
BH CV XLRA MEAS LEFT PROX CCA EDV: 70.7 CM/SEC
BH CV XLRA MEAS LEFT PROX CCA PSV: 201.1 CM/SEC
BH CV XLRA MEAS LEFT PROX ECA EDV: 53.9 CM/SEC
BH CV XLRA MEAS LEFT PROX ECA PSV: 147 CM/SEC
BH CV XLRA MEAS LEFT PROX SCLA PSV: 353.6 CM/SEC
BH CV XLRA MEAS LEFT VERTEBRAL A EDV: 42.7 CM/SEC
BH CV XLRA MEAS LEFT VERTEBRAL A PSV: 110 CM/SEC
BH CV XLRA MEAS RIGHT DIST CCA EDV: 157 CM/SEC
BH CV XLRA MEAS RIGHT DIST CCA PSV: 375 CM/SEC
BH CV XLRA MEAS RIGHT DIST ICA EDV: 88 CM/SEC
BH CV XLRA MEAS RIGHT DIST ICA PSV: 182 CM/SEC
BH CV XLRA MEAS RIGHT ICA/CCA RATIO: 1.31
BH CV XLRA MEAS RIGHT MID CCA EDV: 130 CM/SEC
BH CV XLRA MEAS RIGHT MID CCA PSV: 348 CM/SEC
BH CV XLRA MEAS RIGHT MID ICA EDV: 89 CM/SEC
BH CV XLRA MEAS RIGHT MID ICA PSV: 218 CM/SEC
BH CV XLRA MEAS RIGHT PROX CCA EDV: 66.2 CM/SEC
BH CV XLRA MEAS RIGHT PROX CCA PSV: 167.2 CM/SEC
BH CV XLRA MEAS RIGHT PROX ECA EDV: 173.7 CM/SEC
BH CV XLRA MEAS RIGHT PROX ECA PSV: 452 CM/SEC
BH CV XLRA MEAS RIGHT PROX ICA EDV: 101 CM/SEC
BH CV XLRA MEAS RIGHT PROX ICA PSV: 210 CM/SEC
BH CV XLRA MEAS RIGHT PROX SCLA PSV: 275.9 CM/SEC
BH CV XLRA MEAS RIGHT VERTEBRAL A EDV: 50 CM/SEC
BH CV XLRA MEAS RIGHT VERTEBRAL A PSV: 143 CM/SEC
BH CVPROX RIGHT ICA HIDDEN LRR: 1 CM
LEFT ARM BP: NORMAL MMHG
MAXIMAL PREDICTED HEART RATE: 153 BPM
RIGHT ARM BP: NORMAL MMHG
STRESS TARGET HR: 130 BPM

## 2021-12-01 PROCEDURE — 93880 EXTRACRANIAL BILAT STUDY: CPT

## 2021-12-01 PROCEDURE — 99214 OFFICE O/P EST MOD 30 MIN: CPT | Performed by: RADIOLOGY

## 2021-12-01 PROCEDURE — 93880 EXTRACRANIAL BILAT STUDY: CPT | Performed by: INTERNAL MEDICINE

## 2021-12-01 NOTE — PROGRESS NOTES
"NAME: SHAINA PICHARDO   DOS: 2021  : 1955  PCP: Ernie Hsieh MD    Chief Complaint:    Chief Complaint   Patient presents with   • Stenosis carotid artery     right       History of Present Illness:  66 y.o. male who is well-known to the neuro interventional service, having been previously seen in consultation regarding left carotid occlusion and right carotid stenosis.  He initially presented to [facility] in 2020 with left MCA stroke secondary to left carotid occlusion, presenting with some alteration of speech and facial droop.  He has made a significant recovery since that time, and essentially returned to his neurologic baseline.  He does have a history of laryngeal cancer and is status post prior surgery/radiation therapy, but communicates very well with a vocal assist device.    In 2021, carotid duplex demonstrated significantly increased velocities within the right carotid vasculature concerning for high-grade right carotid stenosis.  Given his contralateral carotid occlusion combined with prior surgery/radiation to the neck, he was deemed a \"high surgical risk\", and his right carotid stenosis was treated with angioplasty/stent placement on 2021.    He is done very well since I saw him last, denying any recurrent/new stroke or TIA-like symptoms.  He remains on Eliquis/aspirin regimen.  He presents today for routine follow-up.       Past Medical History:  Past Medical History:   Diagnosis Date   • Carotid artery occlusion     Left carotid occlusion   • Carotid stenosis, asymptomatic, right 2021    Status post right carotid stent   • Disease of thyroid gland    • Hyperlipidemia    • Hypertension    • Laryngeal cancer (HCC)        Past Surgical History:  Past Surgical History:   Procedure Laterality Date   • CAROTID STENT Bilateral 2021    Procedure: Carotid Stent;  Surgeon: Anjel Covarrubias MD;  Location: [facility] INVASIVE LOCATION;  " Service: Interventional Radiology;  Laterality: Bilateral;   • ESOPHAGEAL DILATATION      EVERY 3-4 MONTHS   • INTERVENTIONAL RADIOLOGY PROCEDURE N/A 9/29/2020    Procedure: IR MECHANICAL THROMBECTOMY - PRIMARY;  Surgeon: Aidan Plasencia MD;  Location: [facility] INVASIVE LOCATION;  Service: Interventional Radiology;  Laterality: N/A;   • MANDIBLE SURGERY     • TRACHEOSTOMY         Review of Systems:        Review of Systems   HENT: Positive for hearing loss.    Endocrine: Positive for polydipsia.   Neurological: Positive for speech difficulty.   All other systems reviewed and are negative.       Medications    Current Outpatient Medications:   •  apixaban (ELIQUIS) 5 MG tablet tablet, Take 1 tablet by mouth Every 12 (Twelve) Hours. Indications: stroke, Disp: 60 tablet, Rfl: 3  •  aspirin 81 MG chewable tablet, Chew 1 tablet Daily., Disp: 30 tablet, Rfl: 5  •  atorvastatin (Lipitor) 40 MG tablet, Take 1 tablet by mouth Daily., Disp: 30 tablet, Rfl: 11  •  bimatoprost (LUMIGAN) 0.01 % ophthalmic drops, Administer 1 drop into the left eye Every Night., Disp: , Rfl:   •  brimonidine-timolol (COMBIGAN) 0.2-0.5 % ophthalmic solution, Administer 1 drop into the left eye Every 12 (Twelve) Hours., Disp: , Rfl:   •  buprenorphine-naloxone (SUBOXONE) 8-2 MG per SL tablet, Place 2 tablets under the tongue Daily., Disp: , Rfl:   •  dorzolamide-timolol (COSOPT) 22.3-6.8 MG/ML ophthalmic solution, Administer 1 drop into the left eye 2 (Two) Times a Day., Disp: , Rfl:   •  fludrocortisone 0.1 MG tablet, Take 1-2  tablet by mouth daily with breakfast.  Avoid lying down for 2 hours after taking the medication to prevent supine hypertension, Disp: 60 tablet, Rfl: 3  •  ibuprofen (ADVIL,MOTRIN) 800 MG tablet, , Disp: , Rfl:   •  levothyroxine (SYNTHROID, LEVOTHROID) 137 MCG tablet, Take 137 mcg by mouth Daily., Disp: , Rfl:     Allergies:  No Known Allergies    Social Hx:  Social History     Tobacco Use   • Smoking status:  Former Smoker   • Smokeless tobacco: Never Used   Vaping Use   • Vaping Use: Never used   Substance Use Topics   • Alcohol use: Not Currently   • Drug use: Never       Family Hx:  Family History   Problem Relation Age of Onset   • No Known Problems Mother    • No Known Problems Father    • No Known Problems Sister    • No Known Problems Brother    • No Known Problems Maternal Aunt    • No Known Problems Maternal Uncle    • No Known Problems Paternal Aunt    • No Known Problems Paternal Uncle    • No Known Problems Maternal Grandmother    • No Known Problems Maternal Grandfather    • No Known Problems Paternal Grandmother    • No Known Problems Paternal Grandfather    • No Known Problems Other    • Ataxia Neg Hx    • Chorea Neg Hx    • Dementia Neg Hx    • Mental retardation Neg Hx    • Migraines Neg Hx    • Multiple sclerosis Neg Hx    • Neurofibromatosis Neg Hx    • Neuropathy Neg Hx    • Parkinsonism Neg Hx    • Seizures Neg Hx    • Stroke Neg Hx        Review of Imaging:  Carotid duplex dated 12/1/2021 from [facility] was reviewed along with its corresponding radiologic report.  Comparison is made to multiple prior studies at [facility], the most recent being on 4/23/2021.  Again seen is chronic occlusion of the left internal carotid artery.  While the right carotid stent does appear widely patent, there are elevated velocities throughout the right carotid vasculature indicative of an indeterminate severity right carotid stenosis.  Peak velocities within the right carotid vasculature are 521/193 cm/s, but with an ICA/CCA ratio of only 1.3.  Prior to his stent placement, peak velocities within the right carotid vasculature were 541/267 cm/s, with a ratio of 4.4 on the 1/26/2021 study.  Following his stent placement, peak velocities within the right carotid vasculature were 234/78 cm/s.      Physical Examination:  Vitals:    12/01/21 1158   BP: 126/58   Temp: 97.6 °F (36.4 °C)         General Appearance:   Well developed, well nourished, well groomed, alert, and cooperative.  Cardiovascular: Regular rate and rhythm.       Neurological examination:  Neurologic Exam     Mental Status   Oriented to person, place, and time.   Speech: (Status post laryngectomy, communicates quite well with the use of a vocal assist device.)  Level of consciousness: alert    Cranial Nerves     CN II   Right visual field deficit: none  Left visual field deficit: Legally blind in the left eye.    CN III, IV, VI   Extraocular motions are normal.     CN V   Facial sensation intact.     CN VII   Facial expression full, symmetric.     CN VIII   CN VIII normal.   Postsurgical changes related to bilateral radical neck dissection.     Motor Exam     Strength   Strength 5/5 throughout.     Sensory Exam   Light touch normal.     Gait, Coordination, and Reflexes     Gait  Gait: normal      Diagnoses/Plan:    Mr. Shay is a 66 y.o. male status post right carotid angioplasty/stent placement on 4/23/2021.  He does have a history of left carotid occlusion, as well as postsurgical and radiation changes to the bilateral neck for laryngeal cancer.  He is maintained on a chronic Eliquis/aspirin regimen, and denies any stroke or TIA-like symptoms.  However, carotid duplex on 12/1/2021 demonstrates an indeterminate severity of stenosis within the right carotid vasculature.  While there are markedly elevated peak velocities within the right carotid vasculature, the ICA/CCA ratio is only 1.3.  While this is concerning for recurrent high-grade stenosis, it is also plausible that this is compensatory increased velocity related to the contralateral carotid occlusion.  I discussed this at length with Mr. Shay and family, and gave them the option of catheter angiogram versus short interim follow-up with carotid duplex for further evaluation, and they have opted for the latter.  I plan on seeing him back in 3 months time with carotid  duplex, to ensure stability and exclude any progression of disease that might necessitate further treatment/intervention.  During the interim, they will contact our office in/or call 911 if he experiences any stroke or TIA-like symptoms.  He will remain on his Eliquis/aspirin regimen indefinitely.

## 2022-01-31 RX ORDER — FLUDROCORTISONE ACETATE 0.1 MG/1
TABLET ORAL
Qty: 60 TABLET | Refills: 3 | Status: SHIPPED | OUTPATIENT
Start: 2022-01-31 | End: 2022-02-24 | Stop reason: SDUPTHER

## 2022-02-06 ENCOUNTER — CLINICAL SUPPORT NO REQUIREMENTS (OUTPATIENT)
Dept: PREADMISSION TESTING | Facility: HOSPITAL | Age: 67
End: 2022-02-06

## 2022-02-06 LAB — SARS-COV-2 RNA PNL SPEC NAA+PROBE: NOT DETECTED

## 2022-02-06 PROCEDURE — C9803 HOPD COVID-19 SPEC COLLECT: HCPCS

## 2022-02-06 PROCEDURE — U0004 COV-19 TEST NON-CDC HGH THRU: HCPCS

## 2022-02-14 ENCOUNTER — OFFICE VISIT (OUTPATIENT)
Dept: NEUROLOGY | Facility: CLINIC | Age: 67
End: 2022-02-14

## 2022-02-14 VITALS
SYSTOLIC BLOOD PRESSURE: 160 MMHG | BODY MASS INDEX: 30.86 KG/M2 | OXYGEN SATURATION: 100 % | WEIGHT: 192 LBS | HEART RATE: 73 BPM | HEIGHT: 66 IN | DIASTOLIC BLOOD PRESSURE: 90 MMHG

## 2022-02-14 DIAGNOSIS — R42 DIZZINESS: ICD-10-CM

## 2022-02-14 DIAGNOSIS — I69.30 SEQUELAE, POST-STROKE: Primary | ICD-10-CM

## 2022-02-14 PROCEDURE — 99214 OFFICE O/P EST MOD 30 MIN: CPT | Performed by: PSYCHIATRY & NEUROLOGY

## 2022-02-14 RX ORDER — ATORVASTATIN CALCIUM 40 MG/1
40 TABLET, FILM COATED ORAL DAILY
Qty: 30 TABLET | Refills: 11 | Status: SHIPPED | OUTPATIENT
Start: 2022-02-14 | End: 2023-02-14

## 2022-02-14 NOTE — PROGRESS NOTES
Subjective:    CC: Patrick Shay is seen today  for Stroke       HPI:  Current visit-patient had a repeat carotid Doppler in December that showed continued left ICA occlusion and a patent stent in the right ICA with increased velocities in the right common and internal carotid artery with ICA/CCA ratio 1.3.  After that he saw Dr. Kaplan who felt that the increased velocities could be due to to compensation from his left ICA occlusion.  He will be repeating his carotid Doppler in 3 months to see if they have further worsened.  Patient denies having any new strokelike symptoms.  He continues to take aspirin 81 mg along with Eliquis and Lipitor 40 mg daily.  He also denies having any significant episodes of dizziness/lightheadedness or syncope.  He brought his blood pressure readings from home and most days his systolic blood pressure is elevated (over 150).  He takes fludrocortisone 0.1 mg only as needed for lightheadedness or if his blood pressure is too low.  Of note-I reviewed Dr. Kaplan note.    Last visit-patient's wife had called me a few weeks ago stating that patient was having spells with dizziness and lightheadedness on walking where he was turning pale and his blood pressure was dropping to the 90s/60s.  After that I had started him on fludrocortisone 0.1 mg daily 1-2 times a day which has really helped with his symptoms.  He denies having any syncopal episodes.  Also denies having any new strokelike symptoms.  Continues to take aspirin, Eliquis and Lipitor 80 mg.  His next appointment with Dr. Kaplan is in December.    Last visit-patient denies having any strokelike symptoms since his last visit. He had right carotid  angioplasty /stent placement in April and was placed on Plavix in addition to his Eliquis. He has been feeling less dizzy since the procedure. His blood pressure was also dropping down to low hence he stopped taking his amlodipine however some home readings are in the 150s. He also  continues to take Lipitor 80 mg daily. He had his Holter monitor which showed a 24 beat run of SVT as well as short episodes of atrial tachycardia but no A. fib.    Last visit-since the last visit patient denies having any new strokelike symptoms.  He continues to take aspirin along with Eliquis and Lipitor 80 mg.  He had his carotid Doppler which showed right ICA stenosis of over 70% and severe proximal left CCA stenosis with total occlusion of left ICA.  He also had a CT angiogram of the head and neck that continues to show 50% right ICA stenosis as well as occluded left CCA and proximal left ICA along its entire cervical segment, with reconstitution of the petrous segment.  The previously seen distal left MCA stenosis was not noted at this time.  He also had a Holter monitor the results of which are still pending.  His blood pressure is much better after he cut back on his amlodipine.  Most readings were between 1 10-1 30.    Initial ylibi-12-sy[age]-old male accompanied by his girlfriend with a history of hypertension, hyperlipidemia, laryngeal cancer status post tracheostomy in 1998 (uses a voice assist device), legally blind in left eye due to eye injury presents as a hospital follow-up for stroke.  Patient states that he had difficulty getting his words out on 9/29.  He was brought to the hospital where an initial CT scan of the head edema in the left frontal region. After that he had a CT angiogram of the head which showed 50 to 60% of the left cavernous carotid stenosis and superior MCA division occlusion and a CT angiogram of the neck that showed near occlusion of the mid left common carotid artery as well as possible occlusion of the left ICA from the level of the mid left ICA to the proximal petrous segment.  Also 50 to 75% stenosis of the right carotid bulb and narrowing of right ICA to between 50 to 60% noted as well as moderate right mid vertebral stenosis.  CT perfusion showed focal ischemia in the left  frontotemporal region.  MRI brain showed an area of restricted diffusion in the left frontotemporal region including the left basal ganglia and insular cortex.  There was some question of whether the patient was taking Xarelto however patient denies that and states that his mother was on Xarelto not him.  He does not have a known history of atrial fibrillation.  Was started on Eliquis 5 mg twice a day (for presumable cardio embolic source) and aspirin 81 mg daily in addition to Lipitor 80 mg daily.  His lipid panel was as follows-, TG 96, LDL 65, HDL 37.  A1c was 5.6.  He states that his speech problems have now subsided.  Of note-I personally reviewed his CT angiogram of the head and neck and his MRI brain as well as 's note    The following portions of the patient's history were reviewed today and updated as of 12/15/2020  : allergies, current medications, past family history, past medical history, past social history, past surgical history and problem list  These document will be scanned to patient's chart.      Current Outpatient Medications:   •  apixaban (ELIQUIS) 5 MG tablet tablet, Take 1 tablet by mouth Every 12 (Twelve) Hours. Indications: stroke, Disp: 60 tablet, Rfl: 3  •  aspirin 81 MG chewable tablet, Chew 1 tablet Daily., Disp: 30 tablet, Rfl: 5  •  atorvastatin (Lipitor) 40 MG tablet, Take 1 tablet by mouth Daily., Disp: 30 tablet, Rfl: 11  •  bimatoprost (LUMIGAN) 0.01 % ophthalmic drops, Administer 1 drop into the left eye Every Night., Disp: , Rfl:   •  brimonidine-timolol (COMBIGAN) 0.2-0.5 % ophthalmic solution, Administer 1 drop into the left eye Every 12 (Twelve) Hours., Disp: , Rfl:   •  buprenorphine-naloxone (SUBOXONE) 8-2 MG per SL tablet, Place 2 tablets under the tongue Daily., Disp: , Rfl:   •  dorzolamide-timolol (COSOPT) 22.3-6.8 MG/ML ophthalmic solution, Administer 1 drop into the left eye 2 (Two) Times a Day., Disp: , Rfl:   •  fludrocortisone 0.1 MG tablet, TAKE  1 TO 2 TABLETS BY MOUTH DAILY WITH BREAKFAST. AVOID LYING DOWN FOR 2 HOURS AFTER TAKING THE MEDICATION TO PREVENT SUPINE HIGH BLOOD PRESSURE, Disp: 60 tablet, Rfl: 3  •  HYDROcodone-acetaminophen (HYCET) 7.5-325 MG/15ML solution, TAKE 10-12ML BY MOUTH EVERY 6 TO 8 HOURS, Disp: , Rfl:   •  ibuprofen (ADVIL,MOTRIN) 800 MG tablet, , Disp: , Rfl:   •  levothyroxine (SYNTHROID, LEVOTHROID) 137 MCG tablet, Take 137 mcg by mouth Daily., Disp: , Rfl:    Past Medical History:   Diagnosis Date   • Carotid artery occlusion     Left carotid occlusion   • Carotid stenosis, asymptomatic, right 04/23/2021    Status post right carotid stent   • Disease of thyroid gland    • Hyperlipidemia    • Hypertension    • Laryngeal cancer (HCC)       Past Surgical History:   Procedure Laterality Date   • CAROTID STENT Bilateral 4/23/2021    Procedure: Carotid Stent;  Surgeon: Anjel Covarrubias MD;  Location:  [facility];  Service: Interventional Radiology;  Laterality: Bilateral;   • ESOPHAGEAL DILATATION      EVERY 3-4 MONTHS   • INTERVENTIONAL RADIOLOGY PROCEDURE N/A 9/29/2020    Procedure: IR MECHANICAL THROMBECTOMY - PRIMARY;  Surgeon: Aidan lPasencia MD;  Location:  [facility];  Service: Interventional Radiology;  Laterality: N/A;   • MANDIBLE SURGERY     • TRACHEOSTOMY        Family History   Problem Relation Age of Onset   • No Known Problems Mother    • No Known Problems Father    • No Known Problems Sister    • No Known Problems Brother    • No Known Problems Maternal Aunt    • No Known Problems Maternal Uncle    • No Known Problems Paternal Aunt    • No Known Problems Paternal Uncle    • No Known Problems Maternal Grandmother    • No Known Problems Maternal Grandfather    • No Known Problems Paternal Grandmother    • No Known Problems Paternal Grandfather    • No Known Problems Other    • Ataxia Neg Hx    • Chorea Neg Hx    • Dementia Neg Hx    • Mental retardation Neg Hx    • Migraines  "Neg Hx    • Multiple sclerosis Neg Hx    • Neurofibromatosis Neg Hx    • Neuropathy Neg Hx    • Parkinsonism Neg Hx    • Seizures Neg Hx    • Stroke Neg Hx       Social History     Socioeconomic History   • Marital status:    Tobacco Use   • Smoking status: Former Smoker   • Smokeless tobacco: Never Used   Vaping Use   • Vaping Use: Never used   Substance and Sexual Activity   • Alcohol use: Not Currently   • Drug use: Never   • Sexual activity: Yes     Partners: Female     Review of Systems   Neurological: Positive for speech difficulty.   All other systems reviewed and are negative.      Objective:    /90   Pulse 73   Ht 167.6 cm (66\")   Wt 87.1 kg (192 lb)   SpO2 100%   BMI 30.99 kg/m²     Neurology Exam:    General apperance: NAD.     Mental status: Alert, awake and oriented to time place and person.    Recent and Remote memory: Intact.    Attention span and Concentration: Normal.     Language and Speech: Intact- moderate dysarthria.  Using a voice assist device to speak    Fluency, Naming , Repitition and Comprehension:  Intact    Cranial Nerves:   CN II: Visual fields Intact in right eye.  He is legally blind in left eye.  Pupils - CHANTEL  CN III, IV and VI: Extraocular movements are intact in right eye.  He could not AB duct his left eye.    CN V: Facial sensation is intact.   CN VII: Muscles of facial expression reveal no asymmetry. Intact.   CN VIII: Hearing is intact. Whispered voice intact.   CN IX and X: Palate elevates symmetrically. Intact  CN XI: Shoulder shrug is intact.   CN XII: Tongue is midline without evidence of atrophy or fasciculation.     Ophthalmoscopic exam of optic disc-poorly visualized    Motor:  Right UE muscle strength 5/5. Normal tone.     Left UE muscle strength 5/5. Normal tone.      Right LE muscle strength5/5. Normal tone.     Left LE muscle strength 5/5. Normal tone.      Sensory: Normal light touch, vibration and pinprick sensation bilaterally.    DTRs: 2+ " bilaterally in upper and lower extremities.    Babinski: Negative bilaterally.    Co-ordination: Normal finger-to-nose, heel to shin B/L.    Rhomberg: Negative.    Gait: Normal.    Cardiovascular: Regular rate and rhythm without murmur, gallop or rub.    Assessment and Plan:  1. Sequelae, post-stroke  Patient had a left MCA stroke which could have been due to artery to artery embolus secondary to left ICA occlusion (acute versus chronic) with emboli to the superior branch of left MCA versus a cardioembolic phenomenon  He had a right ICA carotid stent for asymptomatic severe stenosis.  Repeat carotid Doppler shows elevated right CCA/ICA velocities.  Neurosurgery has ordered a follow-up Doppler in 3 months  I will also refer him to cardiology to see if he would be a candidate for loop recorder placement.  His Holter monitor showed SVT and atrial tachycardia  He should continue aspirin along with Eliquis 5 mg twice daily for now.   I have asked him to continue Lipitor 40 mg LDL of less than 100.  His LDL was 65  -Goal blood pressure less than 140/90.  His systolic blood pressure is running high most days and I have told him to speak to his PCP about restarting him back on a low-dose of her blood pressure medication     2.  Dizziness  Most likely due to orthostatic hypotension  He can continue fludrocortisone 0.1 mg only as needed for dizziness and blood pressure of less than 110/60 and not every day as his blood pressure runs high.  I have also counseled him to keep himself well-hydrated and start wearing above-knee moderate compression stockings    Return in about 6 months (around 8/14/2022).       Tyra Barakat MD

## 2022-02-18 ENCOUNTER — APPOINTMENT (OUTPATIENT)
Dept: PREADMISSION TESTING | Facility: HOSPITAL | Age: 67
End: 2022-02-18

## 2022-02-18 LAB — SARS-COV-2 RNA PNL SPEC NAA+PROBE: NOT DETECTED

## 2022-02-18 PROCEDURE — C9803 HOPD COVID-19 SPEC COLLECT: HCPCS

## 2022-02-18 PROCEDURE — U0004 COV-19 TEST NON-CDC HGH THRU: HCPCS

## 2022-02-23 NOTE — PROGRESS NOTES
[facility]  [facility]    Chief Complaint  Establish Care (post CVA)    Subjective    History of Present Illness {CC  Problem List  Visit  Diagnosis   Encounters  Notes  Medications  Labs  Result Review Imaging  Media :23}     Patrick Shay is a 66 y.o. male with Carotid artery stenosis status post right carotid stent 4/2021, history of CVA, hypertension, hyperlipidemia, history of laryngeal cancer with complete laryngectomy who presents today for evaluation of atrial fibrillation at the request of Dr. Barakat. Patient had a stroke in September 2020.  He was started on Eliquis for secondary prevention as well as aspirin.  He was found to have a chronic left ICA occlusion and high-grade right carotid artery stenosis and underwent right cervical carotid angioplasty/stent placement by Dr. Covarrubias in April 2021. He wore a 2-week Holter monitor in January 2021 which showed a 24 beat run of SVT and some brief episodes of atrial tachycardia.He was sent here for high suspicion of atrial fibrillation and more extended monitoring, possible loop placement.  He denies any palpitations.  Recently has been having trouble with labile blood pressures.  He reports he started having episodes of hypotension since his stroke.  He currently is on Florinef, which he takes as needed.  He is also taking half of an amlodipine in the morning and half of the lisinopril at night, but only as needed.  The lisinopril 20 mg was recently added approximately 10 days ago by his PCP.  He took 1/2 dose of this one night and his systolic blood pressure dropped to 74/54.  He then took a Florinef and proceeded not to take blood pressure pills until about 24 hours later when his blood pressure reached 188 systolic.  He has never taken his antihypertensives on a regular basis due to hypotension.  He reports he does not mind taking blood pressure medications, however, he cannot tolerate the hypotensive effects which  "often include symptoms of near syncope.  He reports that he is always had a history of whitecoat hypertension with systolic blood pressures always over 200s at doctor's offices.  He denies any cardiac history.  Has never seen a cardiologist.  He denies any chest pain or shortness of breath.        Objective     Vital Signs:   Vitals:    02/24/22 1227 02/24/22 1230 02/24/22 1231 02/24/22 1337   BP: (!) 235/102 (!) 203/93 (!) 219/97 150/71   BP Location: Right arm Left arm Left arm Left arm   Patient Position: Sitting Standing Sitting Sitting   Cuff Size: Adult Adult Adult Adult   Pulse: 77 83 73 70   Resp:   20    Temp: 98.3 °F (36.8 °C) 98.2 °F (36.8 °C) 98.2 °F (36.8 °C)    TempSrc: Temporal Temporal Temporal    SpO2: 100% 100% 100% 100%   Weight:   88.1 kg (194 lb 2 oz)    Height:   167.6 cm (66\")      Body mass index is 31.33 kg/m².  Physical Exam  Vitals reviewed.   Constitutional:       Appearance: Normal appearance.   HENT:      Head: Normocephalic.   Neck:      Vascular: Carotid bruit (BILATERAL) present.   Cardiovascular:      Rate and Rhythm: Normal rate and regular rhythm.      Pulses: Normal pulses.      Heart sounds: Normal heart sounds, S1 normal and S2 normal. No murmur heard.      Pulmonary:      Effort: Pulmonary effort is normal. No respiratory distress.      Breath sounds: Normal breath sounds.   Chest:      Chest wall: No tenderness.   Abdominal:      General: Abdomen is flat.      Palpations: Abdomen is soft.   Musculoskeletal:      Cervical back: Neck supple.      Right lower leg: No edema.      Left lower leg: No edema.   Skin:     General: Skin is warm and dry.   Neurological:      General: No focal deficit present.      Mental Status: He is alert and oriented to person, place, and time. Mental status is at baseline.   Psychiatric:         Mood and Affect: Mood normal.         Behavior: Behavior normal.         Thought Content: Thought content normal.              Result Review  Data " Reviewed:{ Labs  Result Review  Imaging  Med Tab  Media :23}   Holter Monitor - 72 Hour Up To 21 Days (01/19/2021 10:42)  Adult Transthoracic Echo Complete W/ Cont if Necessary Per Protocol (With Agitated Saline) (09/30/2020 09:50)  Duplex Carotid Ultrasound CAR (01/26/2021 15:43)    Consultant notes Dr. Barakat            Assessment and Plan {CC Problem List  Visit Diagnosis  ROS  Review (Popup)  Health Maintenance  Quality  BestPractice  Medications  SmartSets  SnapShot Encounters  Media :23}   1. Labile hypertension  Clonidine 0.1 mg given in office today with appropriate reduction in systolic blood pressure.  Reports long history of whitecoat hypertension.  Reviewed blood pressure log.  Recommend taking Florinef only as needed for systolic blood pressure 100 or less.  Recommend stopping lisinopril for now due to profound hypotensive affect when only taking 10 mg  Take amlodipine 2.5 mg every morning.  Take an extra 2.5 mg as needed for systolic blood pressure greater than 150  His wife is a CNA and she is going to also check his blood pressure manually to make sure that her blood pressure cuff is accurate  - Ambulatory Referral to Cardiology    2. Cerebrovascular accident (CVA), unspecified mechanism (HCC)  We discussed doing loop recorder versus 30-day monitor.  Since his last Holter evaluation with over a year ago, we opted to go ahead and place him in a 30-day MCOT.  We will refer to cardiology and if no arrhythmias found, they can consider loop recorder if they feel appropriate  - Mobile Cardiac Outpatient Telemetry; Future  - Ambulatory Referral to Cardiology    3. Paroxysmal SVT (supraventricular tachycardia) (HCC)  Brief PAT/SVT noted on previous Holter monitor.  He was asymptomatic with this  - Ambulatory Referral to Cardiology    4. Near syncope    - Mobile Cardiac Outpatient Telemetry; Future  - Ambulatory Referral to Cardiology      Patient to follow up with PCP next week and bring BP  log.  Advised to call me if having any further issues. Follow up with cardiology in 6 weeks    Follow Up {Instructions Charge Capture  Follow-up Communications :23}   Return if symptoms worsen or fail to improve.    Patient was given instructions and counseling regarding his condition or for health maintenance advice. Please see specific information pulled into the AVS if appropriate.  Advised to call the [facility] with any questions, concerns, or worsening symptoms.

## 2022-02-24 ENCOUNTER — OFFICE VISIT (OUTPATIENT)
Dept: CARDIOLOGY | Facility: HOSPITAL | Age: 67
End: 2022-02-24

## 2022-02-24 ENCOUNTER — HOSPITAL ENCOUNTER (OUTPATIENT)
Dept: CARDIOLOGY | Facility: HOSPITAL | Age: 67
Discharge: HOME | End: 2022-02-24

## 2022-02-24 VITALS
DIASTOLIC BLOOD PRESSURE: 71 MMHG | HEART RATE: 70 BPM | BODY MASS INDEX: 31.2 KG/M2 | HEIGHT: 66 IN | TEMPERATURE: 98.2 F | WEIGHT: 194.13 LBS | OXYGEN SATURATION: 100 % | SYSTOLIC BLOOD PRESSURE: 150 MMHG | RESPIRATION RATE: 20 BRPM

## 2022-02-24 DIAGNOSIS — R55 NEAR SYNCOPE: ICD-10-CM

## 2022-02-24 DIAGNOSIS — I63.9 CEREBROVASCULAR ACCIDENT (CVA), UNSPECIFIED MECHANISM: ICD-10-CM

## 2022-02-24 DIAGNOSIS — I63.9 CEREBROVASCULAR ACCIDENT (CVA), UNSPECIFIED MECHANISM: Primary | ICD-10-CM

## 2022-02-24 DIAGNOSIS — I47.1 PAROXYSMAL SVT (SUPRAVENTRICULAR TACHYCARDIA): ICD-10-CM

## 2022-02-24 DIAGNOSIS — R09.89 LABILE HYPERTENSION: ICD-10-CM

## 2022-02-24 PROCEDURE — 99214 OFFICE O/P EST MOD 30 MIN: CPT | Performed by: NURSE PRACTITIONER

## 2022-02-24 PROCEDURE — 93270 REMOTE 30 DAY ECG REV/REPORT: CPT

## 2022-02-24 RX ORDER — AMLODIPINE BESYLATE 5 MG/1
5 TABLET ORAL 2 TIMES DAILY
COMMUNITY
Start: 2022-02-15 | End: 2022-02-24 | Stop reason: SDUPTHER

## 2022-02-24 RX ORDER — AMLODIPINE BESYLATE 5 MG/1
2.5 TABLET ORAL DAILY
Status: ON HOLD
Start: 2022-02-24 | End: 2022-04-14

## 2022-02-24 RX ORDER — LISINOPRIL 20 MG/1
20 TABLET ORAL DAILY
COMMUNITY
Start: 2022-02-15 | End: 2022-02-24

## 2022-02-24 RX ORDER — FLUDROCORTISONE ACETATE 0.1 MG/1
TABLET ORAL
Qty: 60 TABLET | Refills: 3
Start: 2022-02-24 | End: 2022-10-25

## 2022-02-24 NOTE — PROGRESS NOTES
[facility] Heart Monitor Documentation    Patrick Shay  1955  [id number]  02/24/22      [] ZIO XT Patch  Model [id number] Prescribed for  Days    · Serial Number: (N + 9 Digits) N   · Apply-By Date on Box:   · USPS Tracking Number:   · USPS Tracking        [x] [employer] BodyGuardian MINI PLUS Mobile Cardiac Telemetry  Model [id number] Prescribed for 30 Days    · Serial Number: (BGM + 7 Digits) [id number]  · Shipped-By Date on Box: 02/14/2022  · UPS Tracking Number: [id number]  · UPS Tracking      [] [employer]ian MINI Holter Monitor  Model BGMINIEL Prescribed for  Days    · Serial Number: (7 Digits)   · Shipped-By Date on Box:   · UPS Tracking Number: [id number]  · UPS Tracking        This monitor was applied to the patient's chest and checked for proper functioning.  Mr. Patrick Shay was instructed in the proper use of this monitor.  He was given the opportunity to ask questions and left the office with the device 's instruction manual.    Xin Milner [facility], 13:19 EST, 02/24/22                  [facility] 8.8.2019

## 2022-03-14 ENCOUNTER — HOSPITAL ENCOUNTER (OUTPATIENT)
Dept: CARDIOLOGY | Facility: HOSPITAL | Age: 67
Discharge: HOME | End: 2022-03-14
Admitting: RADIOLOGY

## 2022-03-14 ENCOUNTER — OFFICE VISIT (OUTPATIENT)
Dept: NEUROSURGERY | Facility: CLINIC | Age: 67
End: 2022-03-14

## 2022-03-14 ENCOUNTER — PREP FOR SURGERY (OUTPATIENT)
Dept: OTHER | Facility: HOSPITAL | Age: 67
End: 2022-03-14

## 2022-03-14 VITALS — BODY MASS INDEX: 31.18 KG/M2 | WEIGHT: 194 LBS | HEIGHT: 66 IN

## 2022-03-14 VITALS
WEIGHT: 194 LBS | BODY MASS INDEX: 27.77 KG/M2 | HEIGHT: 70 IN | DIASTOLIC BLOOD PRESSURE: 80 MMHG | TEMPERATURE: 98.4 F | SYSTOLIC BLOOD PRESSURE: 160 MMHG

## 2022-03-14 DIAGNOSIS — I65.21 CAROTID STENOSIS, ASYMPTOMATIC, RIGHT: ICD-10-CM

## 2022-03-14 DIAGNOSIS — I65.21 CAROTID STENOSIS, ASYMPTOMATIC, RIGHT: Primary | ICD-10-CM

## 2022-03-14 LAB
BH CV ECHO MEAS - BSA(HAYCOCK): 2.1 M^2
BH CV ECHO MEAS - BSA: 2 M^2
BH CV ECHO MEAS - BZI_BMI: 31.3 KILOGRAMS/M^2
BH CV ECHO MEAS - BZI_METRIC_HEIGHT: 167.6 CM
BH CV ECHO MEAS - BZI_METRIC_WEIGHT: 88 KG
BH CV MID RIGHT ICA HIDDEN LRR: 1 CM
BH CV RIGHT CCA HIDDEN LRR: 1 CM/S
BH CV VAS CAROTID RIGHT DISTAL STENT EDV: 100.35 CM/S
BH CV VAS CAROTID RIGHT DISTAL STENT PSV: 197.97 CM/S
BH CV VAS CAROTID RIGHT DISTAL TO STENT NATIVE VESSEL E: 73 CM/S
BH CV VAS CAROTID RIGHT DISTAL TO STENT PSV: 193.31 CM/S
BH CV VAS CAROTID RIGHT MID STENT EDV: 80.27 CM/S
BH CV VAS CAROTID RIGHT MID STENT PSV: 220.8 CM/S
BH CV VAS CAROTID RIGHT PROXIMAL STENT EDV: 255.33 CM/S
BH CV VAS CAROTID RIGHT PROXIMAL STENT PSV: 673.91 CM/S
BH CV VAS CAROTID RIGHT STENT NATIVE VESSEL PROXIMAL EDV: 111.5 CM/S
BH CV VAS CAROTID RIGHT STENT NATIVE VESSEL PROXIMAL PS: 242.68 CM/S
BH CV XLRA MEAS LEFT DIST CCA EDV: 43.3 CM/SEC
BH CV XLRA MEAS LEFT DIST CCA PSV: 135.1 CM/SEC
BH CV XLRA MEAS LEFT MID CCA EDV: 174.67 CM/SEC
BH CV XLRA MEAS LEFT MID CCA PSV: 469.99 CM/SEC
BH CV XLRA MEAS LEFT PROX CCA EDV: 17.1 CM/SEC
BH CV XLRA MEAS LEFT PROX CCA PSV: 38.5 CM/SEC
BH CV XLRA MEAS LEFT PROX ECA EDV: 34.8 CM/SEC
BH CV XLRA MEAS LEFT PROX ECA PSV: 104.8 CM/SEC
BH CV XLRA MEAS LEFT PROX SCLA EDV: 7.7 CM/SEC
BH CV XLRA MEAS LEFT PROX SCLA PSV: 335.8 CM/SEC
BH CV XLRA MEAS LEFT VERTEBRAL A EDV: 34.5 CM/SEC
BH CV XLRA MEAS LEFT VERTEBRAL A PSV: 83.7 CM/SEC
BH CV XLRA MEAS RIGHT DIST CCA EDV: 255.33 CM/SEC
BH CV XLRA MEAS RIGHT DIST CCA PSV: 673.91 CM/SEC
BH CV XLRA MEAS RIGHT DIST ICA EDV: 75.7 CM/SEC
BH CV XLRA MEAS RIGHT DIST ICA PSV: 190.6 CM/SEC
BH CV XLRA MEAS RIGHT ICA/CCA RATIO: 1.3
BH CV XLRA MEAS RIGHT MID CCA EDV: 111.5 CM/SEC
BH CV XLRA MEAS RIGHT MID CCA PSV: 242.7 CM/SEC
BH CV XLRA MEAS RIGHT MID ICA EDV: 100.35 CM/SEC
BH CV XLRA MEAS RIGHT MID ICA PSV: 197.97 CM/SEC
BH CV XLRA MEAS RIGHT PROX CCA EDV: 92.8 CM/SEC
BH CV XLRA MEAS RIGHT PROX CCA PSV: 174 CM/SEC
BH CV XLRA MEAS RIGHT PROX ECA EDV: 181.3 CM/SEC
BH CV XLRA MEAS RIGHT PROX ECA PSV: 415.5 CM/SEC
BH CV XLRA MEAS RIGHT PROX ICA EDV: 80.27 CM/SEC
BH CV XLRA MEAS RIGHT PROX ICA PSV: 220.81 CM/SEC
BH CV XLRA MEAS RIGHT PROX SCLA EDV: 14.4 CM/SEC
BH CV XLRA MEAS RIGHT PROX SCLA PSV: 280 CM/SEC
BH CV XLRA MEAS RIGHT VERTEBRAL A EDV: 29.2 CM/SEC
BH CV XLRA MEAS RIGHT VERTEBRAL A PSV: 61.6 CM/SEC
BH CVPROX RIGHT ICA HIDDEN LRR: 1 CM
LEFT ARM BP: NORMAL MMHG
RIGHT ARM BP: NORMAL MMHG

## 2022-03-14 PROCEDURE — 99214 OFFICE O/P EST MOD 30 MIN: CPT | Performed by: RADIOLOGY

## 2022-03-14 PROCEDURE — 93880 EXTRACRANIAL BILAT STUDY: CPT

## 2022-03-14 RX ORDER — SODIUM CHLORIDE 9 MG/ML
50 INJECTION, SOLUTION INTRAVENOUS CONTINUOUS
Status: CANCELLED | OUTPATIENT
Start: 2022-03-14

## 2022-03-14 RX ORDER — SODIUM CHLORIDE 0.9 % (FLUSH) 0.9 %
10 SYRINGE (ML) INJECTION EVERY 12 HOURS SCHEDULED
Status: CANCELLED | OUTPATIENT
Start: 2022-03-14

## 2022-03-14 RX ORDER — SODIUM CHLORIDE 0.9 % (FLUSH) 0.9 %
1-10 SYRINGE (ML) INJECTION AS NEEDED
Status: CANCELLED | OUTPATIENT
Start: 2022-03-14

## 2022-03-14 NOTE — PROGRESS NOTES
"NAME: SHAINA PICHARDO   DOS: 3/14/2022  : 1955  PCP: Ernie Hsieh MD    Chief Complaint:    Chief Complaint   Patient presents with   • Carotid Artery Disease     Chronic left ICA occlusion, right carotid stenosis s/p stent placement       History of Present Illness:  66 y.o. male who is well-known to the neuro interventional service, having been previously seen in consultation regarding left carotid occlusion and right carotid stenosis.  He initially presented to [facility] in 2020 with left MCA stroke secondary to left carotid occlusion, presenting with some alteration of speech and facial droop.  He has made a significant recovery since that time, and essentially returned to his neurologic baseline.  He does have a history of laryngeal cancer and is status post prior surgery/radiation therapy, but communicates very well with a vocal assist device.     In 2021, carotid duplex demonstrated significantly increased velocities within the right carotid vasculature concerning for high-grade right carotid stenosis.  Given his contralateral carotid occlusion combined with prior surgery/radiation to the neck, he was deemed a \"high surgical risk\", and his right carotid stenosis was treated with angioplasty/stent placement on 2021.     Since his procedure in April, he continues to have elevated velocities in the right carotid vasculature, and it is difficult to know for certain whether this represents compensatory increased flow or recurrent disease.  He has done well since I saw him last, and remains asymptomatic, denying any recurrent/new stroke or TIA-like symptoms.  He remains on Eliquis/aspirin regimen.  He presents today for follow-up of his right carotid stenosis.    Past Medical History:  Past Medical History:   Diagnosis Date   • Carotid artery occlusion     Left carotid occlusion   • Carotid stenosis, asymptomatic, right 2021    Status post right carotid " stent   • Disease of thyroid gland    • Hyperlipidemia    • Hypertension    • Laryngeal cancer (HCC)        Past Surgical History:  Past Surgical History:   Procedure Laterality Date   • CAROTID STENT Bilateral 4/23/2021    Procedure: Carotid Stent;  Surgeon: Anjel Covarrubias MD;  Location:  [facility];  Service: Interventional Radiology;  Laterality: Bilateral;   • ESOPHAGEAL DILATATION      EVERY 3-4 MONTHS   • INTERVENTIONAL RADIOLOGY PROCEDURE N/A 9/29/2020    Procedure: IR MECHANICAL THROMBECTOMY - PRIMARY;  Surgeon: Aidan Plasencia MD;  Location:  [facility];  Service: Interventional Radiology;  Laterality: N/A;   • MANDIBLE SURGERY     • TRACHEOSTOMY         Review of Systems:        Review of Systems   Constitutional: Negative for activity change, appetite change, chills, diaphoresis, fatigue, fever and unexpected weight change.   HENT: Negative for congestion, dental problem, drooling, ear discharge, ear pain, facial swelling, hearing loss, mouth sores, nosebleeds, postnasal drip, rhinorrhea, sinus pressure, sinus pain, sneezing, sore throat, tinnitus, trouble swallowing and voice change.    Eyes: Negative for photophobia, pain, discharge, redness, itching and visual disturbance.   Respiratory: Negative for apnea, cough, choking, chest tightness, shortness of breath, wheezing and stridor.    Cardiovascular: Negative for chest pain, palpitations and leg swelling.   Gastrointestinal: Negative for abdominal distention, abdominal pain, anal bleeding, blood in stool, constipation, diarrhea, nausea, rectal pain and vomiting.   Endocrine: Negative for cold intolerance, heat intolerance, polydipsia, polyphagia and polyuria.   Genitourinary: Negative for decreased urine volume, difficulty urinating, dysuria, enuresis, flank pain, frequency, genital sores, hematuria and urgency.   Musculoskeletal: Negative for arthralgias, back pain, gait problem, joint swelling,  myalgias, neck pain and neck stiffness.   Skin: Negative for color change, pallor, rash and wound.   Allergic/Immunologic: Negative for environmental allergies, food allergies and immunocompromised state.   Neurological: Negative for dizziness, tremors, seizures, syncope, facial asymmetry, speech difficulty, weakness, light-headedness, numbness and headaches.   Hematological: Negative for adenopathy. Does not bruise/bleed easily.   Psychiatric/Behavioral: Negative for agitation, behavioral problems, confusion, decreased concentration, dysphoric mood, hallucinations, self-injury, sleep disturbance and suicidal ideas. The patient is not nervous/anxious and is not hyperactive.         Medications    Current Outpatient Medications:   •  amLODIPine (NORVASC) 5 MG tablet, Take 0.5 tablets by mouth Daily. May take extra 1/2 tablet as needed for SBP >150, Disp: , Rfl:   •  apixaban (ELIQUIS) 5 MG tablet tablet, Take 1 tablet by mouth Every 12 (Twelve) Hours. Indications: stroke, Disp: 60 tablet, Rfl: 3  •  aspirin 81 MG chewable tablet, Chew 1 tablet Daily., Disp: 30 tablet, Rfl: 5  •  atorvastatin (Lipitor) 40 MG tablet, Take 1 tablet by mouth Daily., Disp: 30 tablet, Rfl: 11  •  bimatoprost (LUMIGAN) 0.01 % ophthalmic drops, Administer 1 drop into the left eye Every Night., Disp: , Rfl:   •  brimonidine-timolol (COMBIGAN) 0.2-0.5 % ophthalmic solution, Administer 1 drop into the left eye Every 12 (Twelve) Hours., Disp: , Rfl:   •  buprenorphine-naloxone (SUBOXONE) 8-2 MG per SL tablet, Place 2 tablets under the tongue Daily., Disp: , Rfl:   •  dorzolamide-timolol (COSOPT) 22.3-6.8 MG/ML ophthalmic solution, Administer 1 drop into the left eye 2 (Two) Times a Day., Disp: , Rfl:   •  fludrocortisone 0.1 MG tablet, TAKE 1 TO 2 TABLETS BY MOUTH AS NEEDED FOR SBP<100. AVOID LYING DOWN FOR 2 HOURS AFTER TAKING THE MEDICATION TO PREVENT SUPINE HIGH BLOOD PRESSURE, Disp: 60 tablet, Rfl: 3  •  HYDROcodone-acetaminophen (HYCET)  7.5-325 MG/15ML solution, Take 15 mL by mouth Every 4 (Four) Hours As Needed., Disp: , Rfl:   •  ibuprofen (ADVIL,MOTRIN) 800 MG tablet, Take 800 mg by mouth Every 6 (Six) Hours As Needed., Disp: , Rfl:   •  levothyroxine (SYNTHROID, LEVOTHROID) 137 MCG tablet, Take 137 mcg by mouth Daily., Disp: , Rfl:     Allergies:  No Known Allergies    Social Hx:  Social History     Tobacco Use   • Smoking status: Former Smoker   • Smokeless tobacco: Never Used   Vaping Use   • Vaping Use: Never used   Substance Use Topics   • Alcohol use: Not Currently   • Drug use: Never       Family Hx:  Family History   Problem Relation Age of Onset   • Atrial fibrillation Mother    • Cancer Father    • Cancer Maternal Grandmother    • Throat cancer Maternal Grandmother    • Cancer Maternal Grandfather    • Heart attack Paternal Grandmother    • No Known Problems Paternal Grandfather    • No Known Problems Other    • Ataxia Neg Hx    • Chorea Neg Hx    • Dementia Neg Hx    • Mental retardation Neg Hx    • Migraines Neg Hx    • Multiple sclerosis Neg Hx    • Neurofibromatosis Neg Hx    • Neuropathy Neg Hx    • Parkinsonism Neg Hx    • Seizures Neg Hx    • Stroke Neg Hx        Review of Imaging:  Carotid duplex dated 3/14/2022 from [facility] was reviewed along with its corresponding radiologic report.  Comparison is made to prior study from 12/1/2021.  Again noted is chronic occlusion of the left internal carotid artery.  There has been further increase in velocities throughout the right carotid vasculature, but most notably involving the proximal aspects of the stent construct within the common carotid artery.  Peak velocities within the right carotid vasculature are 674/255 cm/s, but with an ICA/CCA ratio of 1.3 (was 521/193 cm/s, ratio 1.3).      Physical Examination:  Vitals:    03/14/22 1452   BP: 160/80   Temp: 98.4 °F (36.9 °C)        General Appearance:   Well developed, well nourished, well groomed, alert, and  cooperative.  Cardiovascular: Regular rate and rhythm. No carotid bruits      Neurological examination:  Neurologic Exam      Mental Status   Oriented to person, place, and time.   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     CN III, IV, VI   Extraocular motions are normal.      CN V   Facial sensation intact.      CN VII   Facial expression full, symmetric.      CN VIII   CN VIII normal.   Postsurgical changes related to bilateral radical neck dissection.      Motor Exam      Strength   Strength 5/5 throughout.      Sensory Exam   Light touch normal.      Gait, Coordination, and Reflexes      Gait  Gait: normal       Diagnoses/Plan:    Mr. Shay is a 66 y.o. male status post right carotid angioplasty/stent placement on 4/23/2021.  He does have a history of left carotid occlusion, as well as postsurgical and radiation changes to the bilateral neck for laryngeal cancer.  He is maintained on a chronic Eliquis/aspirin regimen, and denies any stroke or TIA-like symptoms.  However, carotid duplex on 3/14/2022 demonstrates further increase in velocities within the right carotid vasculature.  While this may represent compensatory increase in flow with contralateral carotid occlusion, the progression is certainly concerning for recurrent stenosis.  Given his contralateral carotid occlusion, I think it prudent to perform a diagnostic angiogram to definitively evaluate the severity of his right carotid disease.  If there is a recurrent high-grade stenosis, then we will plan on further intervention that day with additional angioplasty and/or stent.  I discussed this in detail with Mr. Shay and family, and they are in agreement with this treatment plan.  They do have a trip scheduled in the next week or so, and  would like to defer the angiogram and/or intervention until the first week of April, and I think this is certainly reasonable.  He will remain on his Eliquis/aspirin regimen during the interim.  Ideally we will perform his angiogram and/or intervention via right radial access.

## 2022-04-05 ENCOUNTER — TRANSCRIBE ORDERS (OUTPATIENT)
Dept: ADMINISTRATIVE | Facility: HOSPITAL | Age: 67
End: 2022-04-05

## 2022-04-05 DIAGNOSIS — Z11.59 SPECIAL SCREENING EXAMINATION FOR VIRAL DISEASE: Primary | ICD-10-CM

## 2022-04-08 ENCOUNTER — LAB (OUTPATIENT)
Dept: PREADMISSION TESTING | Facility: HOSPITAL | Age: 67
End: 2022-04-08

## 2022-04-08 ENCOUNTER — PREP FOR SURGERY (OUTPATIENT)
Dept: OTHER | Facility: HOSPITAL | Age: 67
End: 2022-04-08

## 2022-04-08 DIAGNOSIS — I65.21 CAROTID STENOSIS, ASYMPTOMATIC, RIGHT: ICD-10-CM

## 2022-04-08 DIAGNOSIS — I65.21 CAROTID STENOSIS, ASYMPTOMATIC, RIGHT: Primary | ICD-10-CM

## 2022-04-08 LAB — SARS-COV-2 RNA PNL SPEC NAA+PROBE: NOT DETECTED

## 2022-04-08 PROCEDURE — C9803 HOPD COVID-19 SPEC COLLECT: HCPCS

## 2022-04-08 PROCEDURE — U0004 COV-19 TEST NON-CDC HGH THRU: HCPCS

## 2022-04-11 ENCOUNTER — LAB (OUTPATIENT)
Dept: PREADMISSION TESTING | Facility: HOSPITAL | Age: 67
End: 2022-04-11

## 2022-04-11 DIAGNOSIS — I65.21 CAROTID STENOSIS, ASYMPTOMATIC, RIGHT: ICD-10-CM

## 2022-04-11 LAB — SARS-COV-2 RNA PNL SPEC NAA+PROBE: NOT DETECTED

## 2022-04-11 PROCEDURE — C9803 HOPD COVID-19 SPEC COLLECT: HCPCS

## 2022-04-11 PROCEDURE — U0004 COV-19 TEST NON-CDC HGH THRU: HCPCS

## 2022-04-14 ENCOUNTER — HOSPITAL ENCOUNTER (OUTPATIENT)
Facility: HOSPITAL | Age: 67
Setting detail: HOSPITAL OUTPATIENT SURGERY
Discharge: HOME | End: 2022-04-14
Attending: RADIOLOGY | Admitting: RADIOLOGY

## 2022-04-14 VITALS
RESPIRATION RATE: 17 BRPM | BODY MASS INDEX: 28.96 KG/M2 | SYSTOLIC BLOOD PRESSURE: 143 MMHG | HEIGHT: 69 IN | TEMPERATURE: 97.7 F | HEART RATE: 73 BPM | OXYGEN SATURATION: 94 % | WEIGHT: 195.55 LBS | DIASTOLIC BLOOD PRESSURE: 70 MMHG

## 2022-04-14 DIAGNOSIS — I65.21 CAROTID STENOSIS, ASYMPTOMATIC, RIGHT: ICD-10-CM

## 2022-04-14 LAB
ANION GAP SERPL CALCULATED.3IONS-SCNC: 9 MMOL/L (ref 5–15)
BUN SERPL-MCNC: 12 MG/DL (ref 8–23)
BUN/CREAT SERPL: 9.9 (ref 7–25)
CALCIUM SPEC-SCNC: 8.8 MG/DL (ref 8.6–10.5)
CHLORIDE SERPL-SCNC: 107 MMOL/L (ref 98–107)
CO2 SERPL-SCNC: 26 MMOL/L (ref 22–29)
CREAT SERPL-MCNC: 1.21 MG/DL (ref 0.76–1.27)
DEPRECATED RDW RBC AUTO: 44.1 FL (ref 37–54)
EGFRCR SERPLBLD CKD-EPI 2021: 65.6 ML/MIN/1.73
ERYTHROCYTE [DISTWIDTH] IN BLOOD BY AUTOMATED COUNT: 16.1 % (ref 12.3–15.4)
GLUCOSE SERPL-MCNC: 107 MG/DL (ref 65–99)
HCT VFR BLD AUTO: 27.6 % (ref 37.5–51)
HGB BLD-MCNC: 8.2 G/DL (ref 13–17.7)
MCH RBC QN AUTO: 22.4 PG (ref 26.6–33)
MCHC RBC AUTO-ENTMCNC: 29.7 G/DL (ref 31.5–35.7)
MCV RBC AUTO: 75.4 FL (ref 79–97)
PLATELET # BLD AUTO: 169 10*3/MM3 (ref 140–450)
PMV BLD AUTO: 10.9 FL (ref 6–12)
POTASSIUM SERPL-SCNC: 4 MMOL/L (ref 3.5–5.2)
RBC # BLD AUTO: 3.66 10*6/MM3 (ref 4.14–5.8)
SODIUM SERPL-SCNC: 142 MMOL/L (ref 136–145)
WBC NRBC COR # BLD: 7.17 10*3/MM3 (ref 3.4–10.8)

## 2022-04-14 PROCEDURE — 25010000002 HEPARIN (PORCINE) PER 1000 UNITS: Performed by: RADIOLOGY

## 2022-04-14 PROCEDURE — S0260 H&P FOR SURGERY: HCPCS | Performed by: RADIOLOGY

## 2022-04-14 PROCEDURE — C1769 GUIDE WIRE: HCPCS | Performed by: RADIOLOGY

## 2022-04-14 PROCEDURE — 25010000002 FENTANYL CITRATE (PF) 50 MCG/ML SOLUTION: Performed by: RADIOLOGY

## 2022-04-14 PROCEDURE — 0 IODIXANOL PER 1 ML: Performed by: RADIOLOGY

## 2022-04-14 PROCEDURE — 80048 BASIC METABOLIC PNL TOTAL CA: CPT | Performed by: RADIOLOGY

## 2022-04-14 PROCEDURE — 85027 COMPLETE CBC AUTOMATED: CPT | Performed by: RADIOLOGY

## 2022-04-14 PROCEDURE — 36223 PLACE CATH CAROTID/INOM ART: CPT | Performed by: RADIOLOGY

## 2022-04-14 PROCEDURE — 25010000002 MIDAZOLAM PER 1 MG: Performed by: RADIOLOGY

## 2022-04-14 PROCEDURE — 99152 MOD SED SAME PHYS/QHP 5/>YRS: CPT | Performed by: RADIOLOGY

## 2022-04-14 RX ORDER — FENTANYL CITRATE 50 UG/ML
INJECTION, SOLUTION INTRAMUSCULAR; INTRAVENOUS AS NEEDED
Status: DISCONTINUED | OUTPATIENT
Start: 2022-04-14 | End: 2022-04-14 | Stop reason: HOSPADM

## 2022-04-14 RX ORDER — IODIXANOL 320 MG/ML
INJECTION, SOLUTION INTRAVASCULAR AS NEEDED
Status: DISCONTINUED | OUTPATIENT
Start: 2022-04-14 | End: 2022-04-14 | Stop reason: HOSPADM

## 2022-04-14 RX ORDER — LIDOCAINE HYDROCHLORIDE 10 MG/ML
INJECTION, SOLUTION EPIDURAL; INFILTRATION; INTRACAUDAL; PERINEURAL AS NEEDED
Status: DISCONTINUED | OUTPATIENT
Start: 2022-04-14 | End: 2022-04-14 | Stop reason: HOSPADM

## 2022-04-14 RX ORDER — SODIUM CHLORIDE 0.9 % (FLUSH) 0.9 %
10 SYRINGE (ML) INJECTION EVERY 12 HOURS SCHEDULED
Status: DISCONTINUED | OUTPATIENT
Start: 2022-04-14 | End: 2022-04-14 | Stop reason: HOSPADM

## 2022-04-14 RX ORDER — SODIUM CHLORIDE 0.9 % (FLUSH) 0.9 %
1-10 SYRINGE (ML) INJECTION AS NEEDED
Status: DISCONTINUED | OUTPATIENT
Start: 2022-04-14 | End: 2022-04-14 | Stop reason: HOSPADM

## 2022-04-14 RX ORDER — SODIUM CHLORIDE 9 MG/ML
50 INJECTION, SOLUTION INTRAVENOUS CONTINUOUS
Status: DISCONTINUED | OUTPATIENT
Start: 2022-04-14 | End: 2022-04-14 | Stop reason: HOSPADM

## 2022-04-14 RX ORDER — SODIUM CHLORIDE 0.9 % (FLUSH) 0.9 %
10 SYRINGE (ML) INJECTION AS NEEDED
Status: DISCONTINUED | OUTPATIENT
Start: 2022-04-14 | End: 2022-04-14 | Stop reason: HOSPADM

## 2022-04-14 RX ORDER — SODIUM CHLORIDE 9 MG/ML
75 INJECTION, SOLUTION INTRAVENOUS CONTINUOUS
Status: ACTIVE | OUTPATIENT
Start: 2022-04-14 | End: 2022-04-14

## 2022-04-14 RX ORDER — CLOPIDOGREL BISULFATE 75 MG/1
TABLET ORAL AS NEEDED
Status: DISCONTINUED | OUTPATIENT
Start: 2022-04-14 | End: 2022-04-14 | Stop reason: HOSPADM

## 2022-04-14 RX ORDER — MIDAZOLAM HYDROCHLORIDE 1 MG/ML
INJECTION INTRAMUSCULAR; INTRAVENOUS AS NEEDED
Status: DISCONTINUED | OUTPATIENT
Start: 2022-04-14 | End: 2022-04-14 | Stop reason: HOSPADM

## 2022-04-14 NOTE — H&P
"NAME: SHAINA PICHARDO  : 1955  PCP: Ernie Hsieh MD  Attending MD: Anjel Covarrubias MD    Date of Admission:  2022  Date of Service: 2022    History of Present Illness:  66 y.o.  male who is well-known to the neuro interventional service, having been previously seen in consultation regarding left carotid occlusion and right carotid stenosis.  He initially presented to [facility] in 2020 with left MCA stroke secondary to left carotid occlusion, presenting with some alteration of speech and facial droop.  He has made a significant recovery since that time, and essentially returned to his neurologic baseline.  He does have a history of laryngeal cancer and is status post prior surgery/radiation therapy, but communicates very well with a vocal assist device.     In 2021, carotid duplex demonstrated significantly increased velocities within the right carotid vasculature concerning for high-grade right carotid stenosis.  Given his contralateral carotid occlusion combined with prior surgery/radiation to the neck, he was deemed a \"high surgical risk\", and his right carotid stenosis was treated with angioplasty/stent placement on 2021.     Since his procedure in April, he continues to have elevated velocities in the right carotid vasculature, and it is difficult to know for certain whether this represents compensatory increased flow or recurrent disease.  He remains asymptomatic, denying any stroke or TIA-like symptoms.  He presents today for diagnostic angiography, as well as possible intervention for recurrent/high-grade carotid stenosis.     Past Medical History:  Past Medical History:   Diagnosis Date   • Carotid artery occlusion     Left carotid occlusion   • Carotid stenosis, asymptomatic, right 2021    Status post right carotid stent   • Disease of thyroid gland    • Hyperlipidemia    • Hypertension    • Laryngeal cancer (HCC)        Past Surgical " History:  Past Surgical History:   Procedure Laterality Date   • CAROTID STENT Bilateral 4/23/2021    Procedure: Carotid Stent;  Surgeon: Anjel Covarrubias MD;  Location:  [facility];  Service: Interventional Radiology;  Laterality: Bilateral;   • ESOPHAGEAL DILATATION      EVERY 3-4 MONTHS   • INTERVENTIONAL RADIOLOGY PROCEDURE N/A 9/29/2020    Procedure: IR MECHANICAL THROMBECTOMY - PRIMARY;  Surgeon: Aidan Plasencia MD;  Location:  [facility];  Service: Interventional Radiology;  Laterality: N/A;   • MANDIBLE SURGERY     • TRACHEOSTOMY           Medications  Medications Prior to Admission   Medication Sig Dispense Refill Last Dose   • amLODIPine (NORVASC) 5 MG tablet Take 0.5 tablets by mouth Daily. May take extra 1/2 tablet as needed for SBP >150      • apixaban (ELIQUIS) 5 MG tablet tablet Take 1 tablet by mouth Every 12 (Twelve) Hours. Indications: stroke 60 tablet 3    • aspirin 81 MG chewable tablet Chew 1 tablet Daily. 30 tablet 5    • atorvastatin (Lipitor) 40 MG tablet Take 1 tablet by mouth Daily. 30 tablet 11    • bimatoprost (LUMIGAN) 0.01 % ophthalmic drops Administer 1 drop into the left eye Every Night.      • brimonidine-timolol (COMBIGAN) 0.2-0.5 % ophthalmic solution Administer 1 drop into the left eye Every 12 (Twelve) Hours.      • buprenorphine-naloxone (SUBOXONE) 8-2 MG per SL tablet Place 2 tablets under the tongue Daily.      • dorzolamide-timolol (COSOPT) 22.3-6.8 MG/ML ophthalmic solution Administer 1 drop into the left eye 2 (Two) Times a Day.      • fludrocortisone 0.1 MG tablet TAKE 1 TO 2 TABLETS BY MOUTH AS NEEDED FOR SBP<100. AVOID LYING DOWN FOR 2 HOURS AFTER TAKING THE MEDICATION TO PREVENT SUPINE HIGH BLOOD PRESSURE 60 tablet 3    • HYDROcodone-acetaminophen (HYCET) 7.5-325 MG/15ML solution Take 15 mL by mouth Every 4 (Four) Hours As Needed.      • ibuprofen (ADVIL,MOTRIN) 800 MG tablet Take 800 mg by mouth Every 6 (Six) Hours As  Needed.      • levothyroxine (SYNTHROID, LEVOTHROID) 137 MCG tablet Take 137 mcg by mouth Daily.          Allergies:  No Known Allergies    Social Hx:  Social History     Socioeconomic History   • Marital status:    Tobacco Use   • Smoking status: Former Smoker   • Smokeless tobacco: Never Used   Vaping Use   • Vaping Use: Never used   Substance and Sexual Activity   • Alcohol use: Not Currently   • Drug use: Never   • Sexual activity: Yes     Partners: Female       Family Hx:  Family History   Problem Relation Age of Onset   • Atrial fibrillation Mother    • Cancer Father    • Cancer Maternal Grandmother    • Throat cancer Maternal Grandmother    • Cancer Maternal Grandfather    • Heart attack Paternal Grandmother    • No Known Problems Paternal Grandfather    • No Known Problems Other    • Ataxia Neg Hx    • Chorea Neg Hx    • Dementia Neg Hx    • Mental retardation Neg Hx    • Migraines Neg Hx    • Multiple sclerosis Neg Hx    • Neurofibromatosis Neg Hx    • Neuropathy Neg Hx    • Parkinsonism Neg Hx    • Seizures Neg Hx    • Stroke Neg Hx        Review of Imaging:  Carotid duplex dated 3/14/2022 from [facility] was reviewed along with its corresponding radiologic report.  Comparison is made to prior study from 12/1/2021.  Again noted is chronic occlusion of the left internal carotid artery.  There has been further increase in velocities throughout the right carotid vasculature, but most notably involving the proximal aspects of the stent construct within the common carotid artery.  Peak velocities within the right carotid vasculature are 674/255 cm/s, but with an ICA/CCA ratio of 1.3 (was 521/193 cm/s, ratio 1.3).    Laboratory Result:  Lab Results   Component Value Date    WBC 9.39 04/24/2021    HGB 12.3 (L) 04/24/2021    HCT 39.3 04/24/2021    MCV 88.7 04/24/2021     04/24/2021     Lab Results   Component Value Date    GLUCOSE 120 (H) 04/24/2021    CALCIUM 8.6 04/24/2021      04/24/2021    K 4.5 04/24/2021    CO2 25.0 04/24/2021     04/24/2021    BUN 11 04/24/2021    CREATININE 1.24 04/24/2021    EGFRIFAFRI 84 10/02/2020    EGFRIFNONA 58 (L) 04/24/2021    BCR 8.9 04/24/2021    ANIONGAP 9.0 04/24/2021     Lab Results   Component Value Date    HGBA1C 5.60 09/30/2020     Lab Results   Component Value Date    CHOL 121 09/30/2020    TRIG 96 09/30/2020    HDL 37 (L) 09/30/2020    LDL 65 09/30/2020       Physical Examination:  There were no vitals filed for this visit.     General Appearance:   Well developed, well nourished, well groomed, alert, and cooperative.  Cardiovascular: Regular rate and rhythm. No carotid bruits    Neurological examination:  Neurologic Exam      Mental Status   Oriented to person, place, and time.   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     CN III, IV, VI   Extraocular motions are normal.      CN V   Facial sensation intact.      CN VII   Facial expression full, symmetric.      CN VIII   CN VIII normal.   Postsurgical changes related to bilateral radical neck dissection.      Motor Exam      Strength   Strength 5/5 throughout.      Sensory Exam   Light touch normal.      Gait, Coordination, and Reflexes      Gait  Gait: normal    Diagnoses/Plan:    Mr. Shay is a 66 y.o. male with history of radiation/surgery to the neck as well as a laryngectomy.  He is also has a chronic left carotid occlusion.  He underwent angioplasty/stent placement for high-grade right carotid stenosis in April 2021.  While he remains asymptomatic, carotid duplexes demonstrate progressively increasing velocities within the right carotid vasculature concerning for recurrent/high-grade carotid stenosis.  He presents today for diagnostic angiography along with probable  intervention for his recurrent right carotid stenosis.

## 2022-05-02 PROCEDURE — 93272 ECG/REVIEW INTERPRET ONLY: CPT | Performed by: INTERNAL MEDICINE

## 2022-05-02 NOTE — PROGRESS NOTES
[facility] Cardiology  Consultation H&P  Patrick Sahy  1955  [address]  [place] [id number]     VISIT DATE:  05/04/22    PCP: Ernie Hsieh MD  [address] [id number]    IDENTIFICATION: A 66 y.o. male     PROBLEM LIST:    1. CVA  1. Presentation to [facility] on September 2020 with left MCA stroke secondary to left carotid occlusion -speech and facial droop.  2. 9/20 CT cerebral perfusion: Focal ischemia in left frontotemporal region no evidence of underlying core infarct.  Remaining imaging parameters suggest slow flow rate through most of traditional left MCA territory.  3. 9/20 MRI: Restricted diffusion of the left frontotemporal region including left basal ganglia and insular cortex reasons of acute infarction with minimal localized edema.  No midline shift nor mass-effect.  4. 9/20 patient was not a tPA candidate and percutaneous intervention was not feasible due to 100% occlusion of left ICA.  5. 9/20 2D echo: LVEF = 70%.  Trace MR.  2. Carotid artery disease  1. 9/20 CT angiogram neck: Occluded left ICA from the level of mid left ICA to proximal petrous segment.  Near occlusion of mid left common carotid artery.  Up to 50 to 75% stenosis of right carotid bulb, tortuous and narrowed right ICA between 50 and 60% stenosis.  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  Large right vertebral artery with moderate right mid vertebral stenosis in the neck.  Probably high-grade distal left subclavian stenosis incidentally noted.  2. 1/21 carotid US: Right ICA stenosis >70%.  Left ICA total equal.  3. 4/21 status post angioplasty/stent placement for high-grade right carotid stenosis Dr Covarrubias  4. 4/22 Carotid angiogram 30% SUSANA, known  LICA  3. Paroxysmal SVT  1. 1/21 14-day Holter monitor: AVG 69, min 47, max 139.  Abnormal Holter monitor.  24 beat run of SVT.  Other short episodes of atrial tachycardia.  No evidence of atrial  fibrillation  4. Near syncope/collapse  1. 2/22 ([facility]) MCOT: A. fib 0.0%.  Underlying mechanism NSR.  VE <1%.  SVE <1%.  Normal Holter monitor.  2. 9/20 echo basal septal hypertrophy nonobstructive gradient  5. Labile hypertension  1. Patient taking half of home p.o. antihypertensives and managing Florinef if appreciating hypotension  6. Hyperlipidemia  1. 9/20 121/96/37/65   7. GERD  8. Hypothyroidism  9. Surgical  1. History of laryngeal cancer status post laryngectomy and radiation  2. Esophageal dilation  3. Mandible surgery  4. Tracheostomy      CC:  Chief Complaint   Patient presents with   • Hypertension   • Stroke   • Rapid Heart Rate       Allergies  No Known Allergies    Current Medications    Current Outpatient Medications:   •  apixaban (ELIQUIS) 5 MG tablet tablet, Take 1 tablet by mouth Every 12 (Twelve) Hours. Indications: stroke, Disp: 60 tablet, Rfl: 3  •  aspirin 81 MG chewable tablet, Chew 1 tablet Daily., Disp: 30 tablet, Rfl: 5  •  atorvastatin (Lipitor) 40 MG tablet, Take 1 tablet by mouth Daily., Disp: 30 tablet, Rfl: 11  •  buprenorphine-naloxone (SUBOXONE) 8-2 MG per SL tablet, Place 2 tablets under the tongue Daily., Disp: , Rfl:   •  fludrocortisone 0.1 MG tablet, TAKE 1 TO 2 TABLETS BY MOUTH AS NEEDED FOR SBP<100. AVOID LYING DOWN FOR 2 HOURS AFTER TAKING THE MEDICATION TO PREVENT SUPINE HIGH BLOOD PRESSURE, Disp: 60 tablet, Rfl: 3  •  ibuprofen (ADVIL,MOTRIN) 800 MG tablet, Take 800 mg by mouth Every 6 (Six) Hours As Needed., Disp: , Rfl:   •  levothyroxine (SYNTHROID, LEVOTHROID) 137 MCG tablet, Take 137 mcg by mouth Daily., Disp: , Rfl:      History of Present Illness   HPI  Patrick Shay is a 66 y.o. year old male with the above mentioned PMH who presents for consult from JUAN RAMON Nguyen* for evaluation of labile blood pressures cerebrovascular disease.  Patient has a history dating back 2 years with interventional radiology/neurology at [facility].  At that time he had  a stroke evident of LICA occlusion.  He had moderate to severe R ICA stenosis and underwent stenting.  He subsequently had repeat carotid angiogram 1 month ago that was relatively benign.  He is accompanied by his family member that has a robust list of his blood pressures over the last 3 months.  His blood pressure can be went from systolic of 80-1 80.  He states he feels very poorly when his blood pressure is below 100.  He is taking no antihypertensive medication over the last 3 months.  He only takes fludrocortisone on occasion and states he has not had it in over 3 weeks.  He continues to wean off Suboxone over a slow taper.  He has had no bleeding issues and cannot state that he has had any palpitations.          ROS  Review of Systems   Constitutional: Positive for malaise/fatigue. Negative for chills, fever, night sweats, weight gain and weight loss.   HENT: Negative for hearing loss and nosebleeds.    Eyes: Negative for blurred vision, vision loss in left eye, vision loss in right eye, visual disturbance and visual halos.   Cardiovascular: Positive for near-syncope. Negative for chest pain, claudication, cyanosis, dyspnea on exertion, irregular heartbeat, leg swelling, orthopnea, palpitations, paroxysmal nocturnal dyspnea and syncope.   Respiratory: Negative for cough, hemoptysis, shortness of breath, snoring and wheezing.    Endocrine: Negative for cold intolerance, heat intolerance, polydipsia, polyphagia and polyuria.   Hematologic/Lymphatic: Negative for adenopathy and bleeding problem. Does not bruise/bleed easily.   Skin: Negative for dry skin, poor wound healing and rash.   Musculoskeletal: Negative for falls, joint pain, joint swelling, muscle cramps, muscle weakness, myalgias and neck pain.   Gastrointestinal: Negative for bloating, abdominal pain, change in bowel habit, bowel incontinence, constipation, diarrhea, dysphagia, excessive appetite, heartburn, hematemesis, hematochezia, jaundice,  "melena, nausea and vomiting.   Genitourinary: Negative for bladder incontinence, dysuria, flank pain, hematuria, hesitancy and nocturia.   Neurological: Negative for aphonia, excessive daytime sleepiness, dizziness, focal weakness, headaches, light-headedness, loss of balance, seizures, sensory change, tremors, vertigo and weakness.   Psychiatric/Behavioral: Negative for altered mental status, depression, memory loss, substance abuse and suicidal ideas. The patient is not nervous/anxious.        SOCIAL HX  Social History     Socioeconomic History   • Marital status:    Tobacco Use   • Smoking status: Former Smoker     Packs/day: 2.00     Years: 30.00     Pack years: 60.00     Types: Cigarettes     Quit date:      Years since quittin.3   • Smokeless tobacco: Never Used   Vaping Use   • Vaping Use: Never used   Substance and Sexual Activity   • Alcohol use: Not Currently   • Drug use: Never   • Sexual activity: Yes     Partners: Female       FAMILY HX  Family History   Problem Relation Age of Onset   • Atrial fibrillation Mother    • Cancer Father    • Cancer Maternal Grandmother    • Throat cancer Maternal Grandmother    • Cancer Maternal Grandfather    • Heart attack Paternal Grandmother    • No Known Problems Paternal Grandfather    • No Known Problems Other    • Ataxia Neg Hx    • Chorea Neg Hx    • Dementia Neg Hx    • Mental retardation Neg Hx    • Migraines Neg Hx    • Multiple sclerosis Neg Hx    • Neurofibromatosis Neg Hx    • Neuropathy Neg Hx    • Parkinsonism Neg Hx    • Seizures Neg Hx    • Stroke Neg Hx        Vitals:    22 0855   BP: (!) 190/90   BP Location: Right arm   Patient Position: Sitting   Pulse: 93   SpO2: 100%   Weight: 88.5 kg (195 lb)   Height: 175.3 cm (69\")     Body mass index is 28.8 kg/m².     PHYSICAL EXAMINATION:  Constitutional:       Appearance: Not in distress. Frail.   HENT:         Comments: Prior radical neck dissection  Neck:      Vascular: No JVR. JVD " normal.   Pulmonary:      Effort: Pulmonary effort is normal.      Breath sounds: Normal breath sounds. No wheezing. No rhonchi. No rales.   Chest:      Chest wall: Not tender to palpatation.   Cardiovascular:      PMI at left midclavicular line. Normal rate. Regular rhythm. Normal S1. Normal S2.      Murmurs: There is a systolic murmur.      No gallop. No click. No rub.   Pulses:     Intact distal pulses.   Edema:     Peripheral edema absent.   Abdominal:      General: Bowel sounds are normal.      Palpations: Abdomen is soft.      Tenderness: There is no abdominal tenderness.   Musculoskeletal: Normal range of motion.         General: No tenderness. Skin:     General: Skin is warm and dry.   Neurological:      General: No focal deficit present.      Mental Status: Alert and oriented to person, place and time.      Comments: Speaks with assisted moist vibratory device         Diagnostic Data:    ECG 12 Lead    Date/Time: 5/4/2022 9:17 AM  Performed by: Collins Mejia MD  Authorized by: Collins Mejia MD   Previous ECG: no previous ECG available  Rhythm: sinus rhythm  BPM: 93    Clinical impression: non-specific ECG          Lab Results   Component Value Date    TRIG 96 09/30/2020    HDL 37 (L) 09/30/2020     Lab Results   Component Value Date    GLUCOSE 107 (H) 04/14/2022    BUN 12 04/14/2022    CREATININE 1.21 04/14/2022     04/14/2022    K 4.0 04/14/2022     04/14/2022    CO2 26.0 04/14/2022     Lab Results   Component Value Date    HGBA1C 5.60 09/30/2020     Lab Results   Component Value Date    WBC 7.17 04/14/2022    HGB 8.2 (L) 04/14/2022    HCT 27.6 (L) 04/14/2022     04/14/2022       ASSESSMENT:   Diagnosis Plan   1. Carotid stenosis, status post right carotid stent (4/23/2021)  Duplex Carotid Ultrasound CAR   2. Hyperlipidemia LDL goal <100  Duplex Carotid Ultrasound CAR   3. Essential hypertension  Duplex Carotid Ultrasound CAR   4. Cerebrovascular disease     5. Primary hypertension      6. Mixed hyperlipidemia         PLAN:  Carotid stenosis post PCI and stenting with  of LICA.  Follow-up carotid duplex yearly    Mixed dyslipidemia controlled on statin therapy    Hypertension labile intolerant to multiple classes of antihypertensive agents.  Echocardiogram with mild LVH noted historically.  Risk-benefit ratio patient will remain off of antihypertensive at current given his intolerance and labile low blood pressure.  I have given he and his family vasopressor handout.  Discussed with him need to avoid dehydrated states and becoming overheated.  He is labile blood pressure may transition as he  Is slowly weaned off of Suboxone                HafsaJUAN RAMON Martin*, thank you for referring Mr. Shay for evaluation.  I have forwarded my electronically generated recommendations to you for review.  Please do not hesitate to call with any questions.      Collins Mejia MD, FACC

## 2022-05-04 ENCOUNTER — OFFICE VISIT (OUTPATIENT)
Dept: CARDIOLOGY | Facility: CLINIC | Age: 67
End: 2022-05-04

## 2022-05-04 VITALS
DIASTOLIC BLOOD PRESSURE: 90 MMHG | HEIGHT: 69 IN | SYSTOLIC BLOOD PRESSURE: 190 MMHG | HEART RATE: 93 BPM | WEIGHT: 195 LBS | BODY MASS INDEX: 28.88 KG/M2 | OXYGEN SATURATION: 100 %

## 2022-05-04 DIAGNOSIS — I65.21 CAROTID STENOSIS, ASYMPTOMATIC, RIGHT: Primary | ICD-10-CM

## 2022-05-04 DIAGNOSIS — E78.2 MIXED HYPERLIPIDEMIA: ICD-10-CM

## 2022-05-04 DIAGNOSIS — I67.9 CEREBROVASCULAR DISEASE: ICD-10-CM

## 2022-05-04 DIAGNOSIS — I10 PRIMARY HYPERTENSION: ICD-10-CM

## 2022-05-04 DIAGNOSIS — I10 ESSENTIAL HYPERTENSION: ICD-10-CM

## 2022-05-04 DIAGNOSIS — E78.5 HYPERLIPIDEMIA LDL GOAL <100: ICD-10-CM

## 2022-05-04 PROCEDURE — 99203 OFFICE O/P NEW LOW 30 MIN: CPT | Performed by: INTERNAL MEDICINE

## 2022-05-04 PROCEDURE — 93000 ELECTROCARDIOGRAM COMPLETE: CPT | Performed by: INTERNAL MEDICINE

## 2022-05-04 RX ORDER — AMLODIPINE BESYLATE 2.5 MG/1
2.5 TABLET ORAL DAILY
COMMUNITY
End: 2022-05-04

## 2022-06-26 ENCOUNTER — LAB (OUTPATIENT)
Dept: PREADMISSION TESTING | Facility: HOSPITAL | Age: 67
End: 2022-06-26

## 2022-06-26 DIAGNOSIS — Z11.59 SPECIAL SCREENING EXAMINATION FOR VIRAL DISEASE: ICD-10-CM

## 2022-06-26 LAB — SARS-COV-2 RNA PNL SPEC NAA+PROBE: NOT DETECTED

## 2022-06-26 PROCEDURE — C9803 HOPD COVID-19 SPEC COLLECT: HCPCS

## 2022-06-26 PROCEDURE — U0004 COV-19 TEST NON-CDC HGH THRU: HCPCS

## 2022-07-18 ENCOUNTER — TRANSCRIBE ORDERS (OUTPATIENT)
Dept: ADMINISTRATIVE | Facility: HOSPITAL | Age: 67
End: 2022-07-18

## 2022-07-18 DIAGNOSIS — Z11.59 SPECIAL SCREENING EXAMINATION FOR VIRAL DISEASE: Primary | ICD-10-CM

## 2022-07-26 NOTE — TELEPHONE ENCOUNTER
Caller: JOHN KERN    Relationship: RAJAT MCRAE  [facility]    Best call back number: [phone]  Requested Prescriptions:   Requested Prescriptions     Pending Prescriptions Disp Refills   • apixaban (ELIQUIS) 5 MG tablet tablet 60 tablet 3     Sig: Take 1 tablet by mouth Every 12 (Twelve) Hours. Indications: stroke        Pharmacy where request should be sent:  [facility] #[id number]    Additional details provided by patient: PATIENT IS REQUESTING A 90 DAY SUPPLY IF POSSIBLE    THANK YOU    Does the patient have less than a 3 day supply:  [] Yes  [x] No    Audrey QUINTEROS Rep   07/26/22 10:26 EDT           
No

## 2022-08-15 ENCOUNTER — OFFICE VISIT (OUTPATIENT)
Dept: NEUROLOGY | Facility: CLINIC | Age: 67
End: 2022-08-15

## 2022-08-15 ENCOUNTER — LAB (OUTPATIENT)
Dept: LAB | Facility: HOSPITAL | Age: 67
End: 2022-08-15

## 2022-08-15 VITALS
OXYGEN SATURATION: 97 % | HEART RATE: 90 BPM | BODY MASS INDEX: 30.66 KG/M2 | HEIGHT: 69 IN | WEIGHT: 207 LBS | SYSTOLIC BLOOD PRESSURE: 124 MMHG | DIASTOLIC BLOOD PRESSURE: 70 MMHG

## 2022-08-15 DIAGNOSIS — I69.30 SEQUELAE, POST-STROKE: ICD-10-CM

## 2022-08-15 DIAGNOSIS — R42 DIZZINESS: ICD-10-CM

## 2022-08-15 DIAGNOSIS — I69.30 SEQUELAE, POST-STROKE: Primary | ICD-10-CM

## 2022-08-15 PROCEDURE — 99214 OFFICE O/P EST MOD 30 MIN: CPT | Performed by: PSYCHIATRY & NEUROLOGY

## 2022-08-15 NOTE — PROGRESS NOTES
Subjective:    CC: Patrick Shay is seen today  for stroke      Current visit- patient denies having any strokelike symptoms.  He had a repeat carotid Doppler in March that still showed elevated velocities in the right ICA with occlusion in the left ICA.  After that he underwent a conventional diagnostic angiogram by Dr. Kaplan which showed minimal right CCA/ICA stenosis.  He told him to continue both aspirin and Plavix along with Lipitor 40 mg.  Patient also saw Dr. Mejia from cardiology who ordered a 30-day Holter monitor for him that did not show any atrial fibrillation.  He too continue with him on the same medications.  His dizziness remains well controlled and he has only had to take fludrocortisone once when he bent over and had a spell.  Of note-I reviewed Dr. Kaplan and cardiology notes    Last visit-patient had a repeat carotid Doppler in December that showed continued left ICA occlusion and a patent stent in the right ICA with increased velocities in the right common and internal carotid artery with ICA/CCA ratio 1.3.  After that he saw Dr. Kaplan who felt that the increased velocities could be due to to compensation from his left ICA occlusion.  He will be repeating his carotid Doppler in 3 months to see if they have further worsened.  Patient denies having any new strokelike symptoms.  He continues to take aspirin 81 mg along with Eliquis and Lipitor 40 mg daily.  He also denies having any significant episodes of dizziness/lightheadedness or syncope.  He brought his blood pressure readings from home and most days his systolic blood pressure is elevated (over 150).  He takes fludrocortisone 0.1 mg only as needed for lightheadedness or if his blood pressure is too low.  Of note-I reviewed Dr. Kaplan note.    Last visit-patient's wife had called me a few weeks ago stating that patient was having spells with dizziness and lightheadedness on walking where he was turning pale and his blood pressure was  dropping to the 90s/60s.  After that I had started him on fludrocortisone 0.1 mg daily 1-2 times a day which has really helped with his symptoms.  He denies having any syncopal episodes.  Also denies having any new strokelike symptoms.  Continues to take aspirin, Eliquis and Lipitor 80 mg.  His next appointment with Dr. Kaplan is in December.    Last visit-patient denies having any strokelike symptoms since his last visit. He had right carotid  angioplasty /stent placement in April and was placed on Plavix in addition to his Eliquis. He has been feeling less dizzy since the procedure. His blood pressure was also dropping down to low hence he stopped taking his amlodipine however some home readings are in the 150s. He also continues to take Lipitor 80 mg daily. He had his Holter monitor which showed a 24 beat run of SVT as well as short episodes of atrial tachycardia but no A. fib.    Last visit-since the last visit patient denies having any new strokelike symptoms.  He continues to take aspirin along with Eliquis and Lipitor 80 mg.  He had his carotid Doppler which showed right ICA stenosis of over 70% and severe proximal left CCA stenosis with total occlusion of left ICA.  He also had a CT angiogram of the head and neck that continues to show 50% right ICA stenosis as well as occluded left CCA and proximal left ICA along its entire cervical segment, with reconstitution of the petrous segment.  The previously seen distal left MCA stenosis was not noted at this time.  He also had a Holter monitor the results of which are still pending.  His blood pressure is much better after he cut back on his amlodipine.  Most readings were between 1 10-1 30.    Initial exmcq-25-vogy-old male accompanied by his girlfriend with a history of hypertension, hyperlipidemia, laryngeal cancer status post tracheostomy in 1998 (uses a voice assist device), legally blind in left eye due to eye injury presents as a hospital follow-up for  stroke.  Patient states that he had difficulty getting his words out on 9/29.  He was brought to the hospital where an initial CT scan of the head edema in the left frontal region. After that he had a CT angiogram of the head which showed 50 to 60% of the left cavernous carotid stenosis and superior MCA division occlusion and a CT angiogram of the neck that showed near occlusion of the mid left common carotid artery as well as possible occlusion of the left ICA from the level of the mid left ICA to the proximal petrous segment.  Also 50 to 75% stenosis of the right carotid bulb and narrowing of right ICA to between 50 to 60% noted as well as moderate right mid vertebral stenosis.  CT perfusion showed focal ischemia in the left frontotemporal region.  MRI brain showed an area of restricted diffusion in the left frontotemporal region including the left basal ganglia and insular cortex.  There was some question of whether the patient was taking Xarelto however patient denies that and states that his mother was on Xarelto not him.  He does not have a known history of atrial fibrillation.  Was started on Eliquis 5 mg twice a day (for presumable cardio embolic source) and aspirin 81 mg daily in addition to Lipitor 80 mg daily.  His lipid panel was as follows-, TG 96, LDL 65, HDL 37.  A1c was 5.6.  He states that his speech problems have now subsided.  Of note-I personally reviewed his CT angiogram of the head and neck and his MRI brain as well as 's note    The following portions of the patient's history were reviewed today and updated as of 12/15/2020  : allergies, current medications, past family history, past medical history, past social history, past surgical history and problem list  These document will be scanned to patient's chart.      Current Outpatient Medications:   •  apixaban (ELIQUIS) 5 MG tablet tablet, Take 1 tablet by mouth Every 12 (Twelve) Hours. Indications: stroke, Disp: 60 tablet,  Rfl: 3  •  aspirin 81 MG chewable tablet, Chew 1 tablet Daily., Disp: 30 tablet, Rfl: 5  •  atorvastatin (Lipitor) 40 MG tablet, Take 1 tablet by mouth Daily., Disp: 30 tablet, Rfl: 11  •  buprenorphine-naloxone (SUBOXONE) 8-2 MG per SL tablet, Place 2 tablets under the tongue Daily., Disp: , Rfl:   •  fludrocortisone 0.1 MG tablet, TAKE 1 TO 2 TABLETS BY MOUTH AS NEEDED FOR SBP<100. AVOID LYING DOWN FOR 2 HOURS AFTER TAKING THE MEDICATION TO PREVENT SUPINE HIGH BLOOD PRESSURE, Disp: 60 tablet, Rfl: 3  •  ibuprofen (ADVIL,MOTRIN) 800 MG tablet, Take 800 mg by mouth Every 6 (Six) Hours As Needed., Disp: , Rfl:   •  levothyroxine (SYNTHROID, LEVOTHROID) 137 MCG tablet, Take 137 mcg by mouth Daily., Disp: , Rfl:    Past Medical History:   Diagnosis Date   • Carotid artery occlusion     Left carotid occlusion   • Carotid stenosis, asymptomatic, right 04/23/2021    Status post right carotid stent   • Disease of thyroid gland    • Hyperlipidemia    • Hypertension    • Laryngeal cancer (HCC)       Past Surgical History:   Procedure Laterality Date   • CAROTID STENT Bilateral 4/23/2021    Procedure: Carotid Stent;  Surgeon: Anjel Covarrubias MD;  Location:  [facility];  Service: Interventional Radiology;  Laterality: Bilateral;   • ESOPHAGEAL DILATATION      EVERY 3-4 MONTHS   • INTERVENTIONAL RADIOLOGY PROCEDURE N/A 9/29/2020    Procedure: IR MECHANICAL THROMBECTOMY - PRIMARY;  Surgeon: Aidan Plasencia MD;  Location:  [facility];  Service: Interventional Radiology;  Laterality: N/A;   • INTERVENTIONAL RADIOLOGY PROCEDURE Bilateral 4/14/2022    Procedure: Carotid Cerebral Angiogram;  Surgeon: Anjel Covarrubias MD;  Location:  [facility];  Service: Interventional Radiology;  Laterality: Bilateral;   • MANDIBLE SURGERY     • TRACHEOSTOMY        Family History   Problem Relation Age of Onset   • Atrial fibrillation Mother    • Cancer Father    • Cancer Maternal  "Grandmother    • Throat cancer Maternal Grandmother    • Cancer Maternal Grandfather    • Heart attack Paternal Grandmother    • No Known Problems Paternal Grandfather    • No Known Problems Other    • Ataxia Neg Hx    • Chorea Neg Hx    • Dementia Neg Hx    • Mental retardation Neg Hx    • Migraines Neg Hx    • Multiple sclerosis Neg Hx    • Neurofibromatosis Neg Hx    • Neuropathy Neg Hx    • Parkinsonism Neg Hx    • Seizures Neg Hx    • Stroke Neg Hx       Social History     Socioeconomic History   • Marital status:    Tobacco Use   • Smoking status: Former Smoker     Packs/day: 2.00     Years: 30.00     Pack years: 60.00     Types: Cigarettes     Quit date:      Years since quittin.6   • Smokeless tobacco: Never Used   Vaping Use   • Vaping Use: Never used   Substance and Sexual Activity   • Alcohol use: Not Currently   • Drug use: Never   • Sexual activity: Yes     Partners: Female     Review of Systems   Neurological: Positive for speech difficulty.   All other systems reviewed and are negative.      Objective:    /70   Pulse 90   Ht 175.3 cm (69\")   Wt 93.9 kg (207 lb)   SpO2 97%   BMI 30.57 kg/m²     Neurology Exam:    General apperance: NAD.     Mental status: Alert, awake and oriented to time place and person.    Recent and Remote memory: Intact.    Attention span and Concentration: Normal.     Language and Speech: Intact- moderate dysarthria.  Using a voice assist device to speak    Fluency, Naming , Repitition and Comprehension:  Intact    Cranial Nerves:   CN II: Visual fields Intact in right eye.  He is legally blind in left eye.  Pupils - CHANTEL  CN III, IV and VI: Extraocular movements are intact in right eye.  He could not AB duct his left eye.    CN V: Facial sensation is intact.   CN VII: Muscles of facial expression reveal no asymmetry. Intact.   CN VIII: Hearing is intact. Whispered voice intact.   CN IX and X: Palate elevates symmetrically. Intact  CN XI: Shoulder " shrug is intact.   CN XII: Tongue is midline without evidence of atrophy or fasciculation.     Ophthalmoscopic exam of optic disc-poorly visualized    Motor:  Right UE muscle strength 5/5. Normal tone.     Left UE muscle strength 5/5. Normal tone.      Right LE muscle strength5/5. Normal tone.     Left LE muscle strength 5/5. Normal tone.      Sensory: Normal light touch, vibration and pinprick sensation bilaterally.    DTRs: 2+ bilaterally in upper and lower extremities.    Babinski: Negative bilaterally.    Co-ordination: Normal finger-to-nose, heel to shin B/L.    Rhomberg: Negative.    Gait: Normal.    Cardiovascular: Regular rate and rhythm without murmur, gallop or rub.    Assessment and Plan:  1. Sequelae, post-stroke  Patient had a left MCA stroke which could have been due to artery to artery embolus secondary to left ICA occlusion (acute versus chronic) with emboli to the superior branch of left MCA versus a cardioembolic phenomenon (less likely)  His initial Holter showed SVT and atrial tachycardia.  Most recent Holter monitor did not show any atrial fibrillation  He had a right ICA carotid stent for asymptomatic severe stenosis which is now mild and left ICA occlusion.  Repeat carotid Doppler has been ordered for a few months  He should continue aspirin along with Eliquis 5 mg twice daily for now.  I have told him to speak to neurosurgery to see if he can be taken off of Eliquis and just continued on DAPT  I have asked him to continue Lipitor 40 mg LDL of less than 100.  His LDL was 65  -Goal blood pressure less than 140/90.     2.  Dizziness  Most likely due to orthostatic hypotension  He can continue fludrocortisone 0.1 mg only as needed for dizziness and blood pressure of less than 110/60 and not every day as his blood pressure runs high.  I have also counseled him to keep himself well-hydrated and start wearing above-knee moderate compression stockings    Return in about 9 months (around 5/15/2023).        Tyra Barakat MD

## 2022-08-16 LAB
CHOLEST SERPL-MCNC: 89 MG/DL (ref 0–200)
HDLC SERPL-MCNC: 44 MG/DL (ref 40–60)
LDLC SERPL CALC-MCNC: 32 MG/DL (ref 0–100)
TRIGL SERPL-MCNC: 54 MG/DL (ref 0–150)
VLDLC SERPL CALC-MCNC: 13 MG/DL (ref 5–40)

## 2022-08-17 ENCOUNTER — TELEPHONE (OUTPATIENT)
Dept: NEUROSURGERY | Facility: CLINIC | Age: 67
End: 2022-08-17

## 2022-08-17 ENCOUNTER — TELEPHONE (OUTPATIENT)
Dept: NEUROLOGY | Facility: CLINIC | Age: 67
End: 2022-08-17

## 2022-08-17 DIAGNOSIS — I65.21 CAROTID STENOSIS, ASYMPTOMATIC, RIGHT: Primary | ICD-10-CM

## 2022-08-17 DIAGNOSIS — I65.21 ASYMPTOMATIC STENOSIS OF RIGHT CAROTID ARTERY: ICD-10-CM

## 2022-08-17 RX ORDER — CLOPIDOGREL BISULFATE 75 MG/1
75 TABLET ORAL DAILY
Qty: 30 TABLET | Refills: 11 | Status: SHIPPED | OUTPATIENT
Start: 2022-08-17 | End: 2023-08-17

## 2022-08-17 NOTE — TELEPHONE ENCOUNTER
Caller:  JOHN     Relationship: SIGNIFICANT OTHER ( [facility] IN CHART)    Best call back number: [phone]    What orders are you requesting (i.e. lab or imaging): CAROTID DUPLEX    In what timeframe would the patient need to come in: AS SOON AS POSSIBLE    Where will you receive your lab/imaging services:    Additional notes: JOHN CALLED TO MAKE F/U WITH DR. MCCALL.  WAS GOING TO ASSIST IN SCHEDULING DUPLEX AND APPT ON SAME DAY.      PT WILL NEED CAROTID DUPLEX ORDERED.  THE ONLY ONE IN CHART IS FROM CARDIOLOGY.    PLEASE CALL JOHN   THANK YOU

## 2022-08-17 NOTE — TELEPHONE ENCOUNTER
I had told one of the nurses in the morning to call the patient with this message-    Please let the patient know that I discussed his case with the stroke neurologist at the hospital who had initially put him on Eliquis.  We both feel that the Eliquis can be stopped as he has good flow to the left side from the right.  He can now take a combination of aspirin and Plavix.  I have sent the Plavix prescription to his pharmacy.  His cholesterol looks good but I want him to continue Lipitor 40 mg daily.

## 2022-08-17 NOTE — TELEPHONE ENCOUNTER
Caller: Abeba Ayers    Relationship: Emergency Contact    Best call back number: [phone]    What medications are you currently taking:   Current Outpatient Medications on File Prior to Visit   Medication Sig Dispense Refill   • aspirin 81 MG chewable tablet Chew 1 tablet Daily. 30 tablet 5   • atorvastatin (Lipitor) 40 MG tablet Take 1 tablet by mouth Daily. 30 tablet 11   • buprenorphine-naloxone (SUBOXONE) 8-2 MG per SL tablet Place 2 tablets under the tongue Daily.     • clopidogrel (Plavix) 75 MG tablet Take 1 tablet by mouth Daily. 30 tablet 11   • fludrocortisone 0.1 MG tablet TAKE 1 TO 2 TABLETS BY MOUTH AS NEEDED FOR SBP<100. AVOID LYING DOWN FOR 2 HOURS AFTER TAKING THE MEDICATION TO PREVENT SUPINE HIGH BLOOD PRESSURE 60 tablet 3   • ibuprofen (ADVIL,MOTRIN) 800 MG tablet Take 800 mg by mouth Every 6 (Six) Hours As Needed.     • levothyroxine (SYNTHROID, LEVOTHROID) 137 MCG tablet Take 137 mcg by mouth Daily.     • [DISCONTINUED] apixaban (ELIQUIS) 5 MG tablet tablet Take 1 tablet by mouth Every 12 (Twelve) Hours. Indications: stroke 60 tablet 3     No current facility-administered medications on file prior to visit.      Which medication are you concerned about:    Clopidogrel Bisulfate 75 mg    Who prescribed you this medication:   DR SILVER    What are your concerns:   PT RECEIVED CALL FROM PHARM SAYING THIS RX WAS READY FOR HIM TO P/U. PT'S GIRL FRIEND SAID THIS RX WASN'T DISCUSSED AT THE LAST OV ON 8-15-22.    SHOULD PT TAKE THIS RX WHILE TAKING THE apixaban (ELIQUIS) 5 MG tablet tablet?    PLEASE CALL TO LET PT KNOW IF HE SHOULD BE TAKING BOTH THE ELIQUIS AND THE CLOPIDOGREL BISULFATE.

## 2022-08-19 NOTE — TELEPHONE ENCOUNTER
Called Abeba (spouse) to let her know what Dr. Barakat said about her message. Dr. Barakat  Discussed his case with the stroke Neurologist at the hospital who initially put him on Eliquis. Both feel that Eliquis can be stooped as he has good flow to the left side from the right . He can now take a combination of aspirin and Plavix,( she sent the prescription to the pharmacy) That his cholesterol looked good but she wants thim to continue taking Lipitor 40 mg daily. She appreciate the call and clarification sense the nurse that Dr. Barakat give the message to  in the morning to let the patient know, she never called the patient, that's why they were confused.  Spouse had a verbal understanding .

## 2022-08-19 NOTE — TELEPHONE ENCOUNTER
This is what I want you to tell him-    I discussed his case with the stroke neurologist at the hospital who had initially put him on Eliquis.  We both feel that the Eliquis can be stopped as he has good flow to the left side from the right.  He can now take a combination of aspirin and Plavix.  I have sent the Plavix prescription to his pharmacy.  His cholesterol looks good but I want him to continue Lipitor 40 mg daily.

## 2022-08-19 NOTE — TELEPHONE ENCOUNTER
Caller: Abeba Ayers    Relationship: Emergency Contact    Best call back number: [phone]    What is the best time to reach you: ANYTIME    Who are you requesting to speak with (clinical staff, provider,  specific staff member): DR. SILVER    Do you know the name of the person who called: N/A    What was the call regarding: PATIENT HAS HAD A CHANGE IN PRESCRIPTION, BUT WANTS TO KNOW SHOULD THE PATIENT CONTINUE TO TAKE ASPIRIN W/PLAVIX? OR SHOULD PATIENT CONTINUE ON ELIQUIS? PLEASE ADVISE. THANK YOU.    Do you require a callback: YES

## 2022-08-22 ENCOUNTER — APPOINTMENT (OUTPATIENT)
Dept: CARDIOLOGY | Facility: HOSPITAL | Age: 67
End: 2022-08-22

## 2022-09-07 ENCOUNTER — HOSPITAL ENCOUNTER (OUTPATIENT)
Dept: CARDIOLOGY | Facility: HOSPITAL | Age: 67
Discharge: HOME | End: 2022-09-07
Admitting: RADIOLOGY

## 2022-09-07 ENCOUNTER — PREP FOR SURGERY (OUTPATIENT)
Dept: OTHER | Facility: HOSPITAL | Age: 67
End: 2022-09-07

## 2022-09-07 ENCOUNTER — OFFICE VISIT (OUTPATIENT)
Dept: NEUROSURGERY | Facility: CLINIC | Age: 67
End: 2022-09-07

## 2022-09-07 VITALS
SYSTOLIC BLOOD PRESSURE: 140 MMHG | TEMPERATURE: 97.5 F | DIASTOLIC BLOOD PRESSURE: 80 MMHG | WEIGHT: 206.4 LBS | HEART RATE: 75 BPM | BODY MASS INDEX: 30.57 KG/M2 | HEIGHT: 69 IN

## 2022-09-07 VITALS — BODY MASS INDEX: 30.66 KG/M2 | HEIGHT: 69 IN | WEIGHT: 207.01 LBS

## 2022-09-07 DIAGNOSIS — I65.21 ASYMPTOMATIC STENOSIS OF RIGHT CAROTID ARTERY: ICD-10-CM

## 2022-09-07 DIAGNOSIS — I65.21 CAROTID STENOSIS, ASYMPTOMATIC, RIGHT: Primary | ICD-10-CM

## 2022-09-07 DIAGNOSIS — I65.21 CAROTID STENOSIS, ASYMPTOMATIC, RIGHT: ICD-10-CM

## 2022-09-07 LAB
BH CV MID RIGHT ICA HIDDEN LRR: 1 CM
BH CV RIGHT CCA HIDDEN LRR: 1 CM/S
BH CV VAS CAROTID RIGHT DISTAL STENT EDV: 108 CM/S
BH CV VAS CAROTID RIGHT DISTAL STENT PSV: 223 CM/S
BH CV VAS CAROTID RIGHT DISTAL TO STENT NATIVE VESSEL E: 86.4 CM/S
BH CV VAS CAROTID RIGHT DISTAL TO STENT PSV: 190 CM/S
BH CV VAS CAROTID RIGHT MID STENT EDV: 86 CM/S
BH CV VAS CAROTID RIGHT MID STENT PSV: 228 CM/S
BH CV VAS CAROTID RIGHT PROXIMAL STENT EDV: 307 CM/S
BH CV VAS CAROTID RIGHT PROXIMAL STENT PSV: 704 CM/S
BH CV VAS CAROTID RIGHT STENT NATIVE VESSEL PROXIMAL EDV: 118 CM/S
BH CV VAS CAROTID RIGHT STENT NATIVE VESSEL PROXIMAL PS: 247 CM/S
BH CV XLRA MEAS LEFT DIST CCA PSV: 69.2 CM/SEC
BH CV XLRA MEAS LEFT MID CCA EDV: 210 CM/SEC
BH CV XLRA MEAS LEFT MID CCA PSV: 498 CM/SEC
BH CV XLRA MEAS LEFT PROX CCA EDV: 32.2 CM/SEC
BH CV XLRA MEAS LEFT PROX CCA PSV: 69.2 CM/SEC
BH CV XLRA MEAS LEFT PROX ECA EDV: 42.4 CM/SEC
BH CV XLRA MEAS LEFT PROX ECA PSV: 101 CM/SEC
BH CV XLRA MEAS LEFT PROX SCLA PSV: 476 CM/SEC
BH CV XLRA MEAS RIGHT DIST CCA EDV: 286 CM/SEC
BH CV XLRA MEAS RIGHT DIST CCA PSV: 711 CM/SEC
BH CV XLRA MEAS RIGHT DIST ICA EDV: 86.4 CM/SEC
BH CV XLRA MEAS RIGHT DIST ICA PSV: 190 CM/SEC
BH CV XLRA MEAS RIGHT ICA/CCA RATIO: 1.01
BH CV XLRA MEAS RIGHT MID CCA EDV: 113 CM/SEC
BH CV XLRA MEAS RIGHT MID CCA PSV: 225 CM/SEC
BH CV XLRA MEAS RIGHT MID ICA EDV: 108 CM/SEC
BH CV XLRA MEAS RIGHT MID ICA PSV: 223 CM/SEC
BH CV XLRA MEAS RIGHT PROX CCA EDV: 83.3 CM/SEC
BH CV XLRA MEAS RIGHT PROX CCA PSV: 289 CM/SEC
BH CV XLRA MEAS RIGHT PROX ECA EDV: 229 CM/SEC
BH CV XLRA MEAS RIGHT PROX ECA PSV: 572 CM/SEC
BH CV XLRA MEAS RIGHT PROX ICA EDV: 86.4 CM/SEC
BH CV XLRA MEAS RIGHT PROX ICA PSV: 228 CM/SEC
BH CV XLRA MEAS RIGHT PROX SCLA PSV: 275 CM/SEC
BH CV XLRA MEAS RIGHT VERTEBRAL A EDV: 33.4 CM/SEC
BH CV XLRA MEAS RIGHT VERTEBRAL A PSV: 79.7 CM/SEC
BH CVPROX RIGHT ICA HIDDEN LRR: 1 CM
LEFT ARM BP: NORMAL MMHG
MAXIMAL PREDICTED HEART RATE: 152 BPM
RIGHT ARM BP: NORMAL MMHG
STRESS TARGET HR: 129 BPM

## 2022-09-07 PROCEDURE — 93880 EXTRACRANIAL BILAT STUDY: CPT | Performed by: INTERNAL MEDICINE

## 2022-09-07 PROCEDURE — 93880 EXTRACRANIAL BILAT STUDY: CPT

## 2022-09-07 PROCEDURE — 99214 OFFICE O/P EST MOD 30 MIN: CPT | Performed by: RADIOLOGY

## 2022-09-07 RX ORDER — BIMATOPROST 0.01 %
DROPS OPHTHALMIC (EYE)
COMMUNITY
Start: 2022-08-31

## 2022-09-07 RX ORDER — BRIMONIDINE TARTRATE/TIMOLOL 0.2%-0.5%
DROPS OPHTHALMIC (EYE)
COMMUNITY
Start: 2022-08-31

## 2022-09-07 RX ORDER — SODIUM CHLORIDE 0.9 % (FLUSH) 0.9 %
10 SYRINGE (ML) INJECTION EVERY 12 HOURS SCHEDULED
Status: CANCELLED | OUTPATIENT
Start: 2022-09-07

## 2022-09-07 RX ORDER — SODIUM CHLORIDE 9 MG/ML
50 INJECTION, SOLUTION INTRAVENOUS CONTINUOUS
Status: CANCELLED | OUTPATIENT
Start: 2022-09-07

## 2022-09-07 RX ORDER — SODIUM CHLORIDE 0.9 % (FLUSH) 0.9 %
1-10 SYRINGE (ML) INJECTION AS NEEDED
Status: CANCELLED | OUTPATIENT
Start: 2022-09-07

## 2022-09-07 RX ORDER — BRINZOLAMIDE 10 MG/ML
SUSPENSION/ DROPS OPHTHALMIC
COMMUNITY
Start: 2022-09-06

## 2022-09-07 NOTE — PROGRESS NOTES
"NAME: SHAINA PICHARDO   DOS: 2022  : 1955  PCP: Ernie Hsieh MD    Chief Complaint:    Chief Complaint   Patient presents with   • Follow-up     Carotid Stenosis Asymptomatic       History of Present Illness:  67 y.o. male who is well-known to the neuro interventional service, having undergone angioplasty/stent placement for high-grade right carotid stenosis on 2021.  Mr. Pichardo has a history of left carotid occlusion, as well as prior surgery/radiation to the neck for laryngeal cancer, and thus was a \"high surgical risk\" for endarterectomy.  He is done well following his procedure, denying any new stroke or TIA-like symptoms.  However, he has had progressively increasing velocities at the proximal margin of his right carotid stent construct, concerning for a high-grade stenosis.  He has been managed medically to this point with a dual antiplatelet/statin regimen.  He presents today for follow-up of his asymptomatic right carotid stenosis.      Past Medical History:  Past Medical History:   Diagnosis Date   • Carotid artery occlusion     Left carotid occlusion   • Carotid stenosis, asymptomatic, right 2021    Status post right carotid stent   • Disease of thyroid gland    • Hyperlipidemia    • Hypertension    • Laryngeal cancer (HCC)        Past Surgical History:  Past Surgical History:   Procedure Laterality Date   • CAROTID STENT Bilateral 2021    Procedure: Carotid Stent;  Surgeon: Anjel Covarrubias MD;  Location:  [facility];  Service: Interventional Radiology;  Laterality: Bilateral;   • ESOPHAGEAL DILATATION      EVERY 3-4 MONTHS   • INTERVENTIONAL RADIOLOGY PROCEDURE N/A 2020    Procedure: IR MECHANICAL THROMBECTOMY - PRIMARY;  Surgeon: Aidan Plasencia MD;  Location:  [facility];  Service: Interventional Radiology;  Laterality: N/A;   • INTERVENTIONAL RADIOLOGY PROCEDURE Bilateral 2022    Procedure: Carotid Cerebral " Angiogram;  Surgeon: Anjel Covarrubias MD;  Location: [facility] INVASIVE LOCATION;  Service: Interventional Radiology;  Laterality: Bilateral;   • MANDIBLE SURGERY     • TRACHEOSTOMY         Review of Systems:        Review of Systems   Constitutional: Negative for activity change, appetite change, chills, diaphoresis, fatigue, fever and unexpected weight change.   HENT: Negative for congestion, dental problem, drooling, ear discharge, ear pain, facial swelling, hearing loss, mouth sores, nosebleeds, postnasal drip, rhinorrhea, sinus pressure, sinus pain, sneezing, sore throat, tinnitus, trouble swallowing and voice change.    Eyes: Negative for photophobia, pain, discharge, redness, itching and visual disturbance.   Respiratory: Negative for apnea, cough, choking, chest tightness, shortness of breath, wheezing and stridor.    Cardiovascular: Negative for chest pain, palpitations and leg swelling.   Gastrointestinal: Negative for abdominal distention, abdominal pain, anal bleeding, blood in stool, constipation, diarrhea, nausea, rectal pain and vomiting.   Endocrine: Negative for cold intolerance, heat intolerance, polydipsia, polyphagia and polyuria.   Genitourinary: Negative for decreased urine volume, difficulty urinating, dysuria, enuresis, flank pain, frequency, genital sores, hematuria, penile discharge, penile pain, penile swelling, scrotal swelling, testicular pain and urgency.   Musculoskeletal: Negative for arthralgias, back pain, gait problem, joint swelling, myalgias, neck pain and neck stiffness.   Skin: Negative for color change, pallor, rash and wound.   Allergic/Immunologic: Negative for environmental allergies, food allergies and immunocompromised state.   Neurological: Negative for dizziness, tremors, seizures, syncope, facial asymmetry, speech difficulty, weakness, light-headedness, numbness and headaches.   Hematological: Negative for adenopathy. Does not bruise/bleed easily.    Psychiatric/Behavioral: Negative for agitation, behavioral problems, confusion, decreased concentration, dysphoric mood, hallucinations, self-injury, sleep disturbance and suicidal ideas. The patient is not nervous/anxious and is not hyperactive.         Medications    Current Outpatient Medications:   •  aspirin 81 MG chewable tablet, Chew 1 tablet Daily., Disp: 30 tablet, Rfl: 5  •  atorvastatin (Lipitor) 40 MG tablet, Take 1 tablet by mouth Daily., Disp: 30 tablet, Rfl: 11  •  brinzolamide (AZOPT) 1 % ophthalmic suspension, , Disp: , Rfl:   •  buprenorphine-naloxone (SUBOXONE) 8-2 MG per SL tablet, Place 2 tablets under the tongue Daily., Disp: , Rfl:   •  clopidogrel (Plavix) 75 MG tablet, Take 1 tablet by mouth Daily., Disp: 30 tablet, Rfl: 11  •  Combigan 0.2-0.5 % ophthalmic solution, INSTILL 1 DROP IN LEFT EYE TWICE DAILY, Disp: , Rfl:   •  fludrocortisone 0.1 MG tablet, TAKE 1 TO 2 TABLETS BY MOUTH AS NEEDED FOR SBP<100. AVOID LYING DOWN FOR 2 HOURS AFTER TAKING THE MEDICATION TO PREVENT SUPINE HIGH BLOOD PRESSURE, Disp: 60 tablet, Rfl: 3  •  ibuprofen (ADVIL,MOTRIN) 800 MG tablet, Take 800 mg by mouth Every 6 (Six) Hours As Needed., Disp: , Rfl:   •  levothyroxine (SYNTHROID, LEVOTHROID) 137 MCG tablet, Take 137 mcg by mouth Daily., Disp: , Rfl:   •  Lumigan 0.01 % ophthalmic drops, INSTILL 1 DROP IN LEFT EYE AT BEDTIME, Disp: , Rfl:     Allergies:  No Known Allergies    Social Hx:  Social History     Tobacco Use   • Smoking status: Former Smoker     Packs/day: 2.00     Years: 30.00     Pack years: 60.00     Types: Cigarettes     Quit date:      Years since quittin.6   • Smokeless tobacco: Never Used   Vaping Use   • Vaping Use: Never used   Substance Use Topics   • Alcohol use: Not Currently   • Drug use: Never       Family Hx:  Family History   Problem Relation Age of Onset   • Atrial fibrillation Mother    • Cancer Father    • Cancer Maternal Grandmother    • Throat cancer Maternal  Grandmother    • Cancer Maternal Grandfather    • Heart attack Paternal Grandmother    • No Known Problems Paternal Grandfather    • No Known Problems Other    • Ataxia Neg Hx    • Chorea Neg Hx    • Dementia Neg Hx    • Mental retardation Neg Hx    • Migraines Neg Hx    • Multiple sclerosis Neg Hx    • Neurofibromatosis Neg Hx    • Neuropathy Neg Hx    • Parkinsonism Neg Hx    • Seizures Neg Hx    • Stroke Neg Hx        Review of Imaging:  Carotid duplex dated 9/7/2022 from [facility] was reviewed along with its corresponding radiologic report.  Comparison is made to multiple prior studies at [facility], most recent being on 3/14/2022.  Again seen is chronic occlusion of the left internal carotid artery.  While the right carotid stent construct remains patent, there are progressively increasing velocities at the proximal margin of the stent construct within the common carotid artery, consistent with a high-grade (greater than 80%) stenosis.  Peak velocities at the proximal margin of the stent are 705/307 cm/s (was 674/255 cm/s on 3/14/2022, was 521/193 cm/s on 12/1/2021).    Physical Examination:  Vitals:    09/07/22 1137   BP: 140/80   Pulse: 75   Temp: 97.5 °F (36.4 °C)        General Appearance:   Well developed, well nourished, well groomed, alert, and cooperative.  Cardiovascular: Regular rate and rhythm. No carotid bruits      Neurological examination:  Neurologic Exam      Mental Status   Oriented to person, place, and time.   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     CN III, IV, VI   Extraocular motions are normal.      CN V   Facial sensation intact.      CN VII   Facial expression full, symmetric.      CN VIII   CN VIII normal.   Postsurgical changes related to  bilateral radical neck dissection.      Motor Exam      Strength   Strength 5/5 throughout.      Sensory Exam   Light touch normal.      Gait, Coordination, and Reflexes      Gait  Gait: normal      Diagnoses/Plan:    Mr. Shay is a 67 y.o. male with history of radiation/surgery to the neck as well as a laryngectomy.  He is also has a chronic left carotid occlusion.  He underwent angioplasty/stent placement for high-grade right carotid stenosis in April 2021.  While he remains asymptomatic, serial follow-up carotid duplex examination demonstrates progressively increasing velocities within the right carotid vasculature consistent with a recurrent/high-grade stenosis at the proximal margin of his right carotid stent construct.  His velocities continue to increase, and I am concerned about impending carotid occlusion/stroke.  I discussed this in detail with Mr. Cohen and family, and given the progressively increasing velocities, I gave them the option of angioplasty/stent placement versus continued medical management.  They wish to pursue angioplasty/stent placement, and I think this is certainly reasonable.  We will tentatively schedule him for right carotid angioplasty/stent placement in the next week or 2, via femoral access.  He will remain on his a dual antiplatelet regimen.      Mr. Shay also has an ulcerative lesion on his right ear, and I am concerned this is a malignancy.  He has a dermatologist which he sees regularly for multiple cutaneous malignancies, and Mr. Cohen/family have assured me they will follow-up with their dermatologist in the next couple of weeks or so as well.    Copied text and portions of the note have been reviewed and are accurate as of 9/7/22.

## 2022-09-21 ENCOUNTER — HOSPITAL ENCOUNTER (INPATIENT)
Facility: HOSPITAL | Age: 67
LOS: 1 days | Discharge: HOME | End: 2022-09-22
Attending: RADIOLOGY | Admitting: RADIOLOGY

## 2022-09-21 DIAGNOSIS — I65.21 CAROTID STENOSIS, ASYMPTOMATIC, RIGHT: ICD-10-CM

## 2022-09-21 LAB
ACT BLD: 248 SECONDS (ref 82–152)
ANION GAP SERPL CALCULATED.3IONS-SCNC: 11 MMOL/L (ref 5–15)
BUN SERPL-MCNC: 12 MG/DL (ref 8–23)
BUN/CREAT SERPL: 10.1 (ref 7–25)
CALCIUM SPEC-SCNC: 8.9 MG/DL (ref 8.6–10.5)
CHLORIDE SERPL-SCNC: 104 MMOL/L (ref 98–107)
CO2 SERPL-SCNC: 25 MMOL/L (ref 22–29)
CREAT SERPL-MCNC: 1.19 MG/DL (ref 0.76–1.27)
DEPRECATED RDW RBC AUTO: 44.3 FL (ref 37–54)
EGFRCR SERPLBLD CKD-EPI 2021: 66.5 ML/MIN/1.73
ERYTHROCYTE [DISTWIDTH] IN BLOOD BY AUTOMATED COUNT: 18.2 % (ref 12.3–15.4)
GLUCOSE SERPL-MCNC: 105 MG/DL (ref 65–99)
HCT VFR BLD AUTO: 31.7 % (ref 37.5–51)
HGB BLD-MCNC: 8.7 G/DL (ref 13–17.7)
MCH RBC QN AUTO: 19 PG (ref 26.6–33)
MCHC RBC AUTO-ENTMCNC: 27.4 G/DL (ref 31.5–35.7)
MCV RBC AUTO: 69.4 FL (ref 79–97)
PA ADP PRP-ACNC: 248 PRU
PLATELET # BLD AUTO: 207 10*3/MM3 (ref 140–450)
PMV BLD AUTO: 9.5 FL (ref 6–12)
POTASSIUM SERPL-SCNC: 3.8 MMOL/L (ref 3.5–5.2)
RBC # BLD AUTO: 4.57 10*6/MM3 (ref 4.14–5.8)
SODIUM SERPL-SCNC: 140 MMOL/L (ref 136–145)
WBC NRBC COR # BLD: 7.12 10*3/MM3 (ref 3.4–10.8)

## 2022-09-21 PROCEDURE — 037H3DZ DILATION OF RIGHT COMMON CAROTID ARTERY WITH INTRALUMINAL DEVICE, PERCUTANEOUS APPROACH: ICD-10-PCS | Performed by: RADIOLOGY

## 2022-09-21 PROCEDURE — 25010000002 FENTANYL CITRATE (PF) 50 MCG/ML SOLUTION: Performed by: RADIOLOGY

## 2022-09-21 PROCEDURE — 85347 COAGULATION TIME ACTIVATED: CPT

## 2022-09-21 PROCEDURE — 76937 US GUIDE VASCULAR ACCESS: CPT | Performed by: RADIOLOGY

## 2022-09-21 PROCEDURE — 0 IODIXANOL PER 1 ML: Performed by: RADIOLOGY

## 2022-09-21 PROCEDURE — C1769 GUIDE WIRE: HCPCS | Performed by: RADIOLOGY

## 2022-09-21 PROCEDURE — C1876 STENT, NON-COA/NON-COV W/DEL: HCPCS | Performed by: RADIOLOGY

## 2022-09-21 PROCEDURE — 99232 SBSQ HOSP IP/OBS MODERATE 35: CPT | Performed by: INTERNAL MEDICINE

## 2022-09-21 PROCEDURE — 99153 MOD SED SAME PHYS/QHP EA: CPT

## 2022-09-21 PROCEDURE — 99153 MOD SED SAME PHYS/QHP EA: CPT | Performed by: RADIOLOGY

## 2022-09-21 PROCEDURE — 80048 BASIC METABOLIC PNL TOTAL CA: CPT | Performed by: RADIOLOGY

## 2022-09-21 PROCEDURE — C1894 INTRO/SHEATH, NON-LASER: HCPCS | Performed by: RADIOLOGY

## 2022-09-21 PROCEDURE — 25010000002 MIDAZOLAM PER 1 MG: Performed by: RADIOLOGY

## 2022-09-21 PROCEDURE — 037K3DZ DILATION OF RIGHT INTERNAL CAROTID ARTERY WITH INTRALUMINAL DEVICE, PERCUTANEOUS APPROACH: ICD-10-PCS | Performed by: RADIOLOGY

## 2022-09-21 PROCEDURE — 99152 MOD SED SAME PHYS/QHP 5/>YRS: CPT | Performed by: RADIOLOGY

## 2022-09-21 PROCEDURE — C1884 EMBOLIZATION PROTECT SYST: HCPCS | Performed by: RADIOLOGY

## 2022-09-21 PROCEDURE — 25010000002 HEPARIN (PORCINE) PER 1000 UNITS: Performed by: RADIOLOGY

## 2022-09-21 PROCEDURE — 85027 COMPLETE CBC AUTOMATED: CPT | Performed by: RADIOLOGY

## 2022-09-21 PROCEDURE — 99152 MOD SED SAME PHYS/QHP 5/>YRS: CPT

## 2022-09-21 PROCEDURE — 37215 TRANSCATH STENT CCA W/EPS: CPT | Performed by: RADIOLOGY

## 2022-09-21 PROCEDURE — C1725 CATH, TRANSLUMIN NON-LASER: HCPCS | Performed by: RADIOLOGY

## 2022-09-21 PROCEDURE — 85576 BLOOD PLATELET AGGREGATION: CPT | Performed by: RADIOLOGY

## 2022-09-21 DEVICE — XACT CAROTID STENT SYSTEM 10.0 MM X 30 MM STRAIGHT
Type: IMPLANTABLE DEVICE | Status: FUNCTIONAL
Brand: XACT

## 2022-09-21 RX ORDER — SODIUM CHLORIDE 0.9 % (FLUSH) 0.9 %
10 SYRINGE (ML) INJECTION EVERY 12 HOURS SCHEDULED
Status: DISCONTINUED | OUTPATIENT
Start: 2022-09-21 | End: 2022-09-22 | Stop reason: HOSPADM

## 2022-09-21 RX ORDER — HEPARIN SODIUM 1000 [USP'U]/ML
INJECTION, SOLUTION INTRAVENOUS; SUBCUTANEOUS AS NEEDED
Status: DISCONTINUED | OUTPATIENT
Start: 2022-09-21 | End: 2022-09-21 | Stop reason: HOSPADM

## 2022-09-21 RX ORDER — LIDOCAINE HYDROCHLORIDE 10 MG/ML
INJECTION, SOLUTION EPIDURAL; INFILTRATION; INTRACAUDAL; PERINEURAL AS NEEDED
Status: DISCONTINUED | OUTPATIENT
Start: 2022-09-21 | End: 2022-09-21 | Stop reason: HOSPADM

## 2022-09-21 RX ORDER — MIDAZOLAM HYDROCHLORIDE 1 MG/ML
INJECTION INTRAMUSCULAR; INTRAVENOUS AS NEEDED
Status: DISCONTINUED | OUTPATIENT
Start: 2022-09-21 | End: 2022-09-21 | Stop reason: HOSPADM

## 2022-09-21 RX ORDER — LEVOTHYROXINE SODIUM 137 UG/1
137 TABLET ORAL DAILY
Status: DISCONTINUED | OUTPATIENT
Start: 2022-09-21 | End: 2022-09-22 | Stop reason: HOSPADM

## 2022-09-21 RX ORDER — IODIXANOL 320 MG/ML
INJECTION, SOLUTION INTRAVASCULAR AS NEEDED
Status: DISCONTINUED | OUTPATIENT
Start: 2022-09-21 | End: 2022-09-21 | Stop reason: HOSPADM

## 2022-09-21 RX ORDER — CLOPIDOGREL BISULFATE 75 MG/1
75 TABLET ORAL NIGHTLY
Status: DISCONTINUED | OUTPATIENT
Start: 2022-09-22 | End: 2022-09-22 | Stop reason: HOSPADM

## 2022-09-21 RX ORDER — CLOPIDOGREL BISULFATE 75 MG/1
75 TABLET ORAL DAILY
Status: DISCONTINUED | OUTPATIENT
Start: 2022-09-21 | End: 2022-09-21

## 2022-09-21 RX ORDER — BUPRENORPHINE HYDROCHLORIDE AND NALOXONE HYDROCHLORIDE DIHYDRATE 8; 2 MG/1; MG/1
2 TABLET SUBLINGUAL DAILY
Status: DISCONTINUED | OUTPATIENT
Start: 2022-09-21 | End: 2022-09-22 | Stop reason: HOSPADM

## 2022-09-21 RX ORDER — SODIUM CHLORIDE 9 MG/ML
50 INJECTION, SOLUTION INTRAVENOUS CONTINUOUS
Status: DISCONTINUED | OUTPATIENT
Start: 2022-09-21 | End: 2022-09-22 | Stop reason: HOSPADM

## 2022-09-21 RX ORDER — ASPIRIN 81 MG/1
81 TABLET, CHEWABLE ORAL DAILY
Status: DISCONTINUED | OUTPATIENT
Start: 2022-09-21 | End: 2022-09-22 | Stop reason: HOSPADM

## 2022-09-21 RX ORDER — SODIUM CHLORIDE 0.9 % (FLUSH) 0.9 %
1-10 SYRINGE (ML) INJECTION AS NEEDED
Status: DISCONTINUED | OUTPATIENT
Start: 2022-09-21 | End: 2022-09-22 | Stop reason: HOSPADM

## 2022-09-21 RX ORDER — BRINZOLAMIDE 10 MG/ML
1 SUSPENSION/ DROPS OPHTHALMIC 3 TIMES DAILY
Status: DISCONTINUED | OUTPATIENT
Start: 2022-09-21 | End: 2022-09-22 | Stop reason: HOSPADM

## 2022-09-21 RX ORDER — FENTANYL CITRATE 50 UG/ML
INJECTION, SOLUTION INTRAMUSCULAR; INTRAVENOUS AS NEEDED
Status: DISCONTINUED | OUTPATIENT
Start: 2022-09-21 | End: 2022-09-21 | Stop reason: HOSPADM

## 2022-09-21 RX ORDER — SODIUM CHLORIDE 0.9 % (FLUSH) 0.9 %
10 SYRINGE (ML) INJECTION AS NEEDED
Status: DISCONTINUED | OUTPATIENT
Start: 2022-09-21 | End: 2022-09-22 | Stop reason: HOSPADM

## 2022-09-21 RX ORDER — IBUPROFEN 800 MG/1
800 TABLET ORAL EVERY 6 HOURS PRN
Status: DISCONTINUED | OUTPATIENT
Start: 2022-09-21 | End: 2022-09-22 | Stop reason: HOSPADM

## 2022-09-21 RX ORDER — ATORVASTATIN CALCIUM 40 MG/1
40 TABLET, FILM COATED ORAL DAILY
Status: DISCONTINUED | OUTPATIENT
Start: 2022-09-21 | End: 2022-09-22 | Stop reason: HOSPADM

## 2022-09-21 RX ORDER — SODIUM CHLORIDE 9 MG/ML
75 INJECTION, SOLUTION INTRAVENOUS CONTINUOUS
Status: ACTIVE | OUTPATIENT
Start: 2022-09-21 | End: 2022-09-21

## 2022-09-21 RX ADMIN — SODIUM CHLORIDE 50 ML/HR: 9 INJECTION, SOLUTION INTRAVENOUS at 08:09

## 2022-09-21 RX ADMIN — Medication 10 ML: at 20:32

## 2022-09-21 RX ADMIN — BRINZOLAMIDE 1 DROP: 10 SUSPENSION/ DROPS OPHTHALMIC at 20:32

## 2022-09-22 ENCOUNTER — APPOINTMENT (OUTPATIENT)
Dept: CARDIOLOGY | Facility: HOSPITAL | Age: 67
End: 2022-09-22

## 2022-09-22 VITALS
SYSTOLIC BLOOD PRESSURE: 97 MMHG | DIASTOLIC BLOOD PRESSURE: 42 MMHG | HEIGHT: 71 IN | RESPIRATION RATE: 16 BRPM | BODY MASS INDEX: 28.14 KG/M2 | OXYGEN SATURATION: 97 % | TEMPERATURE: 98.2 F | WEIGHT: 201 LBS | HEART RATE: 84 BPM

## 2022-09-22 LAB
ANION GAP SERPL CALCULATED.3IONS-SCNC: 11 MMOL/L (ref 5–15)
ANISOCYTOSIS BLD QL: NORMAL
BASOPHILS # BLD AUTO: 0.07 10*3/MM3 (ref 0–0.2)
BASOPHILS NFR BLD AUTO: 1 % (ref 0–1.5)
BH CV DISTAL RIGHT ICA HIDDEN LRR: 1 CM
BH CV MID RIGHT ICA HIDDEN LRR: 1 CM
BH CV RIGHT CCA HIDDEN LRR: 1 CM/S
BH CV RIGHT MID CCA HIDDEN LRR: 1
BH CV VAS CAROTID RIGHT DISTAL STENT EDV: 69 CM/S
BH CV VAS CAROTID RIGHT DISTAL STENT PSV: 201 CM/S
BH CV VAS CAROTID RIGHT DISTAL TO STENT NATIVE VESSEL E: 92.2 CM/S
BH CV VAS CAROTID RIGHT DISTAL TO STENT PSV: 237 CM/S
BH CV VAS CAROTID RIGHT MID STENT EDV: 83.1 CM/S
BH CV VAS CAROTID RIGHT MID STENT PSV: 224 CM/S
BH CV VAS CAROTID RIGHT PROXIMAL STENT EDV: 78.4 CM/S
BH CV VAS CAROTID RIGHT PROXIMAL STENT PSV: 220 CM/S
BH CV VAS CAROTID RIGHT STENT NATIVE VESSEL PROXIMAL EDV: 78.4 CM/S
BH CV VAS CAROTID RIGHT STENT NATIVE VESSEL PROXIMAL PS: 213 CM/S
BH CV XLRA MEAS LEFT DIST CCA EDV: 123 CM/SEC
BH CV XLRA MEAS LEFT DIST CCA PSV: 360 CM/SEC
BH CV XLRA MEAS LEFT DIST ICA PSV: 0 CM/SEC
BH CV XLRA MEAS LEFT ICA/CCA RATIO: 0
BH CV XLRA MEAS LEFT MID CCA EDV: 184 CM/SEC
BH CV XLRA MEAS LEFT MID CCA PSV: 454 CM/SEC
BH CV XLRA MEAS LEFT MID ICA PSV: 0 CM/SEC
BH CV XLRA MEAS LEFT PROX CCA EDV: 20.3 CM/SEC
BH CV XLRA MEAS LEFT PROX CCA PSV: 47.2 CM/SEC
BH CV XLRA MEAS LEFT PROX ECA EDV: 19.7 CM/SEC
BH CV XLRA MEAS LEFT PROX ECA PSV: 61.9 CM/SEC
BH CV XLRA MEAS LEFT PROX ICA PSV: 0 CM/SEC
BH CV XLRA MEAS LEFT PROX SCLA PSV: 485 CM/SEC
BH CV XLRA MEAS LEFT VERTEBRAL A EDV: 10.1 CM/SEC
BH CV XLRA MEAS LEFT VERTEBRAL A PSV: 35.9 CM/SEC
BH CV XLRA MEAS RIGHT DIST CCA EDV: 51.7 CM/SEC
BH CV XLRA MEAS RIGHT DIST CCA PSV: 157 CM/SEC
BH CV XLRA MEAS RIGHT DIST ICA EDV: 92.2 CM/SEC
BH CV XLRA MEAS RIGHT DIST ICA PSV: 237 CM/SEC
BH CV XLRA MEAS RIGHT ICA/CCA RATIO: 0.94
BH CV XLRA MEAS RIGHT MID CCA EDV: 103 CM/SEC
BH CV XLRA MEAS RIGHT MID CCA PSV: 233 CM/SEC
BH CV XLRA MEAS RIGHT MID ICA EDV: 83.1 CM/SEC
BH CV XLRA MEAS RIGHT MID ICA PSV: 224 CM/SEC
BH CV XLRA MEAS RIGHT PROX CCA EDV: 101 CM/SEC
BH CV XLRA MEAS RIGHT PROX CCA PSV: 245 CM/SEC
BH CV XLRA MEAS RIGHT PROX ECA EDV: 165 CM/SEC
BH CV XLRA MEAS RIGHT PROX ECA PSV: 646 CM/SEC
BH CV XLRA MEAS RIGHT PROX ICA EDV: 78.4 CM/SEC
BH CV XLRA MEAS RIGHT PROX ICA PSV: 220 CM/SEC
BH CV XLRA MEAS RIGHT PROX SCLA PSV: 182 CM/SEC
BH CV XLRA MEAS RIGHT VERTEBRAL A EDV: 34.9 CM/SEC
BH CV XLRA MEAS RIGHT VERTEBRAL A PSV: 111 CM/SEC
BH CVPROX RIGHT ICA HIDDEN LRR: 1 CM
BUN SERPL-MCNC: 12 MG/DL (ref 8–23)
BUN/CREAT SERPL: 11.7 (ref 7–25)
CALCIUM SPEC-SCNC: 8.7 MG/DL (ref 8.6–10.5)
CHLORIDE SERPL-SCNC: 104 MMOL/L (ref 98–107)
CO2 SERPL-SCNC: 23 MMOL/L (ref 22–29)
CREAT SERPL-MCNC: 1.03 MG/DL (ref 0.76–1.27)
DEPRECATED RDW RBC AUTO: 44.2 FL (ref 37–54)
EGFRCR SERPLBLD CKD-EPI 2021: 79.1 ML/MIN/1.73
EOSINOPHIL # BLD AUTO: 0.17 10*3/MM3 (ref 0–0.4)
EOSINOPHIL NFR BLD AUTO: 2.5 % (ref 0.3–6.2)
ERYTHROCYTE [DISTWIDTH] IN BLOOD BY AUTOMATED COUNT: 18.4 % (ref 12.3–15.4)
GLUCOSE SERPL-MCNC: 108 MG/DL (ref 65–99)
HCT VFR BLD AUTO: 31.3 % (ref 37.5–51)
HGB BLD-MCNC: 8.5 G/DL (ref 13–17.7)
IMM GRANULOCYTES # BLD AUTO: 0.02 10*3/MM3 (ref 0–0.05)
IMM GRANULOCYTES NFR BLD AUTO: 0.3 % (ref 0–0.5)
LYMPHOCYTES # BLD AUTO: 0.92 10*3/MM3 (ref 0.7–3.1)
LYMPHOCYTES NFR BLD AUTO: 13.7 % (ref 19.6–45.3)
Lab: 1
MAXIMAL PREDICTED HEART RATE: 152 BPM
MCH RBC QN AUTO: 18.6 PG (ref 26.6–33)
MCHC RBC AUTO-ENTMCNC: 27.2 G/DL (ref 31.5–35.7)
MCV RBC AUTO: 68.6 FL (ref 79–97)
MICROCYTES BLD QL: NORMAL
MONOCYTES # BLD AUTO: 0.68 10*3/MM3 (ref 0.1–0.9)
MONOCYTES NFR BLD AUTO: 10.1 % (ref 5–12)
NEUTROPHILS NFR BLD AUTO: 4.85 10*3/MM3 (ref 1.7–7)
NEUTROPHILS NFR BLD AUTO: 72.4 % (ref 42.7–76)
NRBC BLD AUTO-RTO: 0 /100 WBC (ref 0–0.2)
OVALOCYTES BLD QL SMEAR: NORMAL
PLAT MORPH BLD: NORMAL
PLATELET # BLD AUTO: 221 10*3/MM3 (ref 140–450)
PMV BLD AUTO: 10.1 FL (ref 6–12)
POTASSIUM SERPL-SCNC: 4.1 MMOL/L (ref 3.5–5.2)
RBC # BLD AUTO: 4.56 10*6/MM3 (ref 4.14–5.8)
SODIUM SERPL-SCNC: 138 MMOL/L (ref 136–145)
STRESS TARGET HR: 129 BPM
WBC MORPH BLD: NORMAL
WBC NRBC COR # BLD: 6.71 10*3/MM3 (ref 3.4–10.8)

## 2022-09-22 PROCEDURE — 93880 EXTRACRANIAL BILAT STUDY: CPT | Performed by: INTERNAL MEDICINE

## 2022-09-22 PROCEDURE — 85007 BL SMEAR W/DIFF WBC COUNT: CPT | Performed by: RADIOLOGY

## 2022-09-22 PROCEDURE — 80048 BASIC METABOLIC PNL TOTAL CA: CPT | Performed by: RADIOLOGY

## 2022-09-22 PROCEDURE — 93880 EXTRACRANIAL BILAT STUDY: CPT

## 2022-09-22 PROCEDURE — 99231 SBSQ HOSP IP/OBS SF/LOW 25: CPT | Performed by: INTERNAL MEDICINE

## 2022-09-22 PROCEDURE — 85025 COMPLETE CBC W/AUTO DIFF WBC: CPT | Performed by: RADIOLOGY

## 2022-09-22 RX ADMIN — BRINZOLAMIDE 1 DROP: 10 SUSPENSION/ DROPS OPHTHALMIC at 08:00

## 2022-09-22 RX ADMIN — LEVOTHYROXINE SODIUM 137 MCG: 137 TABLET ORAL at 08:00

## 2022-09-22 RX ADMIN — BUPRENORPHINE AND NALOXONE 2 TABLET: 8; 2 TABLET SUBLINGUAL at 08:00

## 2022-09-22 RX ADMIN — ATORVASTATIN CALCIUM 40 MG: 40 TABLET, FILM COATED ORAL at 08:00

## 2022-09-22 RX ADMIN — CLOPIDOGREL BISULFATE 75 MG: 75 TABLET ORAL at 00:06

## 2022-09-22 RX ADMIN — ASPIRIN 81 MG 81 MG: 81 TABLET ORAL at 08:00

## 2022-10-09 ENCOUNTER — APPOINTMENT (OUTPATIENT)
Dept: PREADMISSION TESTING | Facility: HOSPITAL | Age: 67
End: 2022-10-09

## 2022-10-12 ENCOUNTER — OFFICE VISIT (OUTPATIENT)
Dept: NEUROSURGERY | Facility: CLINIC | Age: 67
End: 2022-10-12

## 2022-10-12 VITALS — HEIGHT: 71 IN | BODY MASS INDEX: 28.14 KG/M2 | WEIGHT: 201 LBS

## 2022-10-12 DIAGNOSIS — I65.21 CAROTID STENOSIS, ASYMPTOMATIC, RIGHT: Primary | ICD-10-CM

## 2022-10-12 PROCEDURE — 99441 PR PHYS/QHP TELEPHONE EVALUATION 5-10 MIN: CPT | Performed by: RADIOLOGY

## 2022-10-12 NOTE — PROGRESS NOTES
"NAME: SHAINA PICHARDO   DOS: 10/12/2022  : 1955  PCP: Ernie Hsieh MD    Chief Complaint:    Chief Complaint   Patient presents with   • Carotid Artery Disease     S/p right carotid stent placement     You have chosen to receive care through a telephone visit. Do you consent to use a telephone visit for your medical care today? Yes.    History of Present Illness:  67 y.o. male who is well-known to the neuro interventional service, having undergone angioplasty/stent placement for high-grade right carotid stenosis on 2021.  Mr. Pichardo has a history of left carotid occlusion, as well as prior surgery/radiation to the neck for laryngeal cancer, and thus was a \"high surgical risk\" for endarterectomy.  He is done well following his procedure, denying any new stroke or TIA-like symptoms.  However, he has had progressively increasing velocities at the proximal margin of his right carotid stent construct (with peak systolic velocities in excess of 700 cm/s), and thus he presented for angioplasty/stent placement for his recurrent right carotid stenosis on 2022.  Carotid angiography confirmed a high-grade (greater than 70%) lesion at the proximal margin of his right carotid stent construct.  This responded well to angioplasty/stent placement, restoring a normal caliber lumen and improved perfusion to the bilateral cerebral hemispheres.  He made an uneventful recovery in the neuro ICU, and was discharged home on 2022 at his neurologic baseline.     Mr. Pichardo is done well following his hospitalization, denying any new stroke or TIA-like symptoms.  He is tolerating a dual antiplatelet regimen well.  He presents today for routine follow-up.     Past Medical History:  Past Medical History:   Diagnosis Date   • Carotid artery occlusion     Left carotid occlusion   • Carotid stenosis, asymptomatic, right 2021    Status post right carotid stent   • Disease of thyroid gland    • Hyperlipidemia  "   • Hypertension    • Laryngeal cancer (HCC)        Past Surgical History:  Past Surgical History:   Procedure Laterality Date   • CAROTID STENT Bilateral 04/23/2021    Procedure: Carotid Stent;  Surgeon: Anjel Covarrubias MD;  Location:  [facility];  Service: Interventional Radiology;  Laterality: Bilateral;   • CAROTID STENT      09/21/2022 PER DR. COVARRUBIAS   • CAROTID STENT Right 9/21/2022    Procedure: Carotid Stent;  Surgeon: Anjel Covarrubias MD;  Location:  [facility];  Service: Interventional Radiology;  Laterality: Right;   • ESOPHAGEAL DILATATION      EVERY 3-4 MONTHS   • INTERVENTIONAL RADIOLOGY PROCEDURE N/A 09/29/2020    Procedure: IR MECHANICAL THROMBECTOMY - PRIMARY;  Surgeon: Aidan Plasencia MD;  Location:  [facility];  Service: Interventional Radiology;  Laterality: N/A;   • INTERVENTIONAL RADIOLOGY PROCEDURE Bilateral 04/14/2022    Procedure: Carotid Cerebral Angiogram;  Surgeon: Anjel Covarrubias MD;  Location:  [facility];  Service: Interventional Radiology;  Laterality: Bilateral;   • MANDIBLE SURGERY     • TRACHEOSTOMY         Review of Systems:        Review of Systems   Constitutional: Negative for activity change, appetite change, chills, diaphoresis, fatigue, fever and unexpected weight change.   HENT: Negative for congestion, dental problem, drooling, ear discharge, ear pain, facial swelling, hearing loss, mouth sores, nosebleeds, postnasal drip, rhinorrhea, sinus pressure, sinus pain, sneezing, sore throat, tinnitus, trouble swallowing and voice change.    Eyes: Negative for photophobia, pain, discharge, redness, itching and visual disturbance.   Respiratory: Negative for apnea, cough, choking, chest tightness, shortness of breath, wheezing and stridor.    Cardiovascular: Negative for chest pain, palpitations and leg swelling.   Gastrointestinal: Negative for abdominal distention, abdominal pain, anal bleeding,  blood in stool, constipation, diarrhea, nausea, rectal pain and vomiting.   Endocrine: Negative for cold intolerance, heat intolerance, polydipsia, polyphagia and polyuria.   Genitourinary: Negative for decreased urine volume, difficulty urinating, dysuria, enuresis, flank pain, frequency, genital sores, hematuria and urgency.   Musculoskeletal: Negative for arthralgias, back pain, gait problem, joint swelling, myalgias, neck pain and neck stiffness.   Skin: Negative for color change, pallor, rash and wound.   Allergic/Immunologic: Negative for environmental allergies, food allergies and immunocompromised state.   Neurological: Negative for dizziness, tremors, seizures, syncope, facial asymmetry, speech difficulty, weakness, light-headedness, numbness and headaches.   Hematological: Negative for adenopathy. Does not bruise/bleed easily.   Psychiatric/Behavioral: Negative for agitation, behavioral problems, confusion, decreased concentration, dysphoric mood, hallucinations, self-injury, sleep disturbance and suicidal ideas. The patient is not nervous/anxious and is not hyperactive.         Medications    Current Outpatient Medications:   •  aspirin 81 MG chewable tablet, Chew 1 tablet Daily., Disp: 30 tablet, Rfl: 5  •  atorvastatin (Lipitor) 40 MG tablet, Take 1 tablet by mouth Daily., Disp: 30 tablet, Rfl: 11  •  brinzolamide (AZOPT) 1 % ophthalmic suspension, , Disp: , Rfl:   •  buprenorphine-naloxone (SUBOXONE) 8-2 MG per SL tablet, Place 2 tablets under the tongue Daily., Disp: , Rfl:   •  clopidogrel (Plavix) 75 MG tablet, Take 1 tablet by mouth Daily., Disp: 30 tablet, Rfl: 11  •  Combigan 0.2-0.5 % ophthalmic solution, INSTILL 1 DROP IN LEFT EYE TWICE DAILY, Disp: , Rfl:   •  ibuprofen (ADVIL,MOTRIN) 800 MG tablet, Take 800 mg by mouth Every 6 (Six) Hours As Needed., Disp: , Rfl:   •  levothyroxine (SYNTHROID, LEVOTHROID) 137 MCG tablet, Take 137 mcg by mouth Daily., Disp: , Rfl:   •  Lumigan 0.01 %  "ophthalmic drops, INSTILL 1 DROP IN LEFT EYE AT BEDTIME, Disp: , Rfl:   •  fludrocortisone 0.1 MG tablet, TAKE 1 TO 2 TABLETS BY MOUTH AS NEEDED FOR SBP<100. AVOID LYING DOWN FOR 2 HOURS AFTER TAKING THE MEDICATION TO PREVENT SUPINE HIGH BLOOD PRESSURE, Disp: 60 tablet, Rfl: 3    Allergies:  No Known Allergies    Social Hx:  Social History     Tobacco Use   • Smoking status: Former     Packs/day: 2.00     Years: 30.00     Pack years: 60.00     Types: Cigarettes     Quit date:      Years since quittin.7   • Smokeless tobacco: Never   Vaping Use   • Vaping Use: Never used   Substance Use Topics   • Alcohol use: Not Currently   • Drug use: Never       Family Hx:  Family History   Problem Relation Age of Onset   • Atrial fibrillation Mother    • Cancer Father    • Cancer Maternal Grandmother    • Throat cancer Maternal Grandmother    • Cancer Maternal Grandfather    • Heart attack Paternal Grandmother    • No Known Problems Paternal Grandfather    • No Known Problems Other    • Ataxia Neg Hx    • Chorea Neg Hx    • Dementia Neg Hx    • Mental retardation Neg Hx    • Migraines Neg Hx    • Multiple sclerosis Neg Hx    • Neurofibromatosis Neg Hx    • Neuropathy Neg Hx    • Parkinsonism Neg Hx    • Seizures Neg Hx    • Stroke Neg Hx        Review of Imaging:  No new imaging.    Physical Examination:  This examination is being performed as part of a \"telephone\" visit.  Mr. Shay has a history of laryngectomy, but is easily understandable with a vocal \"assist\" device.  He has chronic vision loss in the left eye, but denies any new vision changes.  He denies any new unilateral weakness or numbness, specifically no new stroke or TIA-like symptoms.  He is ambulating unassisted.  Tolerating p.o. intake very well.    Diagnoses/Plan:    Mr. Shay is a 67 y.o. male with history of left carotid occlusion and a head neck cancer (with radiation therapy).  He is undergone prior right carotid angioplasty/stent placement " for symptomatic disease, and did well for many years.  However, most recent carotid duplex demonstrated increasing velocities consistent with a recurrent, high-grade right carotid stenosis, and subsequent catheter angiogram on 9/21/2022 demonstrated progression of disease with high-grade (greater than 70%) lesion at the proximal margin of his stent construct.  This responded well to angioplasty/stent placement, restoring a normal caliber lumen and improved perfusion to the bilateral cerebral hemispheres.  Mr. Shay is done well following his procedure, denying any new stroke or TIA-like symptoms.  He is tolerating a dual antiplatelet/statin regimen quite well.  I plan on following up with Mr. Shay in 6 months time with carotid duplex, to ensure stability and exclude any progression of disease that might necessitate further treatment/intervention.  During the interim, he will contact our office in/or call 911 if he experiences any new stroke or TIA-like symptoms.    Copied text and portions of the note have been reviewed and are accurate as of 10/12/22.    Approximately 8 minutes was utilized during this telephone visit, to include (but not limited to): Review of signs/symptoms of stroke, reviewing current medical regimen, and establishing follow-up care.

## 2022-10-25 RX ORDER — FLUDROCORTISONE ACETATE 0.1 MG/1
TABLET ORAL
Qty: 60 TABLET | Refills: 3 | Status: SHIPPED | OUTPATIENT
Start: 2022-10-25

## 2022-11-02 ENCOUNTER — OFFICE VISIT (OUTPATIENT)
Dept: NEUROSURGERY | Facility: CLINIC | Age: 67
End: 2022-11-02

## 2022-11-02 VITALS — WEIGHT: 201 LBS | HEIGHT: 71 IN | BODY MASS INDEX: 28.14 KG/M2

## 2022-11-02 DIAGNOSIS — I65.21 CAROTID STENOSIS, ASYMPTOMATIC, RIGHT: Primary | ICD-10-CM

## 2022-11-02 PROCEDURE — 99024 POSTOP FOLLOW-UP VISIT: CPT | Performed by: RADIOLOGY

## 2022-11-02 RX ORDER — ASCORBIC ACID 1000 MG
TABLET ORAL
COMMUNITY
Start: 2022-10-13

## 2022-11-02 RX ORDER — FERROUS SULFATE 325(65) MG
1 TABLET ORAL 2 TIMES DAILY
COMMUNITY
Start: 2022-10-13

## 2022-11-02 NOTE — PROGRESS NOTES
"NAME: SHAINA PICHARDO   DOS: 2022  : 1955  PCP: Ernie Hsieh MD    Chief Complaint:    Chief Complaint   Patient presents with   • Carotid Artery Disease     S/p stent     You have chosen to receive care through a telephone visit. Do you consent to use a telephone visit for your medical care today? Yes    History of Present Illness:  67 y.o. male who is well-known to the neuro interventional service, having undergone angioplasty/stent placement for high-grade right carotid stenosis on 2021.  Mr. Pichardo has a history of left carotid occlusion, as well as prior surgery/radiation to the neck for laryngeal cancer, and thus was a \"high surgical risk\" for endarterectomy.  He is done well following his procedure, denying any new stroke or TIA-like symptoms.  However, he has had progressively increasing velocities at the proximal margin of his right carotid stent construct (with peak systolic velocities in excess of 700 cm/s), and thus he presented for angioplasty/stent placement for his recurrent right carotid stenosis on 2022.  Carotid angiography confirmed a high-grade (greater than 70%) lesion at the proximal margin of his right carotid stent construct.  This responded well to angioplasty/stent placement, restoring a normal caliber lumen and improved perfusion to the bilateral cerebral hemispheres.  He made an uneventful recovery in the neuro ICU, and was discharged home on 2022 at his neurologic baseline.      Mr. Pichardo is done well following his hospitalization, denying any new stroke or TIA-like symptoms.  He is tolerating a dual antiplatelet regimen well.  He presents today for routine follow-up.     Past Medical History:  Past Medical History:   Diagnosis Date   • Carotid artery occlusion     Left carotid occlusion   • Carotid stenosis, asymptomatic, right 2021    Status post right carotid stent   • Disease of thyroid gland    • Hyperlipidemia    • Hypertension    • " Laryngeal cancer (HCC)        Past Surgical History:  Past Surgical History:   Procedure Laterality Date   • CAROTID STENT Bilateral 04/23/2021    Procedure: Carotid Stent;  Surgeon: Anjel Covarrubias MD;  Location:  [facility];  Service: Interventional Radiology;  Laterality: Bilateral;   • CAROTID STENT      09/21/2022 PER DR. COVARRUBIAS   • CAROTID STENT Right 9/21/2022    Procedure: Carotid Stent;  Surgeon: Anjel Covarrubias MD;  Location:  [facility];  Service: Interventional Radiology;  Laterality: Right;   • ESOPHAGEAL DILATATION      EVERY 3-4 MONTHS   • INTERVENTIONAL RADIOLOGY PROCEDURE N/A 09/29/2020    Procedure: IR MECHANICAL THROMBECTOMY - PRIMARY;  Surgeon: Aidan Plasencia MD;  Location:  [facility];  Service: Interventional Radiology;  Laterality: N/A;   • INTERVENTIONAL RADIOLOGY PROCEDURE Bilateral 04/14/2022    Procedure: Carotid Cerebral Angiogram;  Surgeon: Anjel Covarrubias MD;  Location:  [facility];  Service: Interventional Radiology;  Laterality: Bilateral;   • MANDIBLE SURGERY     • TRACHEOSTOMY         Review of Systems:        Review of Systems   Constitutional: Negative for activity change, appetite change, chills, diaphoresis, fatigue, fever and unexpected weight change.   HENT: Negative for congestion, dental problem, drooling, ear discharge, ear pain, facial swelling, hearing loss, mouth sores, nosebleeds, postnasal drip, rhinorrhea, sinus pressure, sinus pain, sneezing, sore throat, tinnitus, trouble swallowing and voice change.    Eyes: Negative for photophobia, pain, discharge, redness, itching and visual disturbance.   Respiratory: Negative for apnea, cough, choking, chest tightness, shortness of breath, wheezing and stridor.    Cardiovascular: Negative for chest pain, palpitations and leg swelling.   Gastrointestinal: Negative for abdominal distention, abdominal pain, anal bleeding, blood in stool,  constipation, diarrhea, nausea, rectal pain and vomiting.   Endocrine: Negative for cold intolerance, heat intolerance, polydipsia, polyphagia and polyuria.   Genitourinary: Negative for decreased urine volume, difficulty urinating, dysuria, enuresis, flank pain, frequency, genital sores, hematuria, penile discharge, penile pain, penile swelling, scrotal swelling, testicular pain and urgency.   Musculoskeletal: Negative for arthralgias, back pain, gait problem, joint swelling, myalgias, neck pain and neck stiffness.   Skin: Negative for color change, pallor, rash and wound.   Allergic/Immunologic: Negative for environmental allergies, food allergies and immunocompromised state.   Neurological: Negative for dizziness, tremors, seizures, syncope, facial asymmetry, speech difficulty, weakness, light-headedness, numbness and headaches.   Hematological: Negative for adenopathy. Does not bruise/bleed easily.   Psychiatric/Behavioral: Negative for agitation, behavioral problems, confusion, decreased concentration, dysphoric mood, hallucinations, self-injury, sleep disturbance and suicidal ideas. The patient is not nervous/anxious and is not hyperactive.         Medications    Current Outpatient Medications:   •  aspirin 81 MG chewable tablet, Chew 1 tablet Daily., Disp: 30 tablet, Rfl: 5  •  atorvastatin (Lipitor) 40 MG tablet, Take 1 tablet by mouth Daily., Disp: 30 tablet, Rfl: 11  •  brinzolamide (AZOPT) 1 % ophthalmic suspension, , Disp: , Rfl:   •  buprenorphine-naloxone (SUBOXONE) 8-2 MG per SL tablet, Place 2 tablets under the tongue Daily., Disp: , Rfl:   •  clopidogrel (Plavix) 75 MG tablet, Take 1 tablet by mouth Daily., Disp: 30 tablet, Rfl: 11  •  Combigan 0.2-0.5 % ophthalmic solution, INSTILL 1 DROP IN LEFT EYE TWICE DAILY, Disp: , Rfl:   •  FeroSul 325 (65 Fe) MG tablet, Take 1 tablet by mouth 2 (Two) Times a Day., Disp: , Rfl:   •  fludrocortisone 0.1 MG tablet, TAKE 1 TO 2 TABLETS BY MOUTH DAILY WITH  "BREAKFAST. AVOID LYING DOWN FOR 2 HOURS AFTER TAKING THE MEDICATION TO PREVENT SUPINE HIGH BLOOD PRESSURE, Disp: 60 tablet, Rfl: 3  •  ibuprofen (ADVIL,MOTRIN) 800 MG tablet, Take 800 mg by mouth Every 6 (Six) Hours As Needed., Disp: , Rfl:   •  levothyroxine (SYNTHROID, LEVOTHROID) 137 MCG tablet, Take 137 mcg by mouth Daily., Disp: , Rfl:   •  Lumigan 0.01 % ophthalmic drops, INSTILL 1 DROP IN LEFT EYE AT BEDTIME, Disp: , Rfl:   •  YL Vitamin C 1000 MG tablet, , Disp: , Rfl:     Allergies:  No Known Allergies    Social Hx:  Social History     Tobacco Use   • Smoking status: Former     Packs/day: 2.00     Years: 30.00     Pack years: 60.00     Types: Cigarettes     Quit date:      Years since quittin.8   • Smokeless tobacco: Never   Vaping Use   • Vaping Use: Never used   Substance Use Topics   • Alcohol use: Not Currently   • Drug use: Never       Family Hx:  Family History   Problem Relation Age of Onset   • Atrial fibrillation Mother    • Cancer Father    • Cancer Maternal Grandmother    • Throat cancer Maternal Grandmother    • Cancer Maternal Grandfather    • Heart attack Paternal Grandmother    • No Known Problems Paternal Grandfather    • No Known Problems Other    • Ataxia Neg Hx    • Chorea Neg Hx    • Dementia Neg Hx    • Mental retardation Neg Hx    • Migraines Neg Hx    • Multiple sclerosis Neg Hx    • Neurofibromatosis Neg Hx    • Neuropathy Neg Hx    • Parkinsonism Neg Hx    • Seizures Neg Hx    • Stroke Neg Hx        Review of Imaging:  No new imaging.    Physical Examination:  This examination is being performed as part of a \"telephone\" visit.  Mr. Shay has a history of laryngectomy, but is easily understandable with a vocal \"assist\" device.  He has chronic vision loss in the left eye, but denies any new vision changes.  He denies any new unilateral weakness or numbness, specifically no new stroke or TIA-like symptoms.  He is ambulating unassisted.  Tolerating p.o. intake very well.  "     He is at his baseline MRS of 0    Diagnoses/Plan:    Mr. Shay is a 67 y.o. male with history of left carotid occlusion and a head neck cancer (with radiation therapy).  He is undergone prior right carotid angioplasty/stent placement for symptomatic disease, and did well for many years.  However, most recent carotid duplex demonstrated increasing velocities consistent with a recurrent, high-grade right carotid stenosis, and subsequent catheter angiogram on 9/21/2022 demonstrated progression of disease with high-grade (greater than 70%) lesion at the proximal margin of his stent construct.  This responded well to angioplasty/stent placement, restoring a normal caliber lumen and improved perfusion to the bilateral cerebral hemispheres.  Mr. Shay is done well following his procedure, denying any new stroke or TIA-like symptoms.  He is tolerating a dual antiplatelet/statin regimen quite well.  I plan on following up with Mr. Shay in April 2023 with carotid duplex, to ensure stability and exclude any progression of disease that might necessitate further treatment/intervention.  During the interim, he will contact our office in/or call 911 if he experiences any new stroke or TIA-like symptoms.     Copied text and portions of the note have been reviewed and are accurate as of 11/1/22.

## 2023-02-06 ENCOUNTER — TELEPHONE (OUTPATIENT)
Dept: NEUROSURGERY | Facility: CLINIC | Age: 68
End: 2023-02-06
Payer: MEDICARE

## 2023-02-06 NOTE — TELEPHONE ENCOUNTER
Provider:  Satish  Surgery/Procedure:  Angioplasty/stent placement    Surgery/Procedure Date:  9/21/22  Last visit:   11/2/22  Next visit: 4/17/23     Reason for call: Patient is having Mohs Surgery on 3/14/23 for 2mm skin cancer on his back. He is taking Plavix 75mg daily, and is asking if he can stop taking the medication 2 days prior to surgery to reduce bleeding risk? He is concerned because his mother had the same procedure done recently, and did not stop her blood thinner until several days after when the bleeding would not stop. I told him we typically stop it about 5 days before surgery, however with this being a smaller procedure I would verify. Also, when should he resume taking Plavix after the procedure is complete?

## 2023-04-17 ENCOUNTER — OFFICE VISIT (OUTPATIENT)
Dept: NEUROSURGERY | Facility: CLINIC | Age: 68
End: 2023-04-17
Payer: MEDICARE

## 2023-04-17 ENCOUNTER — OFFICE VISIT (OUTPATIENT)
Dept: NEUROLOGY | Facility: CLINIC | Age: 68
End: 2023-04-17
Payer: MEDICARE

## 2023-04-17 ENCOUNTER — HOSPITAL ENCOUNTER (OUTPATIENT)
Dept: CARDIOLOGY | Facility: HOSPITAL | Age: 68
Discharge: HOME | End: 2023-04-17
Admitting: RADIOLOGY
Payer: MEDICARE

## 2023-04-17 VITALS
TEMPERATURE: 96.9 F | SYSTOLIC BLOOD PRESSURE: 160 MMHG | WEIGHT: 196 LBS | OXYGEN SATURATION: 100 % | DIASTOLIC BLOOD PRESSURE: 80 MMHG | BODY MASS INDEX: 29.03 KG/M2 | HEIGHT: 69 IN | HEART RATE: 85 BPM

## 2023-04-17 VITALS — HEIGHT: 70 IN | WEIGHT: 196 LBS | BODY MASS INDEX: 28.06 KG/M2

## 2023-04-17 VITALS
SYSTOLIC BLOOD PRESSURE: 118 MMHG | OXYGEN SATURATION: 99 % | WEIGHT: 196 LBS | HEIGHT: 70 IN | DIASTOLIC BLOOD PRESSURE: 80 MMHG | BODY MASS INDEX: 28.06 KG/M2 | HEART RATE: 86 BPM

## 2023-04-17 DIAGNOSIS — R42 DIZZINESS: ICD-10-CM

## 2023-04-17 DIAGNOSIS — I63.9 ACUTE CVA (CEREBROVASCULAR ACCIDENT): ICD-10-CM

## 2023-04-17 DIAGNOSIS — I65.21 CAROTID STENOSIS, ASYMPTOMATIC, RIGHT: ICD-10-CM

## 2023-04-17 DIAGNOSIS — I65.21 CAROTID STENOSIS, ASYMPTOMATIC, RIGHT: Primary | ICD-10-CM

## 2023-04-17 DIAGNOSIS — I69.30 SEQUELAE, POST-STROKE: Primary | ICD-10-CM

## 2023-04-17 DIAGNOSIS — Z85.21 HISTORY OF LARYNGEAL CANCER: ICD-10-CM

## 2023-04-17 LAB
BH CV DISTAL RIGHT ICA HIDDEN LRR: 1 CM
BH CV MID RIGHT ICA HIDDEN LRR: 1 CM
BH CV RIGHT CCA HIDDEN LRR: 1 CM/S
BH CV RIGHT MID CCA HIDDEN LRR: 1
BH CV XLRA MEAS LEFT DIST CCA EDV: 79 CM/SEC
BH CV XLRA MEAS LEFT DIST CCA PSV: 175 CM/SEC
BH CV XLRA MEAS LEFT MID CCA EDV: 127 CM/SEC
BH CV XLRA MEAS LEFT MID CCA PSV: 354 CM/SEC
BH CV XLRA MEAS LEFT PROX CCA EDV: 34.7 CM/SEC
BH CV XLRA MEAS LEFT PROX CCA PSV: 76.3 CM/SEC
BH CV XLRA MEAS LEFT PROX ECA PSV: 73 CM/SEC
BH CV XLRA MEAS LEFT PROX SCLA PSV: 331 CM/SEC
BH CV XLRA MEAS LEFT VERTEBRAL A EDV: 45 CM/SEC
BH CV XLRA MEAS LEFT VERTEBRAL A PSV: 110 CM/SEC
BH CV XLRA MEAS RIGHT DIST CCA EDV: 52.1 CM/SEC
BH CV XLRA MEAS RIGHT DIST CCA PSV: 134 CM/SEC
BH CV XLRA MEAS RIGHT DIST ICA EDV: 76.8 CM/SEC
BH CV XLRA MEAS RIGHT DIST ICA PSV: 202 CM/SEC
BH CV XLRA MEAS RIGHT ICA/CCA RATIO: 1.5
BH CV XLRA MEAS RIGHT MID CCA EDV: 43.9 CM/SEC
BH CV XLRA MEAS RIGHT MID CCA PSV: 141 CM/SEC
BH CV XLRA MEAS RIGHT MID ICA EDV: 70 CM/SEC
BH CV XLRA MEAS RIGHT MID ICA PSV: 185 CM/SEC
BH CV XLRA MEAS RIGHT PROX CCA EDV: 96.8 CM/SEC
BH CV XLRA MEAS RIGHT PROX CCA PSV: 265 CM/SEC
BH CV XLRA MEAS RIGHT PROX ECA EDV: 201 CM/SEC
BH CV XLRA MEAS RIGHT PROX ECA PSV: 603 CM/SEC
BH CV XLRA MEAS RIGHT PROX ICA EDV: 52.1 CM/SEC
BH CV XLRA MEAS RIGHT PROX ICA PSV: 151 CM/SEC
BH CV XLRA MEAS RIGHT PROX SCLA PSV: 120 CM/SEC
BH CV XLRA MEAS RIGHT VERTEBRAL A EDV: 27.3 CM/SEC
BH CV XLRA MEAS RIGHT VERTEBRAL A PSV: 70.2 CM/SEC
BH CVPROX RIGHT ICA HIDDEN LRR: 1 CM
Lab: 1
MAXIMAL PREDICTED HEART RATE: 152 BPM
STRESS TARGET HR: 129 BPM

## 2023-04-17 PROCEDURE — 3077F SYST BP >= 140 MM HG: CPT

## 2023-04-17 PROCEDURE — 1159F MED LIST DOCD IN RCRD: CPT

## 2023-04-17 PROCEDURE — 93880 EXTRACRANIAL BILAT STUDY: CPT

## 2023-04-17 PROCEDURE — 1160F RVW MEDS BY RX/DR IN RCRD: CPT | Performed by: PSYCHIATRY & NEUROLOGY

## 2023-04-17 PROCEDURE — 3074F SYST BP LT 130 MM HG: CPT | Performed by: PSYCHIATRY & NEUROLOGY

## 2023-04-17 PROCEDURE — 3079F DIAST BP 80-89 MM HG: CPT

## 2023-04-17 PROCEDURE — 99214 OFFICE O/P EST MOD 30 MIN: CPT

## 2023-04-17 PROCEDURE — 1159F MED LIST DOCD IN RCRD: CPT | Performed by: PSYCHIATRY & NEUROLOGY

## 2023-04-17 PROCEDURE — 99214 OFFICE O/P EST MOD 30 MIN: CPT | Performed by: PSYCHIATRY & NEUROLOGY

## 2023-04-17 PROCEDURE — 3079F DIAST BP 80-89 MM HG: CPT | Performed by: PSYCHIATRY & NEUROLOGY

## 2023-04-17 PROCEDURE — 1160F RVW MEDS BY RX/DR IN RCRD: CPT

## 2023-04-17 RX ORDER — FLUDROCORTISONE ACETATE 0.1 MG/1
TABLET ORAL
Qty: 60 TABLET | Refills: 3 | Status: SHIPPED | OUTPATIENT
Start: 2023-04-17

## 2023-04-17 RX ORDER — ATORVASTATIN CALCIUM 40 MG/1
40 TABLET, FILM COATED ORAL DAILY
Qty: 90 TABLET | OUTPATIENT
Start: 2023-04-17 | End: 2024-04-16

## 2023-04-17 RX ORDER — CLOPIDOGREL BISULFATE 75 MG/1
75 TABLET ORAL DAILY
Qty: 30 TABLET | Refills: 11 | Status: SHIPPED | OUTPATIENT
Start: 2023-04-17 | End: 2024-04-16

## 2023-04-17 RX ORDER — ASPIRIN 325 MG
325 TABLET ORAL DAILY
COMMUNITY

## 2023-04-17 RX ORDER — ATORVASTATIN CALCIUM 40 MG/1
40 TABLET, FILM COATED ORAL DAILY
Qty: 30 TABLET | Refills: 11 | Status: SHIPPED | OUTPATIENT
Start: 2023-04-17 | End: 2024-04-16

## 2023-04-17 NOTE — TELEPHONE ENCOUNTER
Rx Refill Note  Requested Prescriptions     Pending Prescriptions Disp Refills   • atorvastatin (LIPITOR) 40 MG tablet [Pharmacy Med Name: ATORVASTATIN 40MG TABLETS] 90 tablet      Sig: TAKE 1 TABLET BY MOUTH DAILY      Last filled: duplicate - already sent in today  Last office visit with prescribing clinician: 8/15/2022      Next office visit with prescribing clinician: 4/17/2023     Audrey Herring Rep  04/17/23, 13:58 EDT

## 2023-04-17 NOTE — PROGRESS NOTES
"     Office Visit      Date: 2023  Patient Name: Patrick Shay  : 1955   MRN: [id number]     Chief Complaint:    Chief Complaint   Patient presents with   • Follow-up     Carotid stenosis, asymptomatic, right       History of Present Illness: Patrick Shay is a 67 y.o. male who is who is well-known to the neuro interventional service, having undergone angioplasty/stent placement for high-grade right carotid stenosis on 2021.  Mr. Shay has a history of left carotid occlusion, as well as prior surgery/radiation to the neck for laryngeal cancer, and thus was a \"high surgical risk\" for endarterectomy.  He is done well following his procedure, denying any new stroke or TIA-like symptoms.  However, he has had progressively increasing velocities at the proximal margin of his right carotid stent construct (with peak systolic velocities in excess of 700 cm/s), and thus he presented for angioplasty/stent placement for his recurrent right carotid stenosis on 2022.  Carotid angiography confirmed a high-grade (greater than 70%) lesion at the proximal margin of his right carotid stent construct.  This responded well to angioplasty/stent placement, restoring a normal caliber lumen and improved perfusion to the bilateral cerebral hemispheres.  He made an uneventful recovery in the neuro ICU, and was discharged home on 2022 at his neurologic baseline.     The patient has done well following his prior hospitalization, he denies any new stroke or TIA-like symptoms, he is tolerating his dual antiplatelet regimen well.  He is here today for follow-up with a new duplex ultrasound.  Patient states that he was recently changed from aspirin 81 mg to aspirin 325 mg, he continues with his Plavix therapy.    Subjective   Review of Systems:  Review of Systems   Constitutional: Negative for activity change, appetite change, chills, diaphoresis, fatigue, fever and unexpected weight change.   HENT: Negative " for congestion, dental problem, drooling, ear discharge, ear pain, facial swelling, hearing loss, mouth sores, nosebleeds, postnasal drip, rhinorrhea, sinus pressure, sinus pain, sneezing, sore throat, tinnitus, trouble swallowing and voice change.    Eyes: Negative for photophobia, pain, discharge, redness, itching and visual disturbance.   Respiratory: Negative for apnea, cough, choking, chest tightness, shortness of breath, wheezing and stridor.    Cardiovascular: Negative for chest pain, palpitations and leg swelling.   Gastrointestinal: Negative for abdominal distention, abdominal pain, anal bleeding, blood in stool, constipation, diarrhea, nausea, rectal pain and vomiting.   Endocrine: Negative for cold intolerance, heat intolerance, polydipsia, polyphagia and polyuria.   Genitourinary: Negative for decreased urine volume, difficulty urinating, dysuria, enuresis, flank pain, frequency, genital sores, hematuria and urgency.   Musculoskeletal: Negative for arthralgias, back pain, gait problem, joint swelling, myalgias, neck pain and neck stiffness.   Skin: Negative for color change, pallor, rash and wound.   Allergic/Immunologic: Negative for environmental allergies, food allergies and immunocompromised state.   Neurological: Positive for dizziness. Negative for tremors, seizures, syncope, facial asymmetry, speech difficulty, weakness, light-headedness, numbness and headaches.   Hematological: Negative for adenopathy. Does not bruise/bleed easily.   Psychiatric/Behavioral: Negative for agitation, behavioral problems, confusion, decreased concentration, dysphoric mood, hallucinations, self-injury, sleep disturbance and suicidal ideas. The patient is not nervous/anxious and is not hyperactive.         Past Medical History:  Past Medical History:   Diagnosis Date   • Carotid artery occlusion     Left carotid occlusion   • Carotid stenosis, asymptomatic, right 04/23/2021    Status post right carotid stent   •  Disease of thyroid gland    • Hyperlipidemia    • Hypertension    • Laryngeal cancer        Past Surgical History:  Past Surgical History:   Procedure Laterality Date   • CAROTID STENT Bilateral 04/23/2021    Procedure: Carotid Stent;  Surgeon: Anjel Covarrubias MD;  Location:  [facility];  Service: Interventional Radiology;  Laterality: Bilateral;   • CAROTID STENT      09/21/2022 PER DR. COVARRUBIAS   • CAROTID STENT Right 9/21/2022    Procedure: Carotid Stent;  Surgeon: Anjel Covarrubias MD;  Location:  [facility];  Service: Interventional Radiology;  Laterality: Right;   • ESOPHAGEAL DILATATION      EVERY 3-4 MONTHS   • INTERVENTIONAL RADIOLOGY PROCEDURE N/A 09/29/2020    Procedure: IR MECHANICAL THROMBECTOMY - PRIMARY;  Surgeon: Aidan Plasencia MD;  Location:  [facility];  Service: Interventional Radiology;  Laterality: N/A;   • INTERVENTIONAL RADIOLOGY PROCEDURE Bilateral 04/14/2022    Procedure: Carotid Cerebral Angiogram;  Surgeon: Anjel Covarrubias MD;  Location:  [facility];  Service: Interventional Radiology;  Laterality: Bilateral;   • MANDIBLE SURGERY     • TRACHEOSTOMY         Medications    Current Outpatient Medications:   •  aspirin 325 MG tablet, Take 1 tablet by mouth Daily., Disp: , Rfl:   •  atorvastatin (Lipitor) 40 MG tablet, Take 1 tablet by mouth Daily., Disp: 30 tablet, Rfl: 11  •  brinzolamide (AZOPT) 1 % ophthalmic suspension, , Disp: , Rfl:   •  buprenorphine-naloxone (SUBOXONE) 8-2 MG per SL tablet, Place 2 tablets under the tongue Daily., Disp: , Rfl:   •  clopidogrel (Plavix) 75 MG tablet, Take 1 tablet by mouth Daily., Disp: 30 tablet, Rfl: 11  •  Combigan 0.2-0.5 % ophthalmic solution, INSTILL 1 DROP IN LEFT EYE TWICE DAILY, Disp: , Rfl:   •  FeroSul 325 (65 Fe) MG tablet, Take 1 tablet by mouth 2 (Two) Times a Day. As needed, Disp: , Rfl:   •  fludrocortisone 0.1 MG tablet, TAKE 1 TO 2 TABLETS BY MOUTH DAILY  "WITH BREAKFAST. AVOID LYING DOWN FOR 2 HOURS AFTER TAKING THE MEDICATION TO PREVENT SUPINE HIGH BLOOD PRESSURE, Disp: 60 tablet, Rfl: 3  •  ibuprofen (ADVIL,MOTRIN) 800 MG tablet, Take 1 tablet by mouth Every 6 (Six) Hours As Needed., Disp: , Rfl:   •  levothyroxine (SYNTHROID, LEVOTHROID) 137 MCG tablet, Take 1 tablet by mouth Daily., Disp: , Rfl:   •  Lumigan 0.01 % ophthalmic drops, INSTILL 1 DROP IN LEFT EYE AT BEDTIME, Disp: , Rfl:   •  YL Vitamin C 1000 MG tablet, , Disp: , Rfl:     Allergies:  No Known Allergies    Social Hx:  Social History     Tobacco Use   • Smoking status: Former     Packs/day: 2.00     Years: 30.00     Pack years: 60.00     Types: Cigarettes     Quit date:      Years since quittin.3     Passive exposure: Past   • Smokeless tobacco: Never   Vaping Use   • Vaping Use: Never used   Substance Use Topics   • Alcohol use: Not Currently   • Drug use: Never       Family Hx:  Family History   Problem Relation Age of Onset   • Atrial fibrillation Mother    • Cancer Father    • Cancer Maternal Grandmother    • Throat cancer Maternal Grandmother    • Cancer Maternal Grandfather    • Heart attack Paternal Grandmother    • No Known Problems Paternal Grandfather    • No Known Problems Other    • Ataxia Neg Hx    • Chorea Neg Hx    • Dementia Neg Hx    • Mental retardation Neg Hx    • Migraines Neg Hx    • Multiple sclerosis Neg Hx    • Neurofibromatosis Neg Hx    • Neuropathy Neg Hx    • Parkinsonism Neg Hx    • Seizures Neg Hx    • Stroke Neg Hx        Objective     Vitals:    23 1506   BP: 160/80   Pulse: 85   Temp: 96.9 °F (36.1 °C)   SpO2: 100%     Body mass index is 28.94 kg/m².    Physical examination:  General Appearance:  Well developed, well nourished, well groomed, alert, and cooperative.  Patient has a history of laryngeal ectomy, but is easily understandable with a vocal \"assist\" device.  HEENT- normocephalic, atraumatic, sclera clear  Lungs-normal expansion, no " wheezing  Heart-regular rate and rhythm  Extremities-positive pulses, no edema    Neurologic Exam  WDWN  A/A/C, speech clear, attention normal, conversant, answers questions appropriately, good historian.  Has chronic vision loss in the left eye, denies any new vision changes.  Cranial nerves II through XII are intact.  Motor examination does not reveal weakness in the , upper or lower extremities.   Sensation is intact.  Gait is normal, balance is normal.   No tremors are noted.      Patient is at his neurological baseline with an MRS of 0    Review of imaging: I along with Dr. Covarrubias reviewed the patient's most recent carotid duplex ultrasound that was performed on 4/17/2023, however at the corresponding radiological report was not available for my direct interrogation.Study Findings    •     Right CCA Prox:  Stented vessel.    •     Right CCA Mid:  Stented vessel  •     Right CCA Dist:  Stented vessel.   •     Right ICA Prox:  Stented vessel.   •     Right ICA Mid:  Stented vessel.   •     Right ICA Dist:  Stented vessel.   •    Right ECA:  Plaque present.   •     Right Vertebral:  Antegrade flow noted.         •     Left CCA Prox:  Irregular heterogeneous plaque present.   •     Left CCA Mid:  Irregular heterogeneous plaque present.   •     Left CCA Dist:  Irregular heterogeneous plaque present.   •     Left Carotid Bulb:  Irregular heterogeneous plaque present.   •     Left ICA Prox:  Occluded vessel.   •     Left ICA Mid:  Occluded vessel.    •     Left ICA Dist:  Occluded vessel.   •     Left ECA:  Irregular heterogeneous plaque present.   •     Left Vertebral:  Antegrade flow noted.       Right CCA stent and ICA stent appear patent.   Elevated velocities noted at proximal RT ECA.  LT ICA is occluded     The carotid criteria was updated on March 21, 2023.  Interpretation is based upon criteria by the [employer] ([employer]) Vascular Testing Division Carotid Diagnostic Criteria  Committee, 2021    Carotid Velocities - Right Side     Systolic Diastolic   CCA Prox 265 cm/sec        96.8 cm/sec          CCA Mid 141 cm/sec        43.9 cm/sec          CCA Dist 134 cm/sec        52.1 cm/sec          ICA Prox 151 cm/sec        52.1 cm/sec          ICA Mid 185 cm/sec        70 cm/sec          ICA Dist 202 cm/sec        76.8 cm/sec           cm/sec        201 cm/sec          Vertebral 70.2 cm/sec        27.3 cm/sec          Subclavian 120 cm/sec              ICA/CCA 1.5                 Carotid Velocities - Left Side     Systolic Diastolic   CCA Prox 76.3 cm/sec        34.7 cm/sec          CCA Mid 354 cm/sec        127 cm/sec          CCA Dist 175 cm/sec        79 cm/sec          ECA 73 cm/sec              Vertebral 110 cm/sec        45 cm/sec          Subclavian 331 cm/sec                       Assessment & Plan     1. Carotid stenosis, status post right carotid stent (4/23/2021)  - Duplex Carotid Ultrasound CAR; Future    2. History of a CVA in September 2020    3. History of laryngeal cancer with complete laryngectomy      Plan: Mr. Shay is a 67 y.o. male with history of left carotid occlusion and a head neck cancer (with radiation therapy).  He is undergone prior right carotid angioplasty/stent placement for symptomatic disease, and did well for many years.  However, most recent carotid duplex demonstrated increasing velocities consistent with a recurrent, high-grade right carotid stenosis, and subsequent catheter angiogram on 9/21/2022 demonstrated progression of disease with high-grade (greater than 70%) lesion at the proximal margin of his stent construct.  This responded well to angioplasty/stent placement, restoring a normal caliber lumen and improved perfusion to the bilateral cerebral hemispheres.  Mr. Shay is done well following his procedure, denying any new stroke or TIA-like symptoms.  He is tolerating a dual antiplatelet/statin regimen quite well, and most recently just  got changed from aspirin 81 mg to aspirin 325 mg by his cardiologist.  Patient's most recent duplex carotid ultrasound looks good, with normal waveforms, mildly increased velocities, but still shows a widely patent right carotid artery stent.  We will follow-up with the patient in 6 months time with another carotid duplex ultrasound to ensure stability and exclude any progression of disease that might necessitate any further treatment/intervention.  During the interim, he will contact her office and/or 911 if he experiences any new stroke or TIA-like symptoms.    Salvador Hernandez PA-C  [facility] NE[facility]OS[facility] [address] [address]  Fax [phone]  Phone [phone]

## 2023-04-17 NOTE — PROGRESS NOTES
"Subjective:    CC: Patrick Shay is seen today  for stroke      Current visit- patient has not had any new strokelike symptoms.  After his last visit I had spoken to the vascular neurologist who had started him on Eliquis and we both agreed to take him off of it and keep him on dual antiplatelets.  In September he had a carotid angiography showed progression of disease with now \"high-grade\" stenosis at the proximal margin of his right carotid stent construct, within the mid cervical portions of the right common carotid artery, corresponding to a greater than 70% lesion.    After that he underwent angioplasty with stent placement with good flow.  He has been on aspirin 81 mg and Plavix 75 mg in addition to Lipitor 40 mg.  His last Plavix response test was elevated at 248.  His hemoglobin was 8.7, last lipid panel was as follows-TC 89, TG 54, LDL 32, HDL 44.  With regards to his postural dizziness he has not taken fludrocortisone recently.  However he states that whenever his blood pressure drops down he feels sick.    Last visit-patient denies having any strokelike symptoms.  He had a repeat carotid Doppler in March that still showed elevated velocities in the right ICA with occlusion in the left ICA.  After that he underwent a conventional diagnostic angiogram by Dr. Kaplan which showed minimal right CCA/ICA stenosis.  He told him to continue both aspirin and Plavix along with Lipitor 40 mg.  Patient also saw Dr. Mejia from cardiology who ordered a 30-day Holter monitor for him that did not show any atrial fibrillation.  He too continue with him on the same medications.  His dizziness remains well controlled and he has only had to take fludrocortisone once when he bent over and had a spell.  Of note-I reviewed Dr. Kaplan and cardiology notes    Last visit-patient had a repeat carotid Doppler in December that showed continued left ICA occlusion and a patent stent in the right ICA with increased velocities in the " right common and internal carotid artery with ICA/CCA ratio 1.3.  After that he saw Dr. Kaplan who felt that the increased velocities could be due to to compensation from his left ICA occlusion.  He will be repeating his carotid Doppler in 3 months to see if they have further worsened.  Patient denies having any new strokelike symptoms.  He continues to take aspirin 81 mg along with Eliquis and Lipitor 40 mg daily.  He also denies having any significant episodes of dizziness/lightheadedness or syncope.  He brought his blood pressure readings from home and most days his systolic blood pressure is elevated (over 150).  He takes fludrocortisone 0.1 mg only as needed for lightheadedness or if his blood pressure is too low.  Of note-I reviewed Dr. Kaplan note.    Last visit-patient's wife had called me a few weeks ago stating that patient was having spells with dizziness and lightheadedness on walking where he was turning pale and his blood pressure was dropping to the 90s/60s.  After that I had started him on fludrocortisone 0.1 mg daily 1-2 times a day which has really helped with his symptoms.  He denies having any syncopal episodes.  Also denies having any new strokelike symptoms.  Continues to take aspirin, Eliquis and Lipitor 80 mg.  His next appointment with Dr. Kaplan is in December.    Last visit-patient denies having any strokelike symptoms since his last visit. He had right carotid  angioplasty /stent placement in April and was placed on Plavix in addition to his Eliquis. He has been feeling less dizzy since the procedure. His blood pressure was also dropping down to low hence he stopped taking his amlodipine however some home readings are in the 150s. He also continues to take Lipitor 80 mg daily. He had his Holter monitor which showed a 24 beat run of SVT as well as short episodes of atrial tachycardia but no A. fib.    Last visit-since the last visit patient denies having any new strokelike symptoms.  He  continues to take aspirin along with Eliquis and Lipitor 80 mg.  He had his carotid Doppler which showed right ICA stenosis of over 70% and severe proximal left CCA stenosis with total occlusion of left ICA.  He also had a CT angiogram of the head and neck that continues to show 50% right ICA stenosis as well as occluded left CCA and proximal left ICA along its entire cervical segment, with reconstitution of the petrous segment.  The previously seen distal left MCA stenosis was not noted at this time.  He also had a Holter monitor the results of which are still pending.  His blood pressure is much better after he cut back on his amlodipine.  Most readings were between 1 10-1 30.    Initial [id number]-old male accompanied by his girlfriend with a history of hypertension, hyperlipidemia, laryngeal cancer status post tracheostomy in 1998 (uses a voice assist device), legally blind in left eye due to eye injury presents as a hospital follow-up for stroke.  Patient states that he had difficulty getting his words out on 9/29.  He was brought to the hospital where an initial CT scan of the head edema in the left frontal region. After that he had a CT angiogram of the head which showed 50 to 60% of the left cavernous carotid stenosis and superior MCA division occlusion and a CT angiogram of the neck that showed near occlusion of the mid left common carotid artery as well as possible occlusion of the left ICA from the level of the mid left ICA to the proximal petrous segment.  Also 50 to 75% stenosis of the right carotid bulb and narrowing of right ICA to between 50 to 60% noted as well as moderate right mid vertebral stenosis.  CT perfusion showed focal ischemia in the left frontotemporal region.  MRI brain showed an area of restricted diffusion in the left frontotemporal region including the left basal ganglia and insular cortex.  There was some question of whether the patient was taking Xarelto however patient denies  that and states that his mother was on Xarelto not him.  He does not have a known history of atrial fibrillation.  Was started on Eliquis 5 mg twice a day (for presumable cardio embolic source) and aspirin 81 mg daily in addition to Lipitor 80 mg daily.  His lipid panel was as follows-, TG 96, LDL 65, HDL 37.  A1c was 5.6.  He states that his speech problems have now subsided.  Of note-I personally reviewed his CT angiogram of the head and neck and his MRI brain as well as 's note    The following portions of the patient's history were reviewed today and updated as of 12/15/2020  : allergies, current medications, past family history, past medical history, past social history, past surgical history and problem list  These document will be scanned to patient's chart.      Current Outpatient Medications:   •  clopidogrel (Plavix) 75 MG tablet, Take 1 tablet by mouth Daily., Disp: 30 tablet, Rfl: 11  •  fludrocortisone 0.1 MG tablet, TAKE 1 TO 2 TABLETS BY MOUTH DAILY WITH BREAKFAST. AVOID LYING DOWN FOR 2 HOURS AFTER TAKING THE MEDICATION TO PREVENT SUPINE HIGH BLOOD PRESSURE, Disp: 60 tablet, Rfl: 3  •  aspirin 325 MG tablet, Take 1 tablet by mouth Daily., Disp: , Rfl:   •  atorvastatin (Lipitor) 40 MG tablet, Take 1 tablet by mouth Daily., Disp: 30 tablet, Rfl: 11  •  brinzolamide (AZOPT) 1 % ophthalmic suspension, , Disp: , Rfl:   •  buprenorphine-naloxone (SUBOXONE) 8-2 MG per SL tablet, Place 2 tablets under the tongue Daily., Disp: , Rfl:   •  Combigan 0.2-0.5 % ophthalmic solution, INSTILL 1 DROP IN LEFT EYE TWICE DAILY, Disp: , Rfl:   •  FeroSul 325 (65 Fe) MG tablet, Take 1 tablet by mouth 2 (Two) Times a Day. As needed, Disp: , Rfl:   •  ibuprofen (ADVIL,MOTRIN) 800 MG tablet, Take 1 tablet by mouth Every 6 (Six) Hours As Needed., Disp: , Rfl:   •  levothyroxine (SYNTHROID, LEVOTHROID) 137 MCG tablet, Take 1 tablet by mouth Daily., Disp: , Rfl:   •  Lumigan 0.01 % ophthalmic drops, INSTILL 1  DROP IN LEFT EYE AT BEDTIME, Disp: , Rfl:   •  YL Vitamin C 1000 MG tablet, , Disp: , Rfl:    Past Medical History:   Diagnosis Date   • Carotid artery occlusion     Left carotid occlusion   • Carotid stenosis, asymptomatic, right 04/23/2021    Status post right carotid stent   • Disease of thyroid gland    • Hyperlipidemia    • Hypertension    • Laryngeal cancer       Past Surgical History:   Procedure Laterality Date   • CAROTID STENT Bilateral 04/23/2021    Procedure: Carotid Stent;  Surgeon: Anjel Covarrubias MD;  Location:  [facility];  Service: Interventional Radiology;  Laterality: Bilateral;   • CAROTID STENT      09/21/2022 PER DR. COVARRUBIAS   • CAROTID STENT Right 9/21/2022    Procedure: Carotid Stent;  Surgeon: Anjel Covarrubias MD;  Location:  [facility];  Service: Interventional Radiology;  Laterality: Right;   • ESOPHAGEAL DILATATION      EVERY 3-4 MONTHS   • INTERVENTIONAL RADIOLOGY PROCEDURE N/A 09/29/2020    Procedure: IR MECHANICAL THROMBECTOMY - PRIMARY;  Surgeon: Aidan Plasencia MD;  Location:  [facility];  Service: Interventional Radiology;  Laterality: N/A;   • INTERVENTIONAL RADIOLOGY PROCEDURE Bilateral 04/14/2022    Procedure: Carotid Cerebral Angiogram;  Surgeon: Anjel Covarrubias MD;  Location:  [facility];  Service: Interventional Radiology;  Laterality: Bilateral;   • MANDIBLE SURGERY     • TRACHEOSTOMY        Family History   Problem Relation Age of Onset   • Atrial fibrillation Mother    • Cancer Father    • Cancer Maternal Grandmother    • Throat cancer Maternal Grandmother    • Cancer Maternal Grandfather    • Heart attack Paternal Grandmother    • No Known Problems Paternal Grandfather    • No Known Problems Other    • Ataxia Neg Hx    • Chorea Neg Hx    • Dementia Neg Hx    • Mental retardation Neg Hx    • Migraines Neg Hx    • Multiple sclerosis Neg Hx    • Neurofibromatosis Neg Hx    • Neuropathy Neg Hx   "  • Parkinsonism Neg Hx    • Seizures Neg Hx    • Stroke Neg Hx       Social History     Socioeconomic History   • Marital status:    Tobacco Use   • Smoking status: Former     Packs/day: 2.00     Years: 30.00     Pack years: 60.00     Types: Cigarettes     Quit date:      Years since quittin.3     Passive exposure: Past   • Smokeless tobacco: Never   Vaping Use   • Vaping Use: Never used   Substance and Sexual Activity   • Alcohol use: Not Currently   • Drug use: Never   • Sexual activity: Yes     Partners: Female     Review of Systems   Neurological: Positive for speech difficulty.   All other systems reviewed and are negative.      Objective:    /80   Pulse 86   Ht 177.8 cm (70\")   Wt 88.9 kg (196 lb)   SpO2 99%   BMI 28.12 kg/m²     Neurology Exam:    General apperance: NAD.     Mental status: Alert, awake and oriented to time place and person.    Recent and Remote memory: Intact.    Attention span and Concentration: Normal.     Language and Speech: Intact- moderate dysarthria.  Using a voice assist device to speak    Fluency, Naming , Repitition and Comprehension:  Intact    Cranial Nerves:   CN II: Visual fields Intact in right eye.  He is legally blind in left eye.  Pupils - CHANTEL  CN III, IV and VI: Extraocular movements are intact in right eye.  He could not AB duct his left eye.    CN V: Facial sensation is intact.   CN VII: Muscles of facial expression reveal no asymmetry. Intact.   CN VIII: Hearing is intact. Whispered voice intact.   CN IX and X: Palate elevates symmetrically. Intact  CN XI: Shoulder shrug is intact.   CN XII: Tongue is midline without evidence of atrophy or fasciculation.     Ophthalmoscopic exam of optic disc-poorly visualized    Motor:  Right UE muscle strength 5/5. Normal tone.     Left UE muscle strength 5/5. Normal tone.      Right LE muscle strength5/5. Normal tone.     Left LE muscle strength 5/5. Normal tone.      Sensory: Normal light touch, " vibration and pinprick sensation bilaterally.    DTRs: 2+ bilaterally in upper and lower extremities.    Babinski: Negative bilaterally.    Co-ordination: Normal finger-to-nose, heel to shin B/L.    Rhomberg: Negative.    Gait: Normal.    Cardiovascular: Regular rate and rhythm without murmur, gallop or rub.    Assessment and Plan:  1. Sequelae, post-stroke  Patient had a left MCA stroke which could have been due to artery to artery embolus secondary to left ICA occlusion (acute versus chronic) with emboli to the superior branch of left MCA versus a cardioembolic phenomenon (less likely)  His initial Holter showed SVT and atrial tachycardia.  Most recent Holter monitor did not show any atrial fibrillation  He had another right ICA carotid stent for asymptomatic severe stenosis in September 2022  He should continue Plavix but since his Plavix response test was elevated I have asked him to increase his dose of aspirin to 325 mg daily  I have asked him to continue Lipitor 40 mg LDL of less than 100.  His LDL was 32  -Goal blood pressure less than 140/90.     2.  Dizziness  Most likely due to orthostatic hypotension  I have once again told him to take fludrocortisone 0.1 mg anytime his blood pressure drops below 120/80 (since he becomes symptomatic even with slight drops in blood pressure possibly due to ICA occlusion)  I have also counseled him to keep himself well-hydrated and start wearing above-knee moderate compression stockings    Return in about 10 months (around 2/17/2024).       Tyra Barakat MD

## 2023-05-15 ENCOUNTER — OFFICE VISIT (OUTPATIENT)
Dept: CARDIOLOGY | Facility: CLINIC | Age: 68
End: 2023-05-15
Payer: MEDICARE

## 2023-05-15 VITALS
DIASTOLIC BLOOD PRESSURE: 70 MMHG | SYSTOLIC BLOOD PRESSURE: 140 MMHG | HEIGHT: 69 IN | HEART RATE: 64 BPM | BODY MASS INDEX: 28.61 KG/M2 | WEIGHT: 193.2 LBS | OXYGEN SATURATION: 95 %

## 2023-05-15 DIAGNOSIS — I67.9 CEREBROVASCULAR DISEASE: Primary | ICD-10-CM

## 2023-05-15 DIAGNOSIS — I10 PRIMARY HYPERTENSION: ICD-10-CM

## 2023-05-15 DIAGNOSIS — E78.2 MIXED HYPERLIPIDEMIA: ICD-10-CM

## 2023-05-15 PROCEDURE — 99214 OFFICE O/P EST MOD 30 MIN: CPT | Performed by: INTERNAL MEDICINE

## 2023-05-15 PROCEDURE — 3077F SYST BP >= 140 MM HG: CPT | Performed by: INTERNAL MEDICINE

## 2023-05-15 PROCEDURE — 3078F DIAST BP <80 MM HG: CPT | Performed by: INTERNAL MEDICINE

## 2023-05-15 NOTE — PROGRESS NOTES
[facility] Cardiology  Consultation H&P  Patrick Shay  1955  [address]  [place] [id number]     VISIT DATE:  05/15/23    PCP: Ernie Hsieh MD  [address] [id number]    IDENTIFICATION: A 67 y.o. male     PROBLEM LIST:    1. CVA  1. Presentation to [facility] on September 2020 with left MCA stroke secondary to left carotid occlusion -speech and facial droop.  2. 9/20 CT cerebral perfusion: Focal ischemia in left frontotemporal region no evidence of underlying core infarct.  Remaining imaging parameters suggest slow flow rate through most of traditional left MCA territory.  3. 9/20 MRI: Restricted diffusion of the left frontotemporal region including left basal ganglia and insular cortex reasons of acute infarction with minimal localized edema.  No midline shift nor mass-effect.  4. 9/20 patient was not a tPA candidate and percutaneous intervention was not feasible due to 100% occlusion of left ICA.  2. Carotid artery disease  1. 9/20 CT angiogram neck: Occluded left ICA from the level of mid left ICA to proximal petrous segment.  Near occlusion of mid left common carotid artery.  Up to 50 to 75% stenosis of right carotid bulb, tortuous and narrowed right ICA between 50 and 60% stenosis.  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  Large right vertebral artery with moderate right mid vertebral stenosis in the neck.  Probably high-grade distal left subclavian stenosis incidentally noted.  2. 1/21 carotid US: Right ICA stenosis >70%.  Left ICA total equal.  3. 4/21 status post angioplasty/stent placement for high-grade right carotid stenosis Dr Given  4. 4/22 Carotid angiogram 30% SUSANA, known  LICA  5. 9/22 restenting obstructive proximal ISS SUSANA Dr Given  6. 4/23 CUS <50% SUSANA stenosis, LICA occl  3. Paroxysmal SVT  1. 1/21 14-day Holter monitor: AVG 69, min 47, max 139.  Abnormal Holter monitor.  24 beat run of SVT.  Other short  episodes of atrial tachycardia.  No evidence of atrial fibrillation  4. Near syncope/collapse  1. 2/22 ([facility]) MCOT: A. fib 0.0%.  Underlying mechanism NSR.  VE <1%.  SVE <1%.  Normal Holter monitor.  2. 9/20 echo basal septal hypertrophy nonobstructive gradient EF 70%, trace MR  5. Labile hypertension  1. Patient taking half of home p.o. antihypertensives and managing Florinef if appreciating hypotension  6. Hyperlipidemia  1. 9/20 121/96/37/65   2. 8/22 89/54/44/32  7. GERD  8. Hypothyroidism  9. Surgical  1. History of laryngeal cancer status post laryngectomy and radiation 1998  2. Esophageal dilations  3. Mandible surgery  4. Tracheostomy      CC:  Chief Complaint   Patient presents with   • Carotid stenosis, status post right carotid stent (4/23/202   • Hypertension       Allergies  No Known Allergies    Current Medications    Current Outpatient Medications:   •  aspirin 325 MG tablet, Take 1 tablet by mouth Daily., Disp: , Rfl:   •  atorvastatin (Lipitor) 40 MG tablet, Take 1 tablet by mouth Daily., Disp: 30 tablet, Rfl: 11  •  brinzolamide (AZOPT) 1 % ophthalmic suspension, Administer 1 drop into the left eye 2 (Two) Times a Day., Disp: , Rfl:   •  buprenorphine-naloxone (SUBOXONE) 8-2 MG per SL tablet, Place 2 tablets under the tongue Daily., Disp: , Rfl:   •  clopidogrel (Plavix) 75 MG tablet, Take 1 tablet by mouth Daily., Disp: 30 tablet, Rfl: 11  •  Combigan 0.2-0.5 % ophthalmic solution, INSTILL 1 DROP IN LEFT EYE TWICE DAILY, Disp: , Rfl:   •  FeroSul 325 (65 Fe) MG tablet, Take 1 tablet by mouth 2 (Two) Times a Day. As needed, Disp: , Rfl:   •  fludrocortisone 0.1 MG tablet, TAKE 1 TO 2 TABLETS BY MOUTH DAILY WITH BREAKFAST. AVOID LYING DOWN FOR 2 HOURS AFTER TAKING THE MEDICATION TO PREVENT SUPINE HIGH BLOOD PRESSURE, Disp: 60 tablet, Rfl: 3  •  HYDROcodone-acetaminophen (HYCET) 7.5-325 MG/15ML solution, TAKE 15 ML BY MOUTH EVERY 6 TO 8 HOURS FOR 4 DAYS AS NEEDED, Disp: , Rfl:   •  ibuprofen  "(ADVIL,MOTRIN) 800 MG tablet, Take 1 tablet by mouth Every 6 (Six) Hours As Needed., Disp: , Rfl:   •  levothyroxine (SYNTHROID, LEVOTHROID) 137 MCG tablet, Take 1 tablet by mouth Daily., Disp: , Rfl:   •  Lumigan 0.01 % ophthalmic drops, INSTILL 1 DROP IN LEFT EYE AT BEDTIME, Disp: , Rfl:   •  YL Vitamin C 1000 MG tablet, Take 1 tablet by mouth Daily., Disp: , Rfl:      History of Present Illness   HPI  Patrick Shay is a 67 y.o. year old male with the above mentioned PMH who presents for yearly follow-up.  He is undergone his quarterly esophageal dilatation.  He states he has felt generally well with occasional low blood pressure infrequent utilization of fludrocortisone.  He notes no progressive chest symptoms    Vitals:    05/15/23 0924   BP: 140/70   BP Location: Right arm   Patient Position: Sitting   Pulse: 64   SpO2: 95%   Weight: 87.6 kg (193 lb 3.2 oz)   Height: 175.3 cm (69\")     Body mass index is 28.53 kg/m².     PHYSICAL EXAMINATION:  Constitutional:       Appearance: Not in distress. Frail.   HENT:         Comments: Prior radical neck dissection  Neck:      Vascular: No JVR. JVD normal.   Pulmonary:      Effort: Pulmonary effort is normal.      Breath sounds: Normal breath sounds. No wheezing. No rhonchi. No rales.   Chest:      Chest wall: Not tender to palpatation.   Cardiovascular:      PMI at left midclavicular line. Normal rate. Regular rhythm. Normal S1. Normal S2.      Murmurs: There is a systolic murmur.      No gallop. No click. No rub.   Pulses:     Intact distal pulses.   Edema:     Peripheral edema absent.   Abdominal:      General: Bowel sounds are normal.      Palpations: Abdomen is soft.      Tenderness: There is no abdominal tenderness.   Musculoskeletal: Normal range of motion.         General: No tenderness. Skin:     General: Skin is warm and dry.   Neurological:      General: No focal deficit present.      Mental Status: Alert and oriented to person, place and time.      " Comments: Speaks with assisted moist vibratory device         Diagnostic Data:  Procedures  Lab Results   Component Value Date    CHLPL 89 08/15/2022    TRIG 54 08/15/2022    HDL 44 08/15/2022     Lab Results   Component Value Date    GLUCOSE 108 (H) 09/22/2022    BUN 12 09/22/2022    CREATININE 1.03 09/22/2022     09/22/2022    K 4.1 09/22/2022     09/22/2022    CO2 23.0 09/22/2022     Lab Results   Component Value Date    HGBA1C 5.60 09/30/2020     Lab Results   Component Value Date    WBC 6.71 09/22/2022    HGB 8.5 (L) 09/22/2022    HCT 31.3 (L) 09/22/2022     09/22/2022       ASSESSMENT:   Diagnosis Plan   1. Cerebrovascular disease        2. Primary hypertension        3. Mixed hyperlipidemia            PLAN:  Carotid stenosis post PCI and stenting with  of LICA.  Follow-up carotid duplex yearly    Mixed dyslipidemia controlled on statin therapy    Hypertension labile intolerant to multiple classes of antihypertensive agents.  Echocardiogram with mild LVH noted historically.  Risk-benefit ratio patient will remain off of antihypertensive at current given his intolerance and labile low blood pressure.  I had previously given he and his family vasopressor handout.  Discussed with him need to avoid dehydrated states and becoming overheated.                No ref. provider found, thank you for referring Mr. Shay for evaluation.  I have forwarded my electronically generated recommendations to you for review.  Please do not hesitate to call with any questions.      Collins Mejia MD, [facility]

## 2023-10-16 ENCOUNTER — HOSPITAL ENCOUNTER (OUTPATIENT)
Dept: CARDIOLOGY | Facility: HOSPITAL | Age: 68
Discharge: HOME | End: 2023-10-16
Payer: MEDICARE

## 2023-10-16 ENCOUNTER — OFFICE VISIT (OUTPATIENT)
Dept: NEUROSURGERY | Facility: CLINIC | Age: 68
End: 2023-10-16
Payer: MEDICARE

## 2023-10-16 VITALS
TEMPERATURE: 97.3 F | HEIGHT: 69 IN | RESPIRATION RATE: 16 BRPM | OXYGEN SATURATION: 100 % | HEART RATE: 70 BPM | WEIGHT: 195.4 LBS | DIASTOLIC BLOOD PRESSURE: 90 MMHG | SYSTOLIC BLOOD PRESSURE: 168 MMHG | BODY MASS INDEX: 28.94 KG/M2

## 2023-10-16 VITALS — WEIGHT: 193 LBS | HEIGHT: 71 IN | BODY MASS INDEX: 27.02 KG/M2

## 2023-10-16 DIAGNOSIS — I63.9 ACUTE CVA (CEREBROVASCULAR ACCIDENT): ICD-10-CM

## 2023-10-16 DIAGNOSIS — Z85.21 HISTORY OF LARYNGEAL CANCER: ICD-10-CM

## 2023-10-16 DIAGNOSIS — I65.21 CAROTID STENOSIS, ASYMPTOMATIC, RIGHT: ICD-10-CM

## 2023-10-16 DIAGNOSIS — I65.21 CAROTID STENOSIS, ASYMPTOMATIC, RIGHT: Primary | ICD-10-CM

## 2023-10-16 LAB
BH CV MID RIGHT ICA HIDDEN LRR: 1 CM
BH CV RIGHT CCA HIDDEN LRR: 1 CM/S
BH CV RIGHT MID CCA HIDDEN LRR: 1
BH CV VAS CAROTID RIGHT DISTAL STENT EDV: 90.9 CM/S
BH CV VAS CAROTID RIGHT DISTAL STENT PSV: 243 CM/S
BH CV VAS CAROTID RIGHT DISTAL TO STENT NATIVE VESSEL E: 80 CM/S
BH CV VAS CAROTID RIGHT DISTAL TO STENT PSV: 229 CM/S
BH CV VAS CAROTID RIGHT MID STENT EDV: 75.6 CM/S
BH CV VAS CAROTID RIGHT MID STENT PSV: 207 CM/S
BH CV VAS CAROTID RIGHT PROXIMAL STENT EDV: 67.1 CM/S
BH CV VAS CAROTID RIGHT PROXIMAL STENT PSV: 185 CM/S
BH CV VAS CAROTID RIGHT STENT NATIVE VESSEL PROXIMAL EDV: 47.6 CM/S
BH CV VAS CAROTID RIGHT STENT NATIVE VESSEL PROXIMAL PS: 166 CM/S
BH CV XLRA MEAS LEFT DIST CCA EDV: 193 CM/SEC
BH CV XLRA MEAS LEFT DIST CCA PSV: 459 CM/SEC
BH CV XLRA MEAS LEFT DIST ICA PSV: 7 CM/SEC
BH CV XLRA MEAS LEFT ICA/CCA RATIO: 0.32
BH CV XLRA MEAS LEFT MID CCA EDV: 27.4 CM/SEC
BH CV XLRA MEAS LEFT MID CCA PSV: 77.4 CM/SEC
BH CV XLRA MEAS LEFT MID ICA PSV: 6.6 CM/SEC
BH CV XLRA MEAS LEFT PROX CCA EDV: 208.5 CM/SEC
BH CV XLRA MEAS LEFT PROX CCA PSV: 474.1 CM/SEC
BH CV XLRA MEAS LEFT PROX ECA EDV: 21.1 CM/SEC
BH CV XLRA MEAS LEFT PROX ECA PSV: 58.8 CM/SEC
BH CV XLRA MEAS LEFT PROX ICA EDV: 9.8 CM/SEC
BH CV XLRA MEAS LEFT PROX ICA PSV: 25.9 CM/SEC
BH CV XLRA MEAS LEFT PROX SCLA PSV: 384.1 CM/SEC
BH CV XLRA MEAS LEFT VERTEBRAL A EDV: 19.6 CM/SEC
BH CV XLRA MEAS LEFT VERTEBRAL A PSV: 53.9 CM/SEC
BH CV XLRA MEAS RIGHT CAROTID BULB EDV: 47.6 CM/SEC
BH CV XLRA MEAS RIGHT CAROTID BULB PSV: 166 CM/SEC
BH CV XLRA MEAS RIGHT DIST CCA EDV: 51.2 CM/SEC
BH CV XLRA MEAS RIGHT DIST CCA PSV: 188 CM/SEC
BH CV XLRA MEAS RIGHT DIST ICA EDV: 80 CM/SEC
BH CV XLRA MEAS RIGHT DIST ICA PSV: 229 CM/SEC
BH CV XLRA MEAS RIGHT ICA/CCA RATIO: 0.86
BH CV XLRA MEAS RIGHT MID CCA EDV: 61 CM/SEC
BH CV XLRA MEAS RIGHT MID CCA PSV: 214.6 CM/SEC
BH CV XLRA MEAS RIGHT MID ICA EDV: 75.6 CM/SEC
BH CV XLRA MEAS RIGHT MID ICA PSV: 207 CM/SEC
BH CV XLRA MEAS RIGHT PROX CCA EDV: 119.2 CM/SEC
BH CV XLRA MEAS RIGHT PROX CCA PSV: 324.6 CM/SEC
BH CV XLRA MEAS RIGHT PROX ECA EDV: 272 CM/SEC
BH CV XLRA MEAS RIGHT PROX ECA PSV: 697 CM/SEC
BH CV XLRA MEAS RIGHT PROX ICA EDV: 67.1 CM/SEC
BH CV XLRA MEAS RIGHT PROX ICA PSV: 185 CM/SEC
BH CV XLRA MEAS RIGHT PROX SCLA PSV: 122 CM/SEC
BH CV XLRA MEAS RIGHT VERTEBRAL A EDV: 27.4 CM/SEC
BH CV XLRA MEAS RIGHT VERTEBRAL A PSV: 65.1 CM/SEC
BH CVPROX RIGHT ICA HIDDEN LRR: 1 CM
LEFT ARM BP: NORMAL MMHG
RIGHT ARM BP: NORMAL MMHG

## 2023-10-16 PROCEDURE — 3080F DIAST BP >= 90 MM HG: CPT

## 2023-10-16 PROCEDURE — 1159F MED LIST DOCD IN RCRD: CPT

## 2023-10-16 PROCEDURE — 1160F RVW MEDS BY RX/DR IN RCRD: CPT

## 2023-10-16 PROCEDURE — 99213 OFFICE O/P EST LOW 20 MIN: CPT

## 2023-10-16 PROCEDURE — 93880 EXTRACRANIAL BILAT STUDY: CPT

## 2023-10-16 PROCEDURE — 93880 EXTRACRANIAL BILAT STUDY: CPT | Performed by: INTERNAL MEDICINE

## 2023-10-16 PROCEDURE — 3077F SYST BP >= 140 MM HG: CPT

## 2023-10-16 RX ORDER — VARDENAFIL HYDROCHLORIDE 20 MG/1
TABLET ORAL
COMMUNITY
Start: 2023-07-21

## 2023-10-16 RX ORDER — CYANOCOBALAMIN 1000 UG/ML
INJECTION, SOLUTION INTRAMUSCULAR; SUBCUTANEOUS
COMMUNITY
Start: 2023-09-29

## 2023-10-16 NOTE — PROGRESS NOTES
"     Office Visit      Date: 10/16/2023  Patient Name: Patrick Shay  : 1955   MRN: [id number]     Chief Complaint:    Chief Complaint   Patient presents with    Follow-up     6 months       History of Present Illness: Patrick Shay is a 68 y.o. male who is well-known patient to the neurointerventional service.  For having undergone angioplasty/stent placement for high-grade right carotid stenosis on 2021.  Mr. Shay has a history of left carotid occlusion, as well as prior surgery/radiation to the neck for laryngeal cancer, and thus was a \"high surgical risk\" for endarterectomy.  He is done well following his procedure, denying any new stroke or TIA-like symptoms.  However, he has had progressively increasing velocities at the proximal margin of his right carotid stent construct (with peak systolic velocities in excess of 700 cm/s), and thus he presented for angioplasty/stent placement for his recurrent right carotid stenosis on 2022.  Carotid angiography confirmed a high-grade (greater than 70%) lesion at the proximal margin of his right carotid stent construct.  This responded well to angioplasty/stent placement, restoring a normal caliber lumen and improved perfusion to the bilateral cerebral hemispheres.  He made an uneventful recovery in the neuro ICU, and was discharged home on 2022 at his neurologic baseline.     I last saw the patient in 2023, he has done well following his prior hospitalization and angioplasty/stent placement, he denies any new TIA or strokelike symptoms, he is tolerating his dual antiplatelet regimen of Plavix and aspirin well, he is here today for follow-up with a new carotid duplex ultrasound.    Subjective   Review of Systems:  Review of Systems   All other systems reviewed and are negative.       Past Medical History:  Past Medical History:   Diagnosis Date    Carotid artery occlusion     Left carotid occlusion    Carotid stenosis, asymptomatic, " right 04/23/2021    Status post right carotid stent    Disease of thyroid gland     Hyperlipidemia     Hypertension     Laryngeal cancer        Past Surgical History:  Past Surgical History:   Procedure Laterality Date    CAROTID STENT Bilateral 04/23/2021    Procedure: Carotid Stent;  Surgeon: Anjel Covarrubias MD;  Location:  [facility];  Service: Interventional Radiology;  Laterality: Bilateral;    CAROTID STENT      09/21/2022 PER DR. COVARRUBIAS    CAROTID STENT Right 09/21/2022    Procedure: Carotid Stent;  Surgeon: Anjel Covarrubias MD;  Location:  [facility];  Service: Interventional Radiology;  Laterality: Right;    ESOPHAGEAL DILATATION      EVERY 3-4 MONTHS    INTERVENTIONAL RADIOLOGY PROCEDURE N/A 09/29/2020    Procedure: IR MECHANICAL THROMBECTOMY - PRIMARY;  Surgeon: Aidan Plasencia MD;  Location: [email or web address];  Service: Interventional Radiology;  Laterality: N/A;    INTERVENTIONAL RADIOLOGY PROCEDURE Bilateral 04/14/2022    Procedure: Carotid Cerebral Angiogram;  Surgeon: Anjel Covarrubias MD;  Location:  [facility];  Service: Interventional Radiology;  Laterality: Bilateral;    MANDIBLE SURGERY      MOLE REMOVAL  03/2023    skin cancer on back    TRACHEOSTOMY         Medications    Current Outpatient Medications:     cyanocobalamin 1000 MCG/ML injection, INJECT 1ML IN THE MUSCLE EVERY 2 WEEKS., Disp: , Rfl:     vardenafil (LEVITRA) 20 MG tablet, TAKE 1 TABLET BY MOUTH ONCE DAILY 60 MINUTES BEFORE SEXUAL ACTIVITY AS NEEDED, Disp: , Rfl:     aspirin 325 MG tablet, Take 1 tablet by mouth Daily., Disp: , Rfl:     atorvastatin (Lipitor) 40 MG tablet, Take 1 tablet by mouth Daily., Disp: 30 tablet, Rfl: 11    brinzolamide (AZOPT) 1 % ophthalmic suspension, Administer 1 drop into the left eye 2 (Two) Times a Day. (Patient not taking: Reported on 10/16/2023), Disp: , Rfl:     buprenorphine-naloxone (SUBOXONE) 8-2 MG per SL tablet, Place  2 tablets under the tongue Daily., Disp: , Rfl:     clopidogrel (Plavix) 75 MG tablet, Take 1 tablet by mouth Daily., Disp: 30 tablet, Rfl: 11    Combigan 0.2-0.5 % ophthalmic solution, INSTILL 1 DROP IN LEFT EYE TWICE DAILY, Disp: , Rfl:     FeroSul 325 (65 Fe) MG tablet, Take 1 tablet by mouth 2 (Two) Times a Day. As needed (Patient not taking: Reported on 10/16/2023), Disp: , Rfl:     fludrocortisone 0.1 MG tablet, TAKE 1 TO 2 TABLETS BY MOUTH DAILY WITH BREAKFAST. AVOID LYING DOWN FOR 2 HOURS AFTER TAKING THE MEDICATION TO PREVENT SUPINE HIGH BLOOD PRESSURE (Patient not taking: Reported on 10/16/2023), Disp: 60 tablet, Rfl: 3    HYDROcodone-acetaminophen (HYCET) 7.5-325 MG/15ML solution, TAKE 15 ML BY MOUTH EVERY 6 TO 8 HOURS FOR 4 DAYS AS NEEDED, Disp: , Rfl:     ibuprofen (ADVIL,MOTRIN) 800 MG tablet, Take 1 tablet by mouth Every 6 (Six) Hours As Needed., Disp: , Rfl:     levothyroxine (SYNTHROID, LEVOTHROID) 137 MCG tablet, Take 1 tablet by mouth Daily., Disp: , Rfl:     Lumigan 0.01 % ophthalmic drops, INSTILL 1 DROP IN LEFT EYE AT BEDTIME, Disp: , Rfl:     YL Vitamin C 1000 MG tablet, Take 1 tablet by mouth Daily., Disp: , Rfl:     Allergies:  No Known Allergies    Social Hx:  Social History     Tobacco Use    Smoking status: Former     Packs/day: 2.00     Years: 30.00     Additional pack years: 0.00     Total pack years: 60.00     Types: Cigarettes     Quit date:      Years since quittin.8     Passive exposure: Past    Smokeless tobacco: Never   Vaping Use    Vaping Use: Never used   Substance Use Topics    Alcohol use: Not Currently    Drug use: Never       Family Hx:  Family History   Problem Relation Age of Onset    Atrial fibrillation Mother     Cancer Father     Cancer Maternal Grandmother     Throat cancer Maternal Grandmother     Cancer Maternal Grandfather     Heart attack Paternal Grandmother     No Known Problems Paternal Grandfather     No Known Problems Other     Ataxia Neg Hx      "[place] Neg Hx     Dementia Neg Hx     Mental retardation Neg Hx     Migraines Neg Hx     Multiple sclerosis Neg Hx     Neurofibromatosis Neg Hx     Neuropathy Neg Hx     Parkinsonism Neg Hx     Seizures Neg Hx     Stroke Neg Hx        Objective     Vitals:    10/16/23 1438   BP: 168/90   Pulse: 70   Resp: 16   Temp: 97.3 °F (36.3 °C)   SpO2: 100%     Body mass index is 28.86 kg/m².    Physical examination:  General Appearance:  Well developed, well nourished, well groomed, alert, and cooperative.  Patient has a history of laryngeal ectomy, but is easily understandable with a vocal \"assist\" device.  HEENT- normocephalic, atraumatic, sclera clear  Lungs-normal expansion, no wheezing  Heart-regular rate and rhythm  Extremities-positive pulses, no edema     Neurologic Exam  WDWN  A/A/C, speech clear, attention normal, conversant, answers questions appropriately, good historian.  Has chronic vision loss in the left eye, denies any new vision changes.  Cranial nerves II through XII are intact.  Motor examination does not reveal weakness in the , upper or lower extremities.   Sensation is intact.  Gait is normal, balance is normal.   No tremors are noted.  Patient has neurological baseline MRS of 0    Interpretation Summary         Right carotid stent imaging indicates moderate (50-79%) restenosis.    Left internal carotid artery is occluded.     Patient Hx Of Height, Weight, and Vitals    Height Weight BSA (Calculated - sq m) BMI (Calculated) Retired BMI (kg/m2) Pulse BP   175.3 cm (69\") 88.6 kg (195 lb 6.4 oz) 2.05 sq meters 28.8  70 168/90     Additional Study Details    The study is technically difficult for diagnosis. The quality of the study is limited due to post-operative state.  Due to patient sensitivity on right side of neck used saline bag for standoff.     Study Findings         Right CCA Prox:  Irregular heterogeneous plaque present.         Right CCA Mid:  Stented vessel       Right CCA Dist:  Stented " vessel.        Right Carotid Bulb:  Stented vessel.       Right ICA Prox:  Stented vessel.        Right ICA Mid: Stented vessel.        Right ICA Dist:  No plaque visualized.        Right ECA:  No plaque visualized.        Right Vertebral:  Antegrade flow noted.              Left CCA Prox:  Irregular heterogeneous plaque present.        Left CCA Mid: Irregular heterogeneous plaque present.        Left CCA Dist:  Irregular heterogeneous plaque present.        Left ICA Prox:  Occluded vessel.        Left ICA Mid:  Occluded vessel.         Left ICA Dist:  Occluded vessel.        Left ECA: Irregular heterogeneous plaque present.        Left Vertebral:  Antegrade flow noted.       Patient has a stent in the right CCA and a second stent in the right ICA.     CCA stent velocities:    Pre stent:174/57.3 cm/s  Prox stent: 215/61.0 cm/s  Mid stent: 188/ 51.2 cm/s  Distal stent: 166/ 47.6 cm/s  Post stent:     Study Impression        Right ECA:  Imaging indicates patent flow.       Right Vertebral:  Antegrade flow is present.       Right Other Conclusions:  Right carotid stent imaging indicates moderate (50-79%) restenosis.           Left ICA:  Imaging indicates occluded flow.        Left ECA:  Imaging indicates patent flow.       Left Vertebral:  Antegrade flow is present.     The carotid criteria was updated on March 21, 2023.  Interpretation is based upon criteria by the [employer] ([employer]) Vascular Testing Division Carotid Diagnostic Criteria Committee, 2021.     Vascular Surgical History    INTERVENTIONAL RADIOLOGY PROCEDURE N/A 09/29/2020   Procedure: IR MECHANICAL THROMBECTOMY - PRIMARY;  Surgeon: Aidan Plasencia MD;  Location:  [facility];  Service: Interventional Radiology;  Laterality: N/A;    CAROTID STENT Bilateral 04/23/2021   Procedure: Carotid Stent;  Surgeon: Anjel Covarrubias MD;  Location:  [facility];  Service: Interventional Radiology;   Laterality: Bilateral;    INTERVENTIONAL RADIOLOGY PROCEDURE Bilateral 04/14/2022   Procedure: Carotid Cerebral Angiogram;  Surgeon: Anjel Covarrubias MD;  Location:  [facility];  Service: Interventional Radiology;  Laterality: Bilateral;    CAROTID STENT     09/21/2022 PER DR. COVARRUBIAS    CAROTID STENT Right 09/21/2022   Procedure: Carotid Stent;  Surgeon: Anjel Covarrubias MD;  Location:  [facility];  Service: Interventional Radiology;  Laterality: Right;      Cardiac History    Diagnosis Date Comment Source   Carotid artery occlusion  Left carotid occlusion    Carotid stenosis, asymptomatic, right 04/23/2021 Status post right carotid stent    Hyperlipidemia      Hypertension        Social History    Tobacco Use    Former; Cigarettes: Quit 1998; 2.00 packs/day for 30.00 years   Passive Exposure: Past   Smokeless Tobacco: Never used smokeless tobacco.     Vaping Use    Never used     Blood Pressure Measurements      Right Side Left Side   Blood Pressure 152/76 mmHg       145/61 mmHg            Carotid Velocities - Right Side     Systolic Diastolic   CCA Prox 324.6 cm/sec       119.2 cm/sec         CCA Mid 214.6 cm/sec       61 cm/sec         CCA Dist 188 cm/sec       51.2 cm/sec         Bulb 166 cm/sec       47.6 cm/sec         ICA Prox 185 cm/sec       67.1 cm/sec         ICA Mid 207 cm/sec       75.6 cm/sec         ICA Dist 229 cm/sec       80 cm/sec          cm/sec       272 cm/sec         Vertebral 65.1 cm/sec       27.4 cm/sec         Subclavian 122 cm/sec             ICA/CCA 0.86                Carotid Velocities - Left Side     Systolic Diastolic   CCA Prox 474.1 cm/sec       208.5 cm/sec         CCA Mid 77.4 cm/sec       27.4 cm/sec         CCA Dist 459 cm/sec       193 cm/sec         ICA Prox 25.9 cm/sec       9.8 cm/sec         ICA Mid 6.6 cm/sec             ICA Dist 7 cm/sec             ECA 58.8 cm/sec       21.1 cm/sec         Vertebral 53.9 cm/sec       19.6 cm/sec          Subclavian 384.1 cm/sec             ICA/CCA 0.32                    Assessment & Plan     1. Carotid stenosis, status post right carotid stent (4/23/2021)  - Duplex Carotid Ultrasound CAR; Future    2. History of a CVA in September 2020    3. History of laryngeal cancer with complete laryngectomy      Plan: I discussed this patient's case with Dr. Covarrubias .This is a 68-year-old male with a history of left carotid occlusion and head and neck cancer (with radiation therapy).  He has undergone prior right carotid artery angioplasty/stent placement for symptomatic disease and did well for many years.  However, duplex carotid ultrasound demonstrated increased velocities consistent with recurrent, high-grade right carotid artery stenosis and subsequent catheter angiogram on 9/21/2022 demonstrated progression of disease high-grade (greater than 70%) lesion of the proximal margin of the stent construct.  There is responded well to angioplasty/stent placement strong and near normal caliber lumen and improved perfusion to the bilateral cerebral hemispheres.  Patient is done well following this procedure in 2022, he denies any new TIA or strokelike symptoms, he is he is tolerating his dual antiplatelet regimen of Plavix and aspirin well.  His new carotid duplex ultrasound looks stable, in comparison with prior duplex ultrasounds, mildly increased velocities in the right carotid vascular, but still shows widely patent right carotid artery stent, with a known left carotid occlusion.  Patient will follow-up in 1 years time with a new carotid duplex ultrasound to assess for any further disease progression that might necessitate any further treatment/intervention.  Patient states he will call 911 or report to nearest emergency room if he experiences any TIA or strokelike symptoms.    Salvador Hernandez PA-C  [facility]  [address]  [facility]  [id number]  Fax [phone]  Phone [phone]

## 2024-02-19 ENCOUNTER — OFFICE VISIT (OUTPATIENT)
Dept: NEUROLOGY | Facility: CLINIC | Age: 69
End: 2024-02-19
Payer: MEDICARE

## 2024-02-19 VITALS
HEART RATE: 100 BPM | DIASTOLIC BLOOD PRESSURE: 72 MMHG | HEIGHT: 69 IN | SYSTOLIC BLOOD PRESSURE: 112 MMHG | OXYGEN SATURATION: 97 % | BODY MASS INDEX: 28.73 KG/M2 | WEIGHT: 194 LBS

## 2024-02-19 DIAGNOSIS — R42 DIZZINESS: ICD-10-CM

## 2024-02-19 DIAGNOSIS — I69.30 SEQUELAE, POST-STROKE: Primary | ICD-10-CM

## 2024-02-19 PROCEDURE — 3078F DIAST BP <80 MM HG: CPT | Performed by: PSYCHIATRY & NEUROLOGY

## 2024-02-19 PROCEDURE — 3074F SYST BP LT 130 MM HG: CPT | Performed by: PSYCHIATRY & NEUROLOGY

## 2024-02-19 PROCEDURE — 99213 OFFICE O/P EST LOW 20 MIN: CPT | Performed by: PSYCHIATRY & NEUROLOGY

## 2024-02-19 PROCEDURE — 1160F RVW MEDS BY RX/DR IN RCRD: CPT | Performed by: PSYCHIATRY & NEUROLOGY

## 2024-02-19 PROCEDURE — 1159F MED LIST DOCD IN RCRD: CPT | Performed by: PSYCHIATRY & NEUROLOGY

## 2024-02-19 RX ORDER — CLOPIDOGREL BISULFATE 75 MG/1
75 TABLET ORAL DAILY
Qty: 30 TABLET | Refills: 11 | Status: SHIPPED | OUTPATIENT
Start: 2024-02-19 | End: 2025-02-18

## 2024-02-19 RX ORDER — ATORVASTATIN CALCIUM 40 MG/1
40 TABLET, FILM COATED ORAL DAILY
Qty: 30 TABLET | Refills: 11 | Status: SHIPPED | OUTPATIENT
Start: 2024-02-19 | End: 2025-02-18

## 2024-02-19 NOTE — PROGRESS NOTES
"Subjective:    CC: Patrick Shay is seen today  for stroke      Current visit-patient denies any new strokelike symptoms since his last visit.  He continues to take aspirin 325 mg daily along with Plavix and Lipitor 40 mg daily.  He had a repeat carotid Doppler in October of last year which showed restenosis of about 50 to 70% in the right ICA (with stents present in the right ICA and CCA) along with left ICA occlusion.  He saw neurosurgery and they will just be repeating her carotid Doppler in 1 year.  With regards to his lightheadedness he continues to take fludrocortisone 0.1 mg as needed.  Mainly has it while standing up.  Is keeping himself well-hydrated.    Last year-Patient has not had any new strokelike symptoms.  After his last visit I had spoken to the vascular neurologist who had started him on Eliquis and we both agreed to take him off of it and keep him on dual antiplatelets.  In September he had a carotid angiography showed progression of disease with now \"high-grade\" stenosis at the proximal margin of his right carotid stent construct, within the mid cervical portions of the right common carotid artery, corresponding to a greater than 70% lesion.    After that he underwent angioplasty with stent placement with good flow.  He has been on aspirin 81 mg and Plavix 75 mg in addition to Lipitor 40 mg.  His last Plavix response test was elevated at 248.  His hemoglobin was 8.7, last lipid panel was as follows-TC 89, TG 54, LDL 32, HDL 44.  With regards to his postural dizziness he has not taken fludrocortisone recently.  However he states that whenever his blood pressure drops down he feels sick.    Last visit-patient denies having any strokelike symptoms.  He had a repeat carotid Doppler in March that still showed elevated velocities in the right ICA with occlusion in the left ICA.  After that he underwent a conventional diagnostic angiogram by Dr. Kaplan which showed minimal right CCA/ICA stenosis.  He " told him to continue both aspirin and Plavix along with Lipitor 40 mg.  Patient also saw Dr. Mejia from cardiology who ordered a 30-day Holter monitor for him that did not show any atrial fibrillation.  He too continue with him on the same medications.  His dizziness remains well controlled and he has only had to take fludrocortisone once when he bent over and had a spell.  Of note-I reviewed Dr. Kaplan and cardiology notes    Last visit-patient had a repeat carotid Doppler in December that showed continued left ICA occlusion and a patent stent in the right ICA with increased velocities in the right common and internal carotid artery with ICA/CCA ratio 1.3.  After that he saw Dr. Kaplan who felt that the increased velocities could be due to to compensation from his left ICA occlusion.  He will be repeating his carotid Doppler in 3 months to see if they have further worsened.  Patient denies having any new strokelike symptoms.  He continues to take aspirin 81 mg along with Eliquis and Lipitor 40 mg daily.  He also denies having any significant episodes of dizziness/lightheadedness or syncope.  He brought his blood pressure readings from home and most days his systolic blood pressure is elevated (over 150).  He takes fludrocortisone 0.1 mg only as needed for lightheadedness or if his blood pressure is too low.  Of note-I reviewed Dr. Kaplan note.    Last visit-patient's wife had called me a few weeks ago stating that patient was having spells with dizziness and lightheadedness on walking where he was turning pale and his blood pressure was dropping to the 90s/60s.  After that I had started him on fludrocortisone 0.1 mg daily 1-2 times a day which has really helped with his symptoms.  He denies having any syncopal episodes.  Also denies having any new strokelike symptoms.  Continues to take aspirin, Eliquis and Lipitor 80 mg.  His next appointment with Dr. Kaplan is in December.    Last visit-patient denies having any  strokelike symptoms since his last visit. He had right carotid  angioplasty /stent placement in April and was placed on Plavix in addition to his Eliquis. He has been feeling less dizzy since the procedure. His blood pressure was also dropping down to low hence he stopped taking his amlodipine however some home readings are in the 150s. He also continues to take Lipitor 80 mg daily. He had his Holter monitor which showed a 24 beat run of SVT as well as short episodes of atrial tachycardia but no A. fib.    Last visit-since the last visit patient denies having any new strokelike symptoms.  He continues to take aspirin along with Eliquis and Lipitor 80 mg.  He had his carotid Doppler which showed right ICA stenosis of over 70% and severe proximal left CCA stenosis with total occlusion of left ICA.  He also had a CT angiogram of the head and neck that continues to show 50% right ICA stenosis as well as occluded left CCA and proximal left ICA along its entire cervical segment, with reconstitution of the petrous segment.  The previously seen distal left MCA stenosis was not noted at this time.  He also had a Holter monitor the results of which are still pending.  His blood pressure is much better after he cut back on his amlodipine.  Most readings were between 1 10-1 30.    Initial [id number] male accompanied by his girlfriend with a history of hypertension, hyperlipidemia, laryngeal cancer status post tracheostomy in 1998 (uses a voice assist device), legally blind in left eye due to eye injury presents as a hospital follow-up for stroke.  Patient states that he had difficulty getting his words out on 9/29.  He was brought to the hospital where an initial CT scan of the head edema in the left frontal region. After that he had a CT angiogram of the head which showed 50 to 60% of the left cavernous carotid stenosis and superior MCA division occlusion and a CT angiogram of the neck that showed near occlusion of the  mid left common carotid artery as well as possible occlusion of the left ICA from the level of the mid left ICA to the proximal petrous segment.  Also 50 to 75% stenosis of the right carotid bulb and narrowing of right ICA to between 50 to 60% noted as well as moderate right mid vertebral stenosis.  CT perfusion showed focal ischemia in the left frontotemporal region.  MRI brain showed an area of restricted diffusion in the left frontotemporal region including the left basal ganglia and insular cortex.  There was some question of whether the patient was taking Xarelto however patient denies that and states that his mother was on Xarelto not him.  He does not have a known history of atrial fibrillation.  Was started on Eliquis 5 mg twice a day (for presumable cardio embolic source) and aspirin 81 mg daily in addition to Lipitor 80 mg daily.  His lipid panel was as follows-, TG 96, LDL 65, HDL 37.  A1c was 5.6.  He states that his speech problems have now subsided.  Of note-I personally reviewed his CT angiogram of the head and neck and his MRI brain as well as 's note    The following portions of the patient's history were reviewed today and updated as of 12/15/2020  : allergies, current medications, past family history, past medical history, past social history, past surgical history and problem list  These document will be scanned to patient's chart.      Current Outpatient Medications:     aspirin 325 MG tablet, Take 1 tablet by mouth Daily., Disp: , Rfl:     atorvastatin (Lipitor) 40 MG tablet, Take 1 tablet by mouth Daily., Disp: 30 tablet, Rfl: 11    buprenorphine-naloxone (SUBOXONE) 8-2 MG per SL tablet, Place 2 tablets under the tongue Daily., Disp: , Rfl:     clopidogrel (Plavix) 75 MG tablet, Take 1 tablet by mouth Daily., Disp: 30 tablet, Rfl: 11    cyanocobalamin 1000 MCG/ML injection, INJECT 1ML IN THE MUSCLE EVERY 2 WEEKS., Disp: , Rfl:     FeroSul 325 (65 Fe) MG tablet, Take 1 tablet  by mouth 2 (Two) Times a Day. As needed, Disp: , Rfl:     fludrocortisone 0.1 MG tablet, TAKE 1 TO 2 TABLETS BY MOUTH DAILY WITH BREAKFAST. AVOID LYING DOWN FOR 2 HOURS AFTER TAKING THE MEDICATION TO PREVENT SUPINE HIGH BLOOD PRESSURE, Disp: 60 tablet, Rfl: 3    HYDROcodone-acetaminophen (HYCET) 7.5-325 MG/15ML solution, TAKE 15 ML BY MOUTH EVERY 6 TO 8 HOURS FOR 4 DAYS AS NEEDED, Disp: , Rfl:     ibuprofen (ADVIL,MOTRIN) 800 MG tablet, Take 1 tablet by mouth Every 6 (Six) Hours As Needed., Disp: , Rfl:     levothyroxine (SYNTHROID, LEVOTHROID) 137 MCG tablet, Take 1 tablet by mouth Daily., Disp: , Rfl:     vardenafil (LEVITRA) 20 MG tablet, TAKE 1 TABLET BY MOUTH ONCE DAILY 60 MINUTES BEFORE SEXUAL ACTIVITY AS NEEDED, Disp: , Rfl:     YL Vitamin C 1000 MG tablet, Take 1 tablet by mouth Daily., Disp: , Rfl:    Past Medical History:   Diagnosis Date    Carotid artery occlusion     Left carotid occlusion    Carotid stenosis, asymptomatic, right 04/23/2021    Status post right carotid stent    Disease of thyroid gland     Hyperlipidemia     Hypertension     Laryngeal cancer       Past Surgical History:   Procedure Laterality Date    CAROTID STENT Bilateral 04/23/2021    Procedure: Carotid Stent;  Surgeon: Anjel Covarrubias MD;  Location:  [facility];  Service: Interventional Radiology;  Laterality: Bilateral;    CAROTID STENT      09/21/2022 PER DR. COVARRUBIAS    CAROTID STENT Right 09/21/2022    Procedure: Carotid Stent;  Surgeon: Anjel Covarrubias MD;  Location:  [facility];  Service: Interventional Radiology;  Laterality: Right;    ESOPHAGEAL DILATATION      EVERY 3-4 MONTHS    INTERVENTIONAL RADIOLOGY PROCEDURE N/A 09/29/2020    Procedure: IR MECHANICAL THROMBECTOMY - PRIMARY;  Surgeon: Aidan Plasencia MD;  Location:  [facility];  Service: Interventional Radiology;  Laterality: N/A;    INTERVENTIONAL RADIOLOGY PROCEDURE Bilateral 04/14/2022    Procedure:  "Carotid Cerebral Angiogram;  Surgeon: Anjel Covarrubias MD;  Location: [facility];  Service: Interventional Radiology;  Laterality: Bilateral;    MANDIBLE SURGERY      MOLE REMOVAL  2023    skin cancer on back    TRACHEOSTOMY        Family History   Problem Relation Age of Onset    Atrial fibrillation Mother     Cancer Father     Cancer Maternal Grandmother     Throat cancer Maternal Grandmother     Cancer Maternal Grandfather     Heart attack Paternal Grandmother     No Known Problems Paternal Grandfather     No Known Problems Other     Ataxia Neg Hx     Chorea Neg Hx     Dementia Neg Hx     Mental retardation Neg Hx     Migraines Neg Hx     Multiple sclerosis Neg Hx     Neurofibromatosis Neg Hx     Neuropathy Neg Hx     Parkinsonism Neg Hx     Seizures Neg Hx     Stroke Neg Hx       Social History     Socioeconomic History    Marital status:    Tobacco Use    Smoking status: Former     Packs/day: 2.00     Years: 30.00     Additional pack years: 0.00     Total pack years: 60.00     Types: Cigarettes     Quit date:      Years since quittin.     Passive exposure: Past    Smokeless tobacco: Never   Vaping Use    Vaping Use: Never used   Substance and Sexual Activity    Alcohol use: Not Currently    Drug use: Never    Sexual activity: Yes     Partners: Female     Review of Systems   Neurological:  Positive for speech difficulty.   All other systems reviewed and are negative.      Objective:    /72   Pulse 100   Ht 175.3 cm (69\")   Wt 88 kg (194 lb)   SpO2 97%   BMI 28.65 kg/m²     Neurology Exam:    General apperance: NAD.     Mental status: Alert, awake and oriented to time place and person.    Recent and Remote memory: Intact.    Attention span and Concentration: Normal.     Language and Speech: Intact- moderate dysarthria.  Using a voice assist device to speak    Fluency, Naming , Repitition and Comprehension:  Intact    Cranial Nerves:   CN II: Visual fields Intact " in right eye.  He is legally blind in left eye.  Pupils - CHANTEL  CN III, IV and VI: Extraocular movements are intact in right eye.  He could not AB duct his left eye.    CN V: Facial sensation is intact.   CN VII: Muscles of facial expression reveal no asymmetry. Intact.   CN VIII: Hearing is intact. Whispered voice intact.   CN IX and X: Palate elevates symmetrically. Intact  CN XI: Shoulder shrug is intact.   CN XII: Tongue is midline without evidence of atrophy or fasciculation.     Ophthalmoscopic exam of optic disc-poorly visualized    Motor:  Right UE muscle strength 5/5. Normal tone.     Left UE muscle strength 5/5. Normal tone.      Right LE muscle strength5/5. Normal tone.     Left LE muscle strength 5/5. Normal tone.      Sensory: Normal light touch, vibration and pinprick sensation bilaterally.    DTRs: 2+ bilaterally in upper and lower extremities.    Babinski: Negative bilaterally.    Co-ordination: Normal finger-to-nose, heel to shin B/L.    Rhomberg: Negative.    Gait: Normal.    Cardiovascular: Regular rate and rhythm without murmur, gallop or rub.    Assessment and Plan:  1. Sequelae, post-stroke  Patient had a left MCA stroke which could have been due to artery to artery embolus secondary to left ICA occlusion (acute versus chronic) with emboli to the superior branch of left MCA versus a cardioembolic phenomenon (less likely)  His initial Holter showed SVT and atrial tachycardia.  Most recent Holter monitor did not show any atrial fibrillation  Patient has now had 2 angioplasties with stent placement in the right ICA.  Repeat carotid Doppler once again shows moderate restenosis of the right ICA.  Repeat Doppler is ordered for next year.  If it does worsen he should be considered for CEA  He should continue Plavix in addition to aspirin 325 mg daily and Lipitor 40 mg LDL of less than 100.  His LDL was 32.  Last Plavix response test was elevated  -Goal blood pressure less than 140/90.     2.   Dizziness  Most likely due to orthostatic hypotension  He should continue taking fludrocortisone 0.1 mg anytime he is dizzy or his blood pressure drops below 120/80 (since he becomes symptomatic even with slight drops in blood pressure possibly due to ICA occlusion)  I have also counseled him to keep himself well-hydrated and start wearing above-knee moderate compression stockings    Return in about 1 year (around 2/19/2025).       Tyra Barakat MD

## 2024-07-29 ENCOUNTER — OFFICE VISIT (OUTPATIENT)
Dept: CARDIOLOGY | Facility: CLINIC | Age: 69
End: 2024-07-29
Payer: MEDICARE

## 2024-07-29 VITALS
SYSTOLIC BLOOD PRESSURE: 142 MMHG | OXYGEN SATURATION: 99 % | HEIGHT: 69 IN | WEIGHT: 205 LBS | BODY MASS INDEX: 30.36 KG/M2 | DIASTOLIC BLOOD PRESSURE: 78 MMHG | HEART RATE: 65 BPM

## 2024-07-29 DIAGNOSIS — E78.5 HYPERLIPIDEMIA LDL GOAL <100: Primary | ICD-10-CM

## 2024-07-29 DIAGNOSIS — I65.21 CAROTID STENOSIS, ASYMPTOMATIC, RIGHT: ICD-10-CM

## 2024-07-29 DIAGNOSIS — R09.89 LABILE HYPERTENSION: ICD-10-CM

## 2024-07-29 PROCEDURE — 1159F MED LIST DOCD IN RCRD: CPT

## 2024-07-29 PROCEDURE — 3077F SYST BP >= 140 MM HG: CPT

## 2024-07-29 PROCEDURE — 1160F RVW MEDS BY RX/DR IN RCRD: CPT

## 2024-07-29 PROCEDURE — 99214 OFFICE O/P EST MOD 30 MIN: CPT

## 2024-07-29 PROCEDURE — 3078F DIAST BP <80 MM HG: CPT

## 2024-07-29 RX ORDER — BRINZOLAMIDE 10 MG/ML
SUSPENSION/ DROPS OPHTHALMIC
COMMUNITY
Start: 2024-05-03

## 2024-07-29 RX ORDER — BRIMONIDINE TARTRATE, TIMOLOL MALEATE 2; 5 MG/ML; MG/ML
SOLUTION/ DROPS OPHTHALMIC
COMMUNITY
Start: 2024-07-02

## 2024-07-29 NOTE — PROGRESS NOTES
[facility] Cardiology  Office Progress Note  Patrick Shay  1955  [address] [facility] [address]       Visit Date: 07/29/24    PCP: Ernie Hsieh MD  [address]  [place] [facility] [address]    IDENTIFICATION: A 68 y.o. male       PROBLEM LIST:     CVA  Presentation [facility] 2020 with left MCA stroke secondary to left carotid occlusion -speech and facial droop.  9/20 CT cerebral perfusion: Focal ischemia in left frontotemporal region no evidence of underlying core infarct.  Remaining imaging parameters suggest slow flow rate through most of traditional left MCA territory.  9/20 MRI: Restricted diffusion of the left frontotemporal region including left basal ganglia and insular cortex reasons of acute infarction with minimal localized edema.  No midline shift nor mass-effect.  9/20 patient was not a tPA candidate and percutaneous intervention was not feasible due to 100% occlusion of left ICA.  Carotid artery disease - followed by Dr. Covarrubias  9/20 CT angiogram neck: Occluded left ICA from the level of mid left ICA to proximal petrous segment.  Near occlusion of mid left common carotid artery.  Up to 50 to 75% stenosis of right carotid bulb, tortuous and narrowed right ICA between 50 and 60% stenosis.  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  Large right vertebral artery with moderate right mid vertebral stenosis in the neck.  Probably high-grade distal left subclavian stenosis incidentally noted.  1/21 carotid US: Right ICA stenosis >70%.  Left ICA total equal.  4/21 status post angioplasty/stent placement for high-grade right carotid stenosis Dr Covarrubias  4/22 Carotid angiogram 30% SUSANA, known  LICA  9/22 restenting obstructive proximal ISS SUSANA Dr Covarrubias  4/23 CUS <50% SUSANA stenosis, LICA occluded  10/23 CUS: 50-79% SUSANA stenosis, LICA occluded  Paroxysmal SVT  1/21 14-day Holter monitor: AVG 69, min 47, max 139.  Abnormal  Holter monitor.  24 beat run of SVT.  Other short episodes of atrial tachycardia.  No evidence of atrial fibrillation  Near syncope/collapse  2/22 ([facility]) MCOT: A. fib 0.0%.  Underlying mechanism NSR.  VE <1%.  SVE <1%.  Normal Holter monitor.  9/20 echo basal septal hypertrophy nonobstructive gradient EF 70%, trace MR  Labile hypertension  Off p.o. antihypertensives and utilizing Florinef if appreciating hypotension  Hyperlipidemia  9/20 121/96/37/65   8/22 89/54/44/32  GERD  Hypothyroidism  Surgical  History of laryngeal cancer status post laryngectomy and radiation 1998  Esophageal dilations  Mandible surgery  Tracheostomy        CC:   Chief Complaint   Patient presents with    Cerebrovascular disease       Allergies  No Known Allergies    Current Medications  Current Outpatient Medications   Medication Instructions    Aspirin 325 mg, Oral, Daily    atorvastatin (LIPITOR) 40 mg, Oral, Daily    brinzolamide (AZOPT) 1 % ophthalmic suspension SHAKE LIQUID AND INSTILL 1 DROP IN LEFT EYE TWICE DAILY    buprenorphine-naloxone (SUBOXONE) 8-2 MG per SL tablet 2 tablets, Sublingual, Daily    clopidogrel (PLAVIX) 75 mg, Oral, Daily    Combigan 0.2-0.5 % ophthalmic solution INSTILL 1 DROP INTO LEFT EYE TWICE DAILY    cyanocobalamin 1000 MCG/ML injection INJECT 1ML IN THE MUSCLE EVERY 2 WEEKS.    FeroSul 325 (65 Fe) MG tablet 1 tablet, Oral, 2 Times Daily, As needed    fludrocortisone 0.1 MG tablet TAKE 1 TO 2 TABLETS BY MOUTH DAILY WITH BREAKFAST. AVOID LYING DOWN FOR 2 HOURS AFTER TAKING THE MEDICATION TO PREVENT SUPINE HIGH BLOOD PRESSURE    HYDROcodone-acetaminophen (HYCET) 7.5-325 MG/15ML solution TAKE 15 ML BY MOUTH EVERY 6 TO 8 HOURS FOR 4 DAYS AS NEEDED    ibuprofen (ADVIL,MOTRIN) 800 mg, Oral, Every 6 Hours PRN    levothyroxine (SYNTHROID, LEVOTHROID) 137 mcg, Oral, Daily    vardenafil (LEVITRA) 20 MG tablet TAKE 1 TABLET BY MOUTH ONCE DAILY 60 MINUTES BEFORE SEXUAL ACTIVITY AS NEEDED        History of Present Illness  "  Patrick Shay is a 68 y.o. year old male here for follow up.  He continues to walk daily denies symptoms which limit his activities.  He denies recurrent neurovascular symptoms.  He follows up regularly with Dr. Barakat and Dr. Covarrubias.  He is off antihypertensives.  He takes fludrocortisone approximately once a month with orthostatic BP symptoms.  Home blood pressures are distantly under 130/80.  He maintains adequate hydration and changes positions slowly.  He reports labs performed by Dr. Tijerina approximately every 4 months prior to routine esophageal dilatation.  He is compliant with medications above which include DAPT and statin.  Denies any unusual bleeding/bruising.    Pt denies any chest pain, dyspnea, dyspnea on exertion, orthopnea, PND, palpitations, or claudication.      OBJECTIVE:  Vitals:    07/29/24 0913   BP: 142/78   BP Location: Right arm   Patient Position: Sitting   Cuff Size: Adult   Pulse: 65   SpO2: 99%   Weight: 93 kg (205 lb)   Height: 175.3 cm (69\")     Body mass index is 30.27 kg/m².    Vitals reviewed.   Constitutional:       Appearance: Normal appearance. Well-developed and not in distress.      Comments: Utilizes electrolarynx device    Pulmonary:      Effort: Pulmonary effort is normal.      Breath sounds: Normal breath sounds.   Cardiovascular:      PMI at left midclavicular line. Normal rate. Regular rhythm. Normal S1. Normal S2.       Murmurs: There is no murmur.   Edema:     Peripheral edema present.  Skin:     General: Skin is warm and dry.   Neurological:      General: No focal deficit present.      Mental Status: Alert.   Psychiatric:         Behavior: Behavior is cooperative.         Diagnostic Data:  Procedures    Advance Care Planning   ACP discussion was declined by the patient. Patient does not have an advance directive, declines further assistance.         ASSESSMENT:   Diagnosis Plan   1. Hyperlipidemia LDL goal <70        2. Labile hypertension        3. Carotid " stenosis, status post right carotid stent (4/23/2021)            PLAN:  History of CVA, carotid stenosis with R ICA stent -followed by Dr. Barakat and Dr. Covarrubias.  He denies recurrent neurovascular symptoms  -Continue DAPT and statin    Labile hypertension, currently off antihypertensives.  -140  -As needed Florinef for orthostatic changes    Hyperlipidemia, LDL goal <70. LDL 32 in 2022. Compliant with statin  -Continue atorvastatin      Follow up with Dr. Mejia in one year, or sooner as needed. Encouraged to contact office with any questions or concerns.    Yecenia Munson, NAS 07/29/24 10:10 EDT

## 2024-10-11 DIAGNOSIS — I65.21 CAROTID STENOSIS, ASYMPTOMATIC, RIGHT: Primary | ICD-10-CM

## 2024-10-16 ENCOUNTER — HOSPITAL ENCOUNTER (OUTPATIENT)
Dept: CARDIOLOGY | Facility: HOSPITAL | Age: 69
Discharge: HOME | End: 2024-10-16
Payer: MEDICARE

## 2024-10-16 ENCOUNTER — OFFICE VISIT (OUTPATIENT)
Dept: NEUROSURGERY | Facility: CLINIC | Age: 69
End: 2024-10-16
Payer: MEDICARE

## 2024-10-16 VITALS
HEART RATE: 93 BPM | DIASTOLIC BLOOD PRESSURE: 70 MMHG | WEIGHT: 206 LBS | SYSTOLIC BLOOD PRESSURE: 140 MMHG | HEIGHT: 69 IN | BODY MASS INDEX: 30.51 KG/M2 | TEMPERATURE: 99 F

## 2024-10-16 VITALS — WEIGHT: 205.03 LBS | BODY MASS INDEX: 30.37 KG/M2 | HEIGHT: 69 IN

## 2024-10-16 DIAGNOSIS — I65.21 CAROTID STENOSIS, ASYMPTOMATIC, RIGHT: Primary | ICD-10-CM

## 2024-10-16 DIAGNOSIS — I65.21 CAROTID STENOSIS, ASYMPTOMATIC, RIGHT: ICD-10-CM

## 2024-10-16 LAB
BH CV MID RIGHT ICA HIDDEN LRR: 1 CM
BH CV RIGHT CCA HIDDEN LRR: 1 CM/S
BH CV RIGHT MID CCA HIDDEN LRR: 1
BH CV VAS CAROTID RIGHT DISTAL STENT EDV: 94.4 CM/S
BH CV VAS CAROTID RIGHT DISTAL STENT PSV: 239 CM/S
BH CV VAS CAROTID RIGHT DISTAL TO STENT NATIVE VESSEL E: 90 CM/S
BH CV VAS CAROTID RIGHT DISTAL TO STENT PSV: 224 CM/S
BH CV VAS CAROTID RIGHT MID STENT EDV: 31.2 CM/S
BH CV VAS CAROTID RIGHT MID STENT PSV: 105 CM/S
BH CV VAS CAROTID RIGHT PROXIMAL STENT EDV: 105 CM/S
BH CV VAS CAROTID RIGHT PROXIMAL STENT PSV: 281 CM/S
BH CV VAS CAROTID RIGHT STENT NATIVE VESSEL PROXIMAL EDV: 74.4 CM/S
BH CV VAS CAROTID RIGHT STENT NATIVE VESSEL PROXIMAL PS: 159 CM/S
BH CV XLRA MEAS LEFT DIST CCA EDV: 131 CM/SEC
BH CV XLRA MEAS LEFT DIST CCA PSV: 474 CM/SEC
BH CV XLRA MEAS LEFT ICA/CCA DIASTOLIC RATIO: 0
BH CV XLRA MEAS LEFT ICA/CCA RATIO: 0
BH CV XLRA MEAS LEFT MID CCA EDV: 111 CM/SEC
BH CV XLRA MEAS LEFT MID CCA PSV: 421 CM/SEC
BH CV XLRA MEAS LEFT PROX CCA EDV: 40.9 CM/SEC
BH CV XLRA MEAS LEFT PROX CCA PSV: 117 CM/SEC
BH CV XLRA MEAS LEFT PROX ECA EDV: 17.2 CM/SEC
BH CV XLRA MEAS LEFT PROX ECA PSV: 53.6 CM/SEC
BH CV XLRA MEAS LEFT PROX SCLA PSV: 154 CM/SEC
BH CV XLRA MEAS LEFT VERTEBRAL A EDV: 33.5 CM/SEC
BH CV XLRA MEAS LEFT VERTEBRAL A PSV: 98.1 CM/SEC
BH CV XLRA MEAS RIGHT DIST CCA EDV: 74.5 CM/SEC
BH CV XLRA MEAS RIGHT DIST CCA PSV: 175 CM/SEC
BH CV XLRA MEAS RIGHT DIST ICA EDV: 107 CM/SEC
BH CV XLRA MEAS RIGHT DIST ICA PSV: 256 CM/SEC
BH CV XLRA MEAS RIGHT ICA/CCA RATIO: 0.97
BH CV XLRA MEAS RIGHT MID CCA EDV: 84.9 CM/SEC
BH CV XLRA MEAS RIGHT MID CCA PSV: 230 CM/SEC
BH CV XLRA MEAS RIGHT MID ICA EDV: 93.6 CM/SEC
BH CV XLRA MEAS RIGHT MID ICA PSV: 224 CM/SEC
BH CV XLRA MEAS RIGHT PROX CCA EDV: 57.2 CM/SEC
BH CV XLRA MEAS RIGHT PROX CCA PSV: 239 CM/SEC
BH CV XLRA MEAS RIGHT PROX ECA PSV: 662 CM/SEC
BH CV XLRA MEAS RIGHT PROX ICA EDV: 69.3 CM/SEC
BH CV XLRA MEAS RIGHT PROX ICA PSV: 213 CM/SEC
BH CV XLRA MEAS RIGHT PROX SCLA PSV: 215 CM/SEC
BH CV XLRA MEAS RIGHT VERTEBRAL A PSV: 60.2 CM/SEC
BH CVPROX RIGHT ICA HIDDEN LRR: 1 CM
LEFT ARM BP: NORMAL MMHG
RIGHT ARM BP: NORMAL MMHG

## 2024-10-16 PROCEDURE — 99213 OFFICE O/P EST LOW 20 MIN: CPT | Performed by: RADIOLOGY

## 2024-10-16 PROCEDURE — 3078F DIAST BP <80 MM HG: CPT | Performed by: RADIOLOGY

## 2024-10-16 PROCEDURE — 3077F SYST BP >= 140 MM HG: CPT | Performed by: RADIOLOGY

## 2024-10-16 PROCEDURE — 93880 EXTRACRANIAL BILAT STUDY: CPT

## 2024-10-16 PROCEDURE — 1159F MED LIST DOCD IN RCRD: CPT | Performed by: RADIOLOGY

## 2024-10-16 PROCEDURE — 1160F RVW MEDS BY RX/DR IN RCRD: CPT | Performed by: RADIOLOGY

## 2024-10-16 NOTE — PROGRESS NOTES
"NAME: SHAINA PICHARDO   DOS: 10/16/2024  : 1955  PCP: Ernie Hsieh MD    Chief Complaint:    Chief Complaint   Patient presents with    Follow-up     Carotid stenosis, asymptomatic, right       History of Present Illness:  69 y.o. male who is well-known to the neurointerventional service, having been previously treated on multiple occasions with right carotid angioplasty/stent placement for high-grade stenoses, with his most recent intervention being on 2022.  Mr. Pichardo has a history of left carotid occlusion, as well as prior surgery/radiation to the neck for laryngeal cancer, and thus was a \"high surgical risk\" for endarterectomy.  Mr. Pichardo also has significant vertebral artery disease, and collateral flow to his left cerebral hemisphere is somewhat tenuous.  He does have occasional right upper/lower extremity limb shaking TIAs, but these are typically associated with him standing up too quickly, and resolve within a few seconds, and are stable.  He denies any other stroke or TIA-like symptoms.  He is on a chronic dual antiplatelet regimen.  He presents today for routine follow-up.    Past Medical History:  Past Medical History:   Diagnosis Date    Carotid artery occlusion     Left carotid occlusion    Carotid stenosis, asymptomatic, right 2021    Status post right carotid stent    Disease of thyroid gland     Hyperlipidemia     Hypertension     Laryngeal cancer        Past Surgical History:  Past Surgical History:   Procedure Laterality Date    CAROTID STENT Bilateral 2021    Procedure: Carotid Stent;  Surgeon: Anjel Mccall MD;  Location:  [facility] CATH INVASIVE LOCATION;  Service: Interventional Radiology;  Laterality: Bilateral;    CAROTID STENT      2022 PER DR. MCCALL    CAROTID STENT Right 2022    Procedure: Carotid Stent;  Surgeon: Anjel Mccall MD;  Location:  [facility] CATH INVASIVE LOCATION;  Service: Interventional Radiology;  Laterality: Right;    " ESOPHAGEAL DILATATION      EVERY 3-4 MONTHS    INTERVENTIONAL RADIOLOGY PROCEDURE N/A 09/29/2020    Procedure: IR MECHANICAL THROMBECTOMY - PRIMARY;  Surgeon: Aidan Plasencia MD;  Location:  [facility] INVASIVE [facility]ATION;  Service: Interventional Radiology;  Laterality: N/A;    INTERVENTIONAL RADIOLOGY PROCEDURE Bilateral 04/14/2022    Procedure: Carotid Cerebral Angiogram;  Surgeon: Anjel Covarrubias MD;  Location:  [facility]ATH INVASIVE LOCATION;  Service: Interventional Radiology;  Laterality: Bilateral;    MANDIBLE SURGERY      MOLE REMOVAL  03/2023    skin cancer on back    TRACHEOSTOMY         Review of Systems:        Review of Systems   Constitutional:  Negative for activity change, appetite change, chills, diaphoresis, fatigue, fever and unexpected weight change.   HENT:  Negative for congestion, dental problem, drooling, ear discharge, ear pain, facial swelling, hearing loss, mouth sores, nosebleeds, postnasal drip, rhinorrhea, sinus pressure, sinus pain, sneezing, sore throat, tinnitus, trouble swallowing and voice change.    Eyes:  Negative for photophobia, pain, discharge, redness, itching and visual disturbance.   Respiratory:  Negative for apnea, cough, choking, chest tightness, shortness of breath, wheezing and stridor.    Cardiovascular:  Negative for chest pain, palpitations and leg swelling.   Gastrointestinal:  Negative for abdominal distention, abdominal pain, anal bleeding, blood in stool, constipation, diarrhea, nausea, rectal pain and vomiting.   Endocrine: Negative for cold intolerance, heat intolerance, polydipsia, polyphagia and polyuria.   Genitourinary:  Negative for decreased urine volume, difficulty urinating, dysuria, enuresis, flank pain, frequency, genital sores, hematuria and urgency.   Musculoskeletal:  Negative for arthralgias, back pain, gait problem, joint swelling, myalgias, neck pain and neck stiffness.   Skin:  Negative for color change, pallor, rash and wound.    Allergic/Immunologic: Negative for environmental allergies, food allergies and immunocompromised state.   Neurological:  Negative for dizziness, tremors, seizures, syncope, facial asymmetry, speech difficulty, weakness, light-headedness, numbness and headaches.   Hematological:  Negative for adenopathy. Does not bruise/bleed easily.   Psychiatric/Behavioral:  Negative for agitation, behavioral problems, confusion, decreased concentration, dysphoric mood, hallucinations, self-injury, sleep disturbance and suicidal ideas. The patient is not nervous/anxious and is not hyperactive.    All other systems reviewed and are negative.     Medications    Current Outpatient Medications:     Aspirin 81 MG capsule, Take 325 mg by mouth Daily., Disp: , Rfl:     atorvastatin (Lipitor) 40 MG tablet, Take 1 tablet by mouth Daily., Disp: 30 tablet, Rfl: 11    brinzolamide (AZOPT) 1 % ophthalmic suspension, SHAKE LIQUID AND INSTILL 1 DROP IN LEFT EYE TWICE DAILY, Disp: , Rfl:     buprenorphine-naloxone (SUBOXONE) 8-2 MG per SL tablet, Place 2 tablets under the tongue Daily., Disp: , Rfl:     clopidogrel (Plavix) 75 MG tablet, Take 1 tablet by mouth Daily., Disp: 30 tablet, Rfl: 11    Combigan 0.2-0.5 % ophthalmic solution, INSTILL 1 DROP INTO LEFT EYE TWICE DAILY, Disp: , Rfl:     cyanocobalamin 1000 MCG/ML injection, INJECT 1ML IN THE MUSCLE EVERY 2 WEEKS., Disp: , Rfl:     FeroSul 325 (65 Fe) MG tablet, Take 1 tablet by mouth 2 (Two) Times a Day. As needed, Disp: , Rfl:     fludrocortisone 0.1 MG tablet, TAKE 1 TO 2 TABLETS BY MOUTH DAILY WITH BREAKFAST. AVOID LYING DOWN FOR 2 HOURS AFTER TAKING THE MEDICATION TO PREVENT SUPINE HIGH BLOOD PRESSURE, Disp: 60 tablet, Rfl: 3    HYDROcodone-acetaminophen (HYCET) 7.5-325 MG/15ML solution, TAKE 15 ML BY MOUTH EVERY 6 TO 8 HOURS FOR 4 DAYS AS NEEDED, Disp: , Rfl:     ibuprofen (ADVIL,MOTRIN) 800 MG tablet, Take 1 tablet by mouth Every 6 (Six) Hours As Needed., Disp: , Rfl:      levothyroxine (SYNTHROID, LEVOTHROID) 137 MCG tablet, Take 1 tablet by mouth Daily., Disp: , Rfl:     vardenafil (LEVITRA) 20 MG tablet, TAKE 1 TABLET BY MOUTH ONCE DAILY 60 MINUTES BEFORE SEXUAL ACTIVITY AS NEEDED, Disp: , Rfl:     Allergies:  No Known Allergies    Social Hx:  Social History     Tobacco Use    Smoking status: Former     Current packs/day: 0.00     Average packs/day: 2.0 packs/day for 30.0 years (60.0 ttl pk-yrs)     Types: Cigarettes     Start date:      Quit date:      Years since quittin.8     Passive exposure: Past    Smokeless tobacco: Never   Vaping Use    Vaping status: Never Used   Substance Use Topics    Alcohol use: Not Currently    Drug use: Never       Family Hx:  Family History   Problem Relation Age of Onset    Atrial fibrillation Mother     Cancer Father     Cancer Maternal Grandmother     Throat cancer Maternal Grandmother     Cancer Maternal Grandfather     Heart attack Paternal Grandmother     No Known Problems Paternal Grandfather     No Known Problems Other     Ataxia Neg Hx     Chorea Neg Hx     Dementia Neg Hx     Mental retardation Neg Hx     Migraines Neg Hx     Multiple sclerosis Neg Hx     Neurofibromatosis Neg Hx     Neuropathy Neg Hx     Parkinsonism Neg Hx     Seizures Neg Hx     Stroke Neg Hx        Review of Imaging:  Carotid duplex dated 10/16/2024 from [facility] was reviewed along with its corresponding radiologic report.  Comparison is made to prior study studies, the most recent being on 10/16/2023.  The right common carotid/internal carotid artery stent construct remains widely patent, without recurrent hemodynamically significant stenosis.  Peak velocities within the proximal right common carotid artery are 239/57 cm/s (was 324/120 cm/s).  Chronic occlusion of the left internal carotid artery is again noted.  There is antegrade flow in the vertebral arteries.    Physical Examination:  Vitals:    10/16/24 1300   BP: 140/70   Pulse: 93    Temp: 99 °F (37.2 °C)        General Appearance:   Well developed, well nourished, well groomed, alert, and cooperative.  Cardiovascular: Regular rate and rhythm.     Neurological examination:  Alert and oriented x 3.  He is status post total laryngectomy, but communicates very well with a vocal assist device.  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  I do not appreciate any gross visual field deficits in the right eye.  Strength is symmetric in the upper and lower extremities.  He ambulates unassisted.    Diagnoses/Plan:    Mr. Shay is a 69 y.o. male status post angioplasty/stent placement for right carotid stenosis, with his most recent intervention being on 9/21/2022.  He has chronic occlusion of the left internal carotid artery, and somewhat tenuous collateral flow to the left cerebral hemisphere.  He does have occasional limb shaking TIAs involving the right upper and lower extremities, but these are stable and last only a few seconds.  Otherwise, he denies any stroke or TIA-like symptoms.  Carotid duplex on 10/16/2024 demonstrates widely patent right carotid stent construct, without recurrent hemodynamically significant stenosis.  He remains on a chronic dual antiplatelet regimen, and is tolerating this quite well.  I plan on seeing him back in 12 months time with carotid duplex, to ensure stability and exclude any progression of disease that might necessitate further treatment/intervention.  During the interim, he will contact our office in/or call 911 if his limb shaking TIA symptoms progress/worsen, or he develops any new neurologic deficit.      Copied text and portions of the note have been reviewed and are accurate as of 10/16/24.

## 2024-10-17 ENCOUNTER — TELEPHONE (OUTPATIENT)
Dept: NEUROLOGY | Facility: CLINIC | Age: 69
End: 2024-10-17
Payer: MEDICARE

## 2024-10-17 NOTE — TELEPHONE ENCOUNTER
Caller: Abeba Ayers    Relationship: Emergency Contact    Best call back number: [phone]    Caller requesting test results: ABEBA    What test was performed: CAROTID ULTRASOUND    When was the test performed: 10/16/24    Where was the test performed:  [facility]

## 2025-02-19 RX ORDER — CLOPIDOGREL BISULFATE 75 MG/1
75 TABLET ORAL DAILY
Qty: 30 TABLET | Refills: 11 | Status: SHIPPED | OUTPATIENT
Start: 2025-02-19 | End: 2026-02-19

## 2025-02-19 RX ORDER — ATORVASTATIN CALCIUM 40 MG/1
40 TABLET, FILM COATED ORAL DAILY
Qty: 30 TABLET | Refills: 11 | Status: SHIPPED | OUTPATIENT
Start: 2025-02-19 | End: 2026-02-19

## 2025-02-19 NOTE — TELEPHONE ENCOUNTER
LAST SEEN  02.19.2024    Caller: JOHN     Relationship: SIG OTHER    Best call back number: [phone]     Requested Prescriptions:   LIPITOR 40 MG   PLAVIX 75 MG     Requested Prescriptions      No prescriptions requested or ordered in this encounter        Pharmacy where request should be sent:  [id number] [address] [facility] [address] [place] [address] - [phone]  [phone]     Last office visit with prescribing clinician: 2/19/2024   Last telemedicine visit with prescribing clinician: Visit date not found   Next office visit with prescribing clinician: 3/27/2025     Additional details provided by patient:       Does the patient have less than a 3 day supply:  [] Yes  [] No    Would you like a call back once the refill request has been completed: [] Yes [] No    If the office needs to give you a call back, can they leave a voicemail: [] Yes [] No    Audrey Cox Rep   02/19/25 08:59 EST

## 2025-02-19 NOTE — TELEPHONE ENCOUNTER
Rx Refill Note  Requested Prescriptions     Pending Prescriptions Disp Refills    clopidogrel (Plavix) 75 MG tablet 30 tablet 11     Sig: Take 1 tablet by mouth Daily.    atorvastatin (Lipitor) 40 MG tablet 30 tablet 11     Sig: Take 1 tablet by mouth Daily.      Last filled:Plavix and atorvastatin 02/19/2024  Last office visit with prescribing clinician: 2/19/2024      Next office visit with prescribing clinician: 3/27/2025     Fabiola Greene MA  02/19/25, 11:57 EST

## 2025-03-27 ENCOUNTER — OFFICE VISIT (OUTPATIENT)
Dept: NEUROLOGY | Facility: CLINIC | Age: 70
End: 2025-03-27
Payer: MEDICARE

## 2025-03-27 ENCOUNTER — LAB (OUTPATIENT)
Dept: LAB | Facility: HOSPITAL | Age: 70
End: 2025-03-27
Payer: MEDICARE

## 2025-03-27 VITALS
WEIGHT: 198 LBS | SYSTOLIC BLOOD PRESSURE: 150 MMHG | HEIGHT: 69 IN | BODY MASS INDEX: 29.33 KG/M2 | OXYGEN SATURATION: 98 % | HEART RATE: 97 BPM | DIASTOLIC BLOOD PRESSURE: 78 MMHG

## 2025-03-27 DIAGNOSIS — R42 DIZZINESS: ICD-10-CM

## 2025-03-27 DIAGNOSIS — I69.30 SEQUELAE, POST-STROKE: ICD-10-CM

## 2025-03-27 DIAGNOSIS — R73.03 PREDIABETES: ICD-10-CM

## 2025-03-27 DIAGNOSIS — I69.30 SEQUELAE, POST-STROKE: Primary | ICD-10-CM

## 2025-03-27 DIAGNOSIS — E03.9 HYPOTHYROIDISM, UNSPECIFIED TYPE: ICD-10-CM

## 2025-03-27 LAB
CHOLEST SERPL-MCNC: 93 MG/DL (ref 0–200)
HDLC SERPL-MCNC: 39 MG/DL (ref 40–60)
LDLC SERPL CALC-MCNC: 37 MG/DL (ref 0–100)
LDLC/HDLC SERPL: 0.94 {RATIO}
T4 FREE SERPL-MCNC: 1.53 NG/DL (ref 0.92–1.68)
TRIGL SERPL-MCNC: 86 MG/DL (ref 0–150)
TSH SERPL DL<=0.05 MIU/L-ACNC: 0.29 UIU/ML (ref 0.27–4.2)
VLDLC SERPL-MCNC: 17 MG/DL (ref 5–40)

## 2025-03-27 PROCEDURE — 3078F DIAST BP <80 MM HG: CPT | Performed by: PSYCHIATRY & NEUROLOGY

## 2025-03-27 PROCEDURE — 1159F MED LIST DOCD IN RCRD: CPT | Performed by: PSYCHIATRY & NEUROLOGY

## 2025-03-27 PROCEDURE — 3077F SYST BP >= 140 MM HG: CPT | Performed by: PSYCHIATRY & NEUROLOGY

## 2025-03-27 PROCEDURE — 36415 COLL VENOUS BLD VENIPUNCTURE: CPT

## 2025-03-27 PROCEDURE — 80061 LIPID PANEL: CPT

## 2025-03-27 PROCEDURE — 84443 ASSAY THYROID STIM HORMONE: CPT

## 2025-03-27 PROCEDURE — 99213 OFFICE O/P EST LOW 20 MIN: CPT | Performed by: PSYCHIATRY & NEUROLOGY

## 2025-03-27 PROCEDURE — 1160F RVW MEDS BY RX/DR IN RCRD: CPT | Performed by: PSYCHIATRY & NEUROLOGY

## 2025-03-27 PROCEDURE — 82607 VITAMIN B-12: CPT

## 2025-03-27 PROCEDURE — 84439 ASSAY OF FREE THYROXINE: CPT

## 2025-03-27 PROCEDURE — 83036 HEMOGLOBIN GLYCOSYLATED A1C: CPT

## 2025-03-27 NOTE — PROGRESS NOTES
"Subjective:    CC: Patrick Shay is seen today  for stroke      Current visit-patient had a repeat carotid Doppler in October that showed minimal stenosis of the right ICA with patent stent with continued left ICA occlusion.  He continues to take both aspirin 325 mg in addition to Plavix and Lipitor 40 mg daily.  Has not had his blood work checked in about 2 years.  His dizziness remains well-controlled and he has not had to take any fludrocortisone since he last saw me.  Does have occasional lightheadedness when he stands up too quickly but he makes postural modifications.  Also keeps himself well-hydrated.  Walks for several miles a day.      Last visit-patient denies any new strokelike symptoms since his last visit.  He continues to take aspirin 325 mg daily along with Plavix and Lipitor 40 mg daily.  He had a repeat carotid Doppler in October of last year which showed restenosis of about 50 to 70% in the right ICA (with stents present in the right ICA and CCA) along with left ICA occlusion.  He saw neurosurgery and they will just be repeating her carotid Doppler in 1 year.  With regards to his lightheadedness he continues to take fludrocortisone 0.1 mg as needed.  Mainly has it while standing up.  Is keeping himself well-hydrated.    Last year-Patient has not had any new strokelike symptoms.  After his last visit I had spoken to the vascular neurologist who had started him on Eliquis and we both agreed to take him off of it and keep him on dual antiplatelets.  In September he had a carotid angiography showed progression of disease with now \"high-grade\" stenosis at the proximal margin of his right carotid stent construct, within the mid cervical portions of the right common carotid artery, corresponding to a greater than 70% lesion.    After that he underwent angioplasty with stent placement with good flow.  He has been on aspirin 81 mg and Plavix 75 mg in addition to Lipitor 40 mg.  His last Plavix response " test was elevated at 248.  His hemoglobin was 8.7, last lipid panel was as follows-TC 89, TG 54, LDL 32, HDL 44.  With regards to his postural dizziness he has not taken fludrocortisone recently.  However he states that whenever his blood pressure drops down he feels sick.    Last visit-patient denies having any strokelike symptoms.  He had a repeat carotid Doppler in March that still showed elevated velocities in the right ICA with occlusion in the left ICA.  After that he underwent a conventional diagnostic angiogram by Dr. Kaplan which showed minimal right CCA/ICA stenosis.  He told him to continue both aspirin and Plavix along with Lipitor 40 mg.  Patient also saw Dr. Mejia from cardiology who ordered a 30-day Holter monitor for him that did not show any atrial fibrillation.  He too continue with him on the same medications.  His dizziness remains well controlled and he has only had to take fludrocortisone once when he bent over and had a spell.  Of note-I reviewed Dr. Kaplan and cardiology notes    Last visit-patient had a repeat carotid Doppler in December that showed continued left ICA occlusion and a patent stent in the right ICA with increased velocities in the right common and internal carotid artery with ICA/CCA ratio 1.3.  After that he saw Dr. Kaplan who felt that the increased velocities could be due to to compensation from his left ICA occlusion.  He will be repeating his carotid Doppler in 3 months to see if they have further worsened.  Patient denies having any new strokelike symptoms.  He continues to take aspirin 81 mg along with Eliquis and Lipitor 40 mg daily.  He also denies having any significant episodes of dizziness/lightheadedness or syncope.  He brought his blood pressure readings from home and most days his systolic blood pressure is elevated (over 150).  He takes fludrocortisone 0.1 mg only as needed for lightheadedness or if his blood pressure is too low.  Of note-I reviewed Dr. Kaplan  note.    Last visit-patient's wife had called me a few weeks ago stating that patient was having spells with dizziness and lightheadedness on walking where he was turning pale and his blood pressure was dropping to the 90s/60s.  After that I had started him on fludrocortisone 0.1 mg daily 1-2 times a day which has really helped with his symptoms.  He denies having any syncopal episodes.  Also denies having any new strokelike symptoms.  Continues to take aspirin, Eliquis and Lipitor 80 mg.  His next appointment with Dr. Kaplan is in December.    Last visit-patient denies having any strokelike symptoms since his last visit. He had right carotid  angioplasty /stent placement in April and was placed on Plavix in addition to his Eliquis. He has been feeling less dizzy since the procedure. His blood pressure was also dropping down to low hence he stopped taking his amlodipine however some home readings are in the 150s. He also continues to take Lipitor 80 mg daily. He had his Holter monitor which showed a 24 beat run of SVT as well as short episodes of atrial tachycardia but no A. fib.    Last visit-since the last visit patient denies having any new strokelike symptoms.  He continues to take aspirin along with Eliquis and Lipitor 80 mg.  He had his carotid Doppler which showed right ICA stenosis of over 70% and severe proximal left CCA stenosis with total occlusion of left ICA.  He also had a CT angiogram of the head and neck that continues to show 50% right ICA stenosis as well as occluded left CCA and proximal left ICA along its entire cervical segment, with reconstitution of the petrous segment.  The previously seen distal left MCA stenosis was not noted at this time.  He also had a Holter monitor the results of which are still pending.  His blood pressure is much better after he cut back on his amlodipine.  Most readings were between 1 10-1 30.    Initial [id number]-old male accompanied by his girlfriend with a  history of hypertension, hyperlipidemia, laryngeal cancer status post tracheostomy in 1998 (uses a voice assist device), legally blind in left eye due to eye injury presents as a hospital follow-up for stroke.  Patient states that he had difficulty getting his words out on 9/29.  He was brought to the hospital where an initial CT scan of the head edema in the left frontal region. After that he had a CT angiogram of the head which showed 50 to 60% of the left cavernous carotid stenosis and superior MCA division occlusion and a CT angiogram of the neck that showed near occlusion of the mid left common carotid artery as well as possible occlusion of the left ICA from the level of the mid left ICA to the proximal petrous segment.  Also 50 to 75% stenosis of the right carotid bulb and narrowing of right ICA to between 50 to 60% noted as well as moderate right mid vertebral stenosis.  CT perfusion showed focal ischemia in the left frontotemporal region.  MRI brain showed an area of restricted diffusion in the left frontotemporal region including the left basal ganglia and insular cortex.  There was some question of whether the patient was taking Xarelto however patient denies that and states that his mother was on Xarelto not him.  He does not have a known history of atrial fibrillation.  Was started on Eliquis 5 mg twice a day (for presumable cardio embolic source) and aspirin 81 mg daily in addition to Lipitor 80 mg daily.  His lipid panel was as follows-, TG 96, LDL 65, HDL 37.  A1c was 5.6.  He states that his speech problems have now subsided.  Of note-I personally reviewed his CT angiogram of the head and neck and his MRI brain as well as 's note    The following portions of the patient's history were reviewed today and updated as of 12/15/2020  : allergies, current medications, past family history, past medical history, past social history, past surgical history and problem list  These document  will be scanned to patient's chart.      Current Outpatient Medications:     Aspirin 81 MG capsule, Take 325 mg by mouth Daily., Disp: , Rfl:     atorvastatin (Lipitor) 40 MG tablet, Take 1 tablet by mouth Daily., Disp: 30 tablet, Rfl: 11    buprenorphine-naloxone (SUBOXONE) 8-2 MG per SL tablet, Place 2 tablets under the tongue Daily., Disp: , Rfl:     clopidogrel (Plavix) 75 MG tablet, Take 1 tablet by mouth Daily., Disp: 30 tablet, Rfl: 11    cyanocobalamin 1000 MCG/ML injection, INJECT 1ML IN THE MUSCLE EVERY 2 WEEKS., Disp: , Rfl:     FeroSul 325 (65 Fe) MG tablet, Take 1 tablet by mouth 2 (Two) Times a Day. As needed, Disp: , Rfl:     fludrocortisone 0.1 MG tablet, TAKE 1 TO 2 TABLETS BY MOUTH DAILY WITH BREAKFAST. AVOID LYING DOWN FOR 2 HOURS AFTER TAKING THE MEDICATION TO PREVENT SUPINE HIGH BLOOD PRESSURE, Disp: 60 tablet, Rfl: 3    HYDROcodone-acetaminophen (HYCET) 7.5-325 MG/15ML solution, TAKE 15 ML BY MOUTH EVERY 6 TO 8 HOURS FOR 4 DAYS AS NEEDED, Disp: , Rfl:     ibuprofen (ADVIL,MOTRIN) 800 MG tablet, Take 1 tablet by mouth Every 6 (Six) Hours As Needed., Disp: , Rfl:     levothyroxine (SYNTHROID, LEVOTHROID) 137 MCG tablet, Take 1 tablet by mouth Daily., Disp: , Rfl:     vardenafil (LEVITRA) 20 MG tablet, TAKE 1 TABLET BY MOUTH ONCE DAILY 60 MINUTES BEFORE SEXUAL ACTIVITY AS NEEDED, Disp: , Rfl:     brinzolamide (AZOPT) 1 % ophthalmic suspension, SHAKE LIQUID AND INSTILL 1 DROP IN LEFT EYE TWICE DAILY (Patient not taking: Reported on 3/27/2025), Disp: , Rfl:     Combigan 0.2-0.5 % ophthalmic solution, INSTILL 1 DROP INTO LEFT EYE TWICE DAILY (Patient not taking: Reported on 3/27/2025), Disp: , Rfl:    Past Medical History:   Diagnosis Date    Carotid artery occlusion     Left carotid occlusion    Carotid stenosis, asymptomatic, right 04/23/2021    Status post right carotid stent    Disease of thyroid gland     Hyperlipidemia     Hypertension     Laryngeal cancer       Past Surgical History:    Procedure Laterality Date    CAROTID STENT Bilateral 2021    Procedure: Carotid Stent;  Surgeon: Anjel Covarrubias MD;  Location:  [facility];  Service: Interventional Radiology;  Laterality: Bilateral;    CAROTID STENT      2022 PER DR. COVARRUBIAS    CAROTID STENT Right 2022    Procedure: Carotid Stent;  Surgeon: Anjel Covarrubias MD;  Location:  [facility];  Service: Interventional Radiology;  Laterality: Right;    ESOPHAGEAL DILATATION      EVERY 3-4 MONTHS    INTERVENTIONAL RADIOLOGY PROCEDURE N/A 2020    Procedure: IR MECHANICAL THROMBECTOMY - PRIMARY;  Surgeon: Aidan Plasencia MD;  Location:  [facility];  Service: Interventional Radiology;  Laterality: N/A;    INTERVENTIONAL RADIOLOGY PROCEDURE Bilateral 2022    Procedure: Carotid Cerebral Angiogram;  Surgeon: Anjel Covarrubias MD;  Location:  [facility];  Service: Interventional Radiology;  Laterality: Bilateral;    MANDIBLE SURGERY      MOLE REMOVAL  2023    skin cancer on back    TRACHEOSTOMY        Family History   Problem Relation Age of Onset    Atrial fibrillation Mother     Cancer Father     Cancer Maternal Grandmother     Throat cancer Maternal Grandmother     Cancer Maternal Grandfather     Heart attack Paternal Grandmother     No Known Problems Paternal Grandfather     No Known Problems Other     Ataxia Neg Hx     Chorea Neg Hx     Dementia Neg Hx     Mental retardation Neg Hx     Migraines Neg Hx     Multiple sclerosis Neg Hx     Neurofibromatosis Neg Hx     Neuropathy Neg Hx     Parkinsonism Neg Hx     Seizures Neg Hx     Stroke Neg Hx       Social History     Socioeconomic History    Marital status:    Tobacco Use    Smoking status: Former     Current packs/day: 0.00     Average packs/day: 2.0 packs/day for 30.0 years (60.0 ttl pk-yrs)     Types: Cigarettes     Start date:      Quit date:      Years since quittin.2      "Passive exposure: Past    Smokeless tobacco: Never   Vaping Use    Vaping status: Never Used   Substance and Sexual Activity    Alcohol use: Not Currently    Drug use: Never    Sexual activity: Yes     Partners: Female     Review of Systems   Neurological:  Positive for speech difficulty.   All other systems reviewed and are negative.      Objective:    /78   Pulse 97   Ht 175.3 cm (69\")   Wt 89.8 kg (198 lb)   SpO2 98%   BMI 29.24 kg/m²     Neurology Exam:    General apperance: NAD.     Mental status: Alert, awake and oriented to time place and person.    Recent and Remote memory: Intact.    Attention span and Concentration: Normal.     Language and Speech: Intact- moderate dysarthria.  Using a voice assist device to speak    Fluency, Naming , Repitition and Comprehension:  Intact    Cranial Nerves:   CN II: Visual fields Intact in right eye.  He is legally blind in left eye.  Pupils - CHANTEL  CN III, IV and VI: Extraocular movements are intact in right eye.  He could not AB duct his left eye.    CN V: Facial sensation is intact.   CN VII: Muscles of facial expression reveal no asymmetry. Intact.   CN VIII: Hearing is intact. Whispered voice intact.   CN IX and X: Palate elevates symmetrically. Intact  CN XI: Shoulder shrug is intact.   CN XII: Tongue is midline without evidence of atrophy or fasciculation.     Ophthalmoscopic exam of optic disc-poorly visualized    Motor:  Right UE muscle strength 5/5. Normal tone.     Left UE muscle strength 5/5. Normal tone.      Right LE muscle strength5/5. Normal tone.     Left LE muscle strength 5/5. Normal tone.      Sensory: Normal light touch, vibration and pinprick sensation bilaterally.    DTRs: 2+ bilaterally in upper and lower extremities.    Babinski: Negative bilaterally.    Co-ordination: Normal finger-to-nose, heel to shin B/L.    Rhomberg: Negative.    Gait: Normal.    Cardiovascular: Regular rate and rhythm without murmur, gallop or rub.    Assessment " and Plan:  1. Sequelae, post-stroke  Patient had a left MCA stroke which could have been due to artery to artery embolus secondary to left ICA occlusion (acute versus chronic) with emboli to the superior branch of left MCA versus a cardioembolic phenomenon (less likely)  His initial Holter showed SVT and atrial tachycardia.  Most recent Holter monitor did not show any atrial fibrillation  Patient has now had 2 angioplasties with stent placement in the right ICA.  Repeat carotid Doppler showed minimal restenosis of the right ICA stent year.   He should continue aspirin 325 mg daily, Plavix 75 mg daily and Lipitor 40 mg LDL of less than 100.  His LDL was 32.  Last Plavix response test was elevated  -Goal blood pressure less than 140/90.   I will recheck a lipid panel and A1c level    2.  Dizziness  Most likely due to orthostatic hypotension  He should continue taking fludrocortisone 0.1 mg anytime he is dizzy or his blood pressure drops below 120/80 (since he becomes symptomatic even with slight drops in blood pressure possibly due to ICA occlusion)  I have also counseled him to keep himself well-hydrated which he does    3.  Hypothyroidism  I will check a thyroid function test as it has not been checked for 2 years    Return in about 1 year (around 3/27/2026).       Tyra Barakat MD

## 2025-03-28 ENCOUNTER — RESULTS FOLLOW-UP (OUTPATIENT)
Dept: NEUROLOGY | Facility: CLINIC | Age: 70
End: 2025-03-28
Payer: MEDICARE

## 2025-03-28 LAB
HBA1C MFR BLD: 5.5 % (ref 4.8–5.6)
VIT B12 BLD-MCNC: 820 PG/ML (ref 211–946)

## 2025-03-28 NOTE — TELEPHONE ENCOUNTER
Relayed to Abeba, who answered the phone that his labs for his thyroid and cholesterol looked good.

## 2025-06-20 ENCOUNTER — TELEPHONE (OUTPATIENT)
Dept: NEUROSURGERY | Facility: CLINIC | Age: 70
End: 2025-06-20
Payer: MEDICARE

## 2025-06-20 NOTE — TELEPHONE ENCOUNTER
Provider:  Given  Surgery/Procedure:  right carotid stent  Surgery/Procedure Date:  4/23/21  Last visit:   10/16/24  Next visit: 9/24/25     Reason for call:  Spoke to patient's daughter. She reports that Mr. Shay has had dizzy spells for some time and this is known, however they have become more frequent and severe recently. She reports the dizzy spells used to come on with changes in position, from sitting/laying to standing, but are coming on more sporadically now without reason. Denies any vision change or numbness. Denies any confusion. Does have some weakness in his legs with the dizzy spells.

## 2025-06-24 NOTE — TELEPHONE ENCOUNTER
I have talked with the patients daughter Abeba and provided an earlier appt date and time.  7/23/25 she will call with any further problems

## 2025-07-23 ENCOUNTER — OFFICE VISIT (OUTPATIENT)
Dept: NEUROSURGERY | Facility: CLINIC | Age: 70
End: 2025-07-23
Payer: MEDICARE

## 2025-07-23 ENCOUNTER — HOSPITAL ENCOUNTER (OUTPATIENT)
Dept: CARDIOLOGY | Facility: HOSPITAL | Age: 70
Discharge: HOME | End: 2025-07-23
Admitting: RADIOLOGY
Payer: MEDICARE

## 2025-07-23 VITALS
DIASTOLIC BLOOD PRESSURE: 60 MMHG | HEART RATE: 93 BPM | SYSTOLIC BLOOD PRESSURE: 130 MMHG | BODY MASS INDEX: 28.24 KG/M2 | HEIGHT: 69 IN | OXYGEN SATURATION: 97 % | TEMPERATURE: 98.2 F | WEIGHT: 190.7 LBS

## 2025-07-23 VITALS — SYSTOLIC BLOOD PRESSURE: 147 MMHG | DIASTOLIC BLOOD PRESSURE: 69 MMHG

## 2025-07-23 DIAGNOSIS — I65.21 CAROTID STENOSIS, ASYMPTOMATIC, RIGHT: ICD-10-CM

## 2025-07-23 DIAGNOSIS — I65.21 CAROTID STENOSIS, ASYMPTOMATIC, RIGHT: Primary | ICD-10-CM

## 2025-07-23 PROCEDURE — 93880 EXTRACRANIAL BILAT STUDY: CPT

## 2025-07-23 NOTE — PROGRESS NOTES
"  Name: Patrick Shay    : 1955     MRN: [id number]     Primary Care Provider: Ernie Hsieh MD    Chief Complaint  Follow-up (Carotid stenosis, asymptomatic, right)      History of Present Illness:  Patrick Shay is a 69 y.o. male who is well-known to the neurointerventional service, having been previously treated on multiple occasions with right carotid angioplasty/stent placement for high-grade stenoses, with his most recent intervention being on 2022.  Mr. Shay has a history of left carotid occlusion, as well as prior surgery/radiation to the neck for laryngeal cancer, and thus was a \"high surgical risk\" for endarterectomy.  Mr. Shay also has significant vertebral artery disease, and collateral flow to his left cerebral hemisphere is somewhat tenuous.  He does have occasional right upper/lower extremity limb shaking TIAs, and reports that these have continuously worsened and are now occurring at least daily.  Mr. Shay and family are concerned about the increase in occurrence/worsening of these \"episodes\".  He denies any syncope or falls. He denies any other stroke or TIA-like symptoms.  He is on a chronic dual antiplatelet regimen.  He presents today for routine follow-up.     PMHX  Allergies:  No Known Allergies  Medications    Current Outpatient Medications:     Aspirin 81 MG capsule, Take 325 mg by mouth Daily., Disp: , Rfl:     atorvastatin (Lipitor) 40 MG tablet, Take 1 tablet by mouth Daily., Disp: 30 tablet, Rfl: 11    brinzolamide (AZOPT) 1 % ophthalmic suspension, , Disp: , Rfl:     buprenorphine-naloxone (SUBOXONE) 8-2 MG per SL tablet, Place 2 tablets under the tongue Daily., Disp: , Rfl:     clopidogrel (Plavix) 75 MG tablet, Take 1 tablet by mouth Daily., Disp: 30 tablet, Rfl: 11    cyanocobalamin 1000 MCG/ML injection, INJECT 1ML IN THE MUSCLE EVERY 2 WEEKS., Disp: , Rfl:     FeroSul 325 (65 Fe) MG tablet, Take 1 tablet by mouth 2 (Two) Times a Day. As needed, " Disp: , Rfl:     fludrocortisone 0.1 MG tablet, TAKE 1 TO 2 TABLETS BY MOUTH DAILY WITH BREAKFAST. AVOID LYING DOWN FOR 2 HOURS AFTER TAKING THE MEDICATION TO PREVENT SUPINE HIGH BLOOD PRESSURE, Disp: 60 tablet, Rfl: 3    HYDROcodone-acetaminophen (HYCET) 7.5-325 MG/15ML solution, TAKE 15 ML BY MOUTH EVERY 6 TO 8 HOURS FOR 4 DAYS AS NEEDED, Disp: , Rfl:     ibuprofen (ADVIL,MOTRIN) 800 MG tablet, Take 1 tablet by mouth Every 6 (Six) Hours As Needed., Disp: , Rfl:     levothyroxine (SYNTHROID, LEVOTHROID) 137 MCG tablet, Take 1 tablet by mouth Daily., Disp: , Rfl:     vardenafil (LEVITRA) 20 MG tablet, TAKE 1 TABLET BY MOUTH ONCE DAILY 60 MINUTES BEFORE SEXUAL ACTIVITY AS NEEDED, Disp: , Rfl:     Combigan 0.2-0.5 % ophthalmic solution, INSTILL 1 DROP INTO LEFT EYE TWICE DAILY (Patient not taking: Reported on 7/23/2025), Disp: , Rfl:   Past Medical History:  Past Medical History:   Diagnosis Date    Carotid artery occlusion     Left carotid occlusion    Carotid stenosis, asymptomatic, right 04/23/2021    Status post right carotid stent    Disease of thyroid gland     Hyperlipidemia     Hypertension     Laryngeal cancer      Past Surgical History:  Past Surgical History:   Procedure Laterality Date    CAROTID STENT Bilateral 04/23/2021    Procedure: Carotid Stent;  Surgeon: Anjel Covarrubias MD;  Location:  [facility];  Service: Interventional Radiology;  Laterality: Bilateral;    CAROTID STENT      09/21/2022 PER DR. COVARRUBIAS    CAROTID STENT Right 09/21/2022    Procedure: Carotid Stent;  Surgeon: Anjel Covarrubias MD;  Location:  [facility] INVASIVE LOCATION;  Service: Interventional Radiology;  Laterality: Right;    ESOPHAGEAL DILATATION      EVERY 3-4 MONTHS    INTERVENTIONAL RADIOLOGY PROCEDURE N/A 09/29/2020    Procedure: IR MECHANICAL THROMBECTOMY - PRIMARY;  Surgeon: Aidan Plasencia MD;  Location:  [facility] CATH INVASIVE LOCATION;  Service: Interventional Radiology;  Laterality: N/A;     INTERVENTIONAL RADIOLOGY PROCEDURE Bilateral 2022    Procedure: Carotid Cerebral Angiogram;  Surgeon: Anjel Covarrubias MD;  Location: [facility] INVASIVE LOC[facility];  Service: Interventional Radiology;  Laterality: Bilateral;    MANDIBLE SURGERY      MOLE REMOVAL  2023    skin cancer on back    TRACHEOSTOMY       Social Hx:  Social History     Tobacco Use    Smoking status: Former     Current packs/day: 0.00     Average packs/day: 2.0 packs/day for 30.0 years (60.0 ttl pk-yrs)     Types: Cigarettes     Start date:      Quit date:      Years since quittin.5     Passive exposure: Past    Smokeless tobacco: Never   Vaping Use    Vaping status: Never Used   Substance Use Topics    Alcohol use: Not Currently    Drug use: Never     Family Hx:  Family History   Problem Relation Age of Onset    Atrial fibrillation Mother     Cancer Father     Cancer Maternal Grandmother     Throat cancer Maternal Grandmother     Cancer Maternal Grandfather     Heart attack Paternal Grandmother     No Known Problems Paternal Grandfather     No Known Problems Other     Ataxia Neg Hx     [place] Neg Hx     Dementia Neg Hx     Mental retardation Neg Hx     Migraines Neg Hx     Multiple sclerosis Neg Hx     Neurofibromatosis Neg Hx     Neuropathy Neg Hx     Parkinsonism Neg Hx     Seizures Neg Hx     Stroke Neg Hx      Review of Systems:        Review of Systems   Constitutional:  Negative for activity change, appetite change, chills, diaphoresis, fatigue, fever and unexpected weight change.   HENT:  Negative for congestion, dental problem, drooling, ear discharge, ear pain, facial swelling, hearing loss, mouth sores, nosebleeds, postnasal drip, rhinorrhea, sinus pressure, sinus pain, sneezing, sore throat, tinnitus, trouble swallowing and voice change.    Eyes:  Negative for photophobia, pain, discharge, redness, itching and visual disturbance.   Respiratory:  Negative for apnea, cough, choking, chest tightness, shortness of  breath, wheezing and stridor.    Cardiovascular:  Negative for chest pain, palpitations and leg swelling.   Gastrointestinal:  Negative for abdominal distention, abdominal pain, anal bleeding, blood in stool, constipation, diarrhea, nausea, rectal pain and vomiting.   Endocrine: Negative for cold intolerance, heat intolerance, polydipsia, polyphagia and polyuria.   Genitourinary:  Negative for decreased urine volume, difficulty urinating, dysuria, enuresis, flank pain, frequency, genital sores, hematuria and urgency.   Musculoskeletal:  Negative for arthralgias, back pain, gait problem, joint swelling, myalgias, neck pain and neck stiffness.   Skin:  Negative for color change, pallor, rash and wound.   Allergic/Immunologic: Negative for environmental allergies, food allergies and immunocompromised state.   Neurological:  Positive for dizziness and weakness. Negative for tremors, seizures, syncope, facial asymmetry, speech difficulty, light-headedness, numbness and headaches.   Hematological:  Negative for adenopathy. Does not bruise/bleed easily.   Psychiatric/Behavioral:  Negative for agitation, behavioral problems, confusion, decreased concentration, dysphoric mood, hallucinations, self-injury, sleep disturbance and suicidal ideas. The patient is not nervous/anxious and is not hyperactive.         Review of  Imaging: Carotid duplex dated 7/23/2025 from [facility] was reviewed along with corresponding radiologic report.  Comparison is made to multiple prior studies, the most recent being on 10/16/2024.  The right common carotid/internal carotid artery stent construct remains widely patent, without recurrent hemodynamically significant stenosis peak velocities within the right carotid vasculature are 229/80 cm/s.  The right common carotid artery was unable to be visualized due to patient sensitivity on the right side of his neck.  Peak velocities in the right common carotid artery were 239/57 cm/s in  .  Chronic occlusion of the left internal carotid artery is again noted.    Vital Signs:   Vitals:    25 1424   BP: 130/60   Pulse: 93   Temp: 98.2 °F (36.8 °C)   SpO2: 97%        Physical Exam  General: Well-developed, well-nourished, in no acute distress.    Cardiovascular: Right carotid bruit    Neuro: Alert and oriented x 3.  He is status post total laryngectomy, but communicates very well with the vocal assist device.  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  I do not appreciate any gross visual field deficits in the right eye.  Strength is symmetric in upper and lower extremities.  Ambulates unassisted.      Social History    Tobacco Use      Smoking status: Former        Packs/day: 0.00        Years: 2.0 packs/day for 30.0 years (60.0 ttl pk-yrs)        Types: Cigarettes        Start date:         Quit date:         Years since quittin.5        Passive exposure: Past      Smokeless tobacco: Never       Tobacco Use: Medium Risk (2025)    Patient History     Smoking Tobacco Use: Former     Smokeless Tobacco Use: Never     Passive Exposure: Past       STEADI Fall Risk Assessment was completed, and patient is at LOW risk for falls.Assessment completed on:2025        Assessment/Plan    Diagnoses and all orders for this visit:    1. Carotid stenosis, status post right carotid stent (2021) (Primary)  -     MRI Angiogram Neck With Contrast; Future  -     MRI Angiogram Head Without Contrast; Future         Patrick Shay is a 69 y.o. male status post angioplasty/stent placement for right carotid stenosis, with his most recent intervention being on 2022. He has chronic occlusion of the left internal carotid artery, and somewhat tenuous collateral flow to the left cerebral hemisphere. He does have limb shaking TIAs involving the right upper and lower extremities that are occurring much more frequently.  Carotid duplex on 2025 demonstrates a widely patent right carotid  stent without recurrent hemodynamically significant stenosis.  Mr. Shay and family are concerned about these episodes, therefore we will obtain an MR angiogram of the head and neck to further assess cerebral vasculature.  Mr. Shay will follow-up with Dr. Covarrubias following MR angiograms.  He remains on a chronic dual antiplatelet regimen and is tolerating this well and should continue this.  In the interim, he will contact our office and/or 911 if he develops any new neurologic deficit.    Patient encouraged to contact us if he has any changes in his condition or any concerns.    Any copied data from previous notes included in the (1) HPI, (2) PE, (3) MDM and/or Assessment and Plan has been reviewed and accurate as of 07/24/25.    I spent 33 minutes caring for Patrick on this date of service. This time includes time spent by me in the following activities: preparing for the visit, reviewing tests, performing a medically appropriate examination and/or evaluation, counseling and educating the patient/family/caregiver, referring and communicating with other health care professionals, documenting information in the medical record, independently interpreting results and communicating that information with the patient/family/caregiver, ordering test(s), and obtaining a separately obtained history.      Yecenia Li PA-C  07/24/25

## 2025-07-24 LAB
BH CV DISTAL RIGHT ICA HIDDEN LRR: 1 CM
BH CV MID RIGHT ICA HIDDEN LRR: 1 CM
BH CV VAS CAROTID RIGHT DISTAL STENT EDV: 78.5 CM/S
BH CV VAS CAROTID RIGHT DISTAL STENT PSV: 215.4 CM/S
BH CV VAS CAROTID RIGHT DISTAL TO STENT NATIVE VESSEL E: 101.2 CM/S
BH CV VAS CAROTID RIGHT DISTAL TO STENT PSV: 273.7 CM/S
BH CV VAS CAROTID RIGHT MID STENT EDV: 79.7 CM/S
BH CV VAS CAROTID RIGHT MID STENT PSV: 228.5 CM/S
BH CV VAS CAROTID RIGHT PROXIMAL STENT EDV: 61.9 CM/S
BH CV VAS CAROTID RIGHT PROXIMAL STENT PSV: 174.9 CM/S
BH CV VAS CAROTID RIGHT STENT NATIVE VESSEL PROXIMAL EDV: 64.3 CM/S
BH CV VAS CAROTID RIGHT STENT NATIVE VESSEL PROXIMAL PS: 173.8 CM/S
BH CV XLRA MEAS LEFT DIST CCA EDV: 20.7 CM/SEC
BH CV XLRA MEAS LEFT DIST CCA PSV: 56.4 CM/SEC
BH CV XLRA MEAS LEFT MID CCA EDV: 142.8 CM/SEC
BH CV XLRA MEAS LEFT MID CCA PSV: 514.1 CM/SEC
BH CV XLRA MEAS LEFT PROX CCA EDV: 76.9 CM/SEC
BH CV XLRA MEAS LEFT PROX CCA PSV: 257.1 CM/SEC
BH CV XLRA MEAS LEFT VERTEBRAL A EDV: 15.3 CM/SEC
BH CV XLRA MEAS LEFT VERTEBRAL A PSV: 46.5 CM/SEC
BH CV XLRA MEAS RIGHT DIST ICA EDV: 78.5 CM/SEC
BH CV XLRA MEAS RIGHT DIST ICA PSV: 215.4 CM/SEC
BH CV XLRA MEAS RIGHT MID ICA EDV: 79.7 CM/SEC
BH CV XLRA MEAS RIGHT MID ICA PSV: 228.5 CM/SEC
BH CV XLRA MEAS RIGHT PROX ICA EDV: 61.9 CM/SEC
BH CV XLRA MEAS RIGHT PROX ICA PSV: 174.9 CM/SEC
BH CV XLRA MEAS RIGHT PROX SCLA PSV: 396.9 CM/SEC
BH CV XLRA MEAS RIGHT VERTEBRAL A EDV: 29.5 CM/SEC
BH CV XLRA MEAS RIGHT VERTEBRAL A PSV: 80.9 CM/SEC
BH CVPROX RIGHT ICA HIDDEN LRR: 1 CM
LEFT ARM BP: ABNORMAL MMHG
RIGHT ARM BP: ABNORMAL MMHG

## 2025-08-08 ENCOUNTER — HOSPITAL ENCOUNTER (OUTPATIENT)
Dept: MRI IMAGING | Facility: HOSPITAL | Age: 70
Discharge: HOME | End: 2025-08-08
Payer: MEDICARE

## 2025-08-08 DIAGNOSIS — I65.21 CAROTID STENOSIS, ASYMPTOMATIC, RIGHT: ICD-10-CM

## 2025-08-08 PROCEDURE — 70548 MR ANGIOGRAPHY NECK W/DYE: CPT

## 2025-08-08 PROCEDURE — A9577 INJ MULTIHANCE: HCPCS

## 2025-08-08 PROCEDURE — 25510000002 GADOBENATE DIMEGLUMINE 529 MG/ML SOLUTION

## 2025-08-08 PROCEDURE — 70544 MR ANGIOGRAPHY HEAD W/O DYE: CPT

## 2025-08-08 RX ADMIN — GADOBENATE DIMEGLUMINE 20 ML: 529 INJECTION, SOLUTION INTRAVENOUS at 18:45

## 2025-08-13 ENCOUNTER — OFFICE VISIT (OUTPATIENT)
Dept: NEUROSURGERY | Facility: CLINIC | Age: 70
End: 2025-08-13
Payer: MEDICARE

## 2025-08-13 VITALS — HEIGHT: 69 IN | BODY MASS INDEX: 27.55 KG/M2 | TEMPERATURE: 98.2 F | WEIGHT: 186 LBS

## 2025-08-13 DIAGNOSIS — I65.23 CAROTID STENOSIS, SYMPTOMATIC W/O INFARCT, BILATERAL: Primary | ICD-10-CM

## 2025-08-13 PROCEDURE — 1160F RVW MEDS BY RX/DR IN RCRD: CPT | Performed by: RADIOLOGY

## 2025-08-13 PROCEDURE — 1159F MED LIST DOCD IN RCRD: CPT | Performed by: RADIOLOGY

## 2025-08-13 PROCEDURE — 99214 OFFICE O/P EST MOD 30 MIN: CPT | Performed by: RADIOLOGY

## 2025-08-15 DIAGNOSIS — I65.22 LEFT CAROTID STENOSIS: Primary | ICD-10-CM

## 2025-08-15 RX ORDER — SODIUM CHLORIDE 0.9 % (FLUSH) 0.9 %
10 SYRINGE (ML) INJECTION EVERY 12 HOURS SCHEDULED
OUTPATIENT
Start: 2025-08-15

## 2025-08-15 RX ORDER — SODIUM CHLORIDE 0.9 % (FLUSH) 0.9 %
1-10 SYRINGE (ML) INJECTION AS NEEDED
OUTPATIENT
Start: 2025-08-15

## 2025-08-15 RX ORDER — SODIUM CHLORIDE 9 MG/ML
40 INJECTION, SOLUTION INTRAVENOUS AS NEEDED
OUTPATIENT
Start: 2025-08-15

## (undated) DEVICE — ST ACC MICROPUNCTURE .018 TRANSLSS/SS/TP 5F/10CM 21G/7CM

## (undated) DEVICE — STPCK 3/WY HP M/RA W/OFF/HNDL 1050PSI STRL

## (undated) DEVICE — R2P DESTINATION SLENDER GUIDING SHEATH: Brand: R2P DESTINATION SLENDER

## (undated) DEVICE — ROTATING HEMOSTATIC VALVE .096": Brand: RHV

## (undated) DEVICE — VIATRAC 14 PLUS PERIPHERAL DILATATION CATHETER 7.0 MM X 30 MM X 135 CM: Brand: VIATRAC

## (undated) DEVICE — DEV COMP RAD PRELUDESYNC 24CM

## (undated) DEVICE — CATH ANGIO SIM2 HNB5.0 .038 100 P NS

## (undated) DEVICE — CATH TEMPO 5F BER 100CM: Brand: TEMPO

## (undated) DEVICE — GLIDESHEATH SLENDER STAINLESS STEEL KIT: Brand: GLIDESHEATH SLENDER

## (undated) DEVICE — RADIFOCUS GLIDEWIRE: Brand: GLIDEWIRE

## (undated) DEVICE — ANGIO-SEAL VIP VASCULAR CLOSURE DEVICE: Brand: ANGIO-SEAL

## (undated) DEVICE — LEX NEURO ANGIOGRAPHY: Brand: MEDLINE INDUSTRIES, INC.

## (undated) DEVICE — EMBOSHIELD NAV6 EMBOLIC PROTECTION SYSTEM 7.2 MM X 190 CM: Brand: EMBOSHIELD  NAV6

## (undated) DEVICE — LIMB HOLDER, WRIST/ANKLE: Brand: DEROYAL

## (undated) DEVICE — Device

## (undated) DEVICE — ST EXT IV SMARTSITE 2VLV SP M LL 5ML IV1

## (undated) DEVICE — SKIN PREP TRAY W/CHG: Brand: MEDLINE INDUSTRIES, INC.

## (undated) DEVICE — VIATRAC 14 PLUS PERIPHERAL DILATATION CATHETER 6.0 MM X 30 MM X 135 CM: Brand: VIATRAC

## (undated) DEVICE — CVR TRANSD FLX 3DIMEN 14X29.2CM LF STRL

## (undated) DEVICE — RADIFOCUS TORQUE DEVICE MULTI-TORQUE VISE: Brand: RADIFOCUS TORQUE DEVICE

## (undated) DEVICE — INTRO SHEATH ART/FEM ENGAGE .038 5F12CM

## (undated) DEVICE — INTRO SHEATH ENGAGE W/50 GW .038 8F12

## (undated) DEVICE — STPCK LP 1WY RA 200PSI

## (undated) DEVICE — INTRO SHEATH FLX 7F80CM

## (undated) DEVICE — INTRO CHECKFLOW W/RAAB .038 8F70CM

## (undated) DEVICE — DEV INFL MONARCH 25W